# Patient Record
Sex: FEMALE | Race: WHITE | NOT HISPANIC OR LATINO | Employment: FULL TIME | URBAN - METROPOLITAN AREA
[De-identification: names, ages, dates, MRNs, and addresses within clinical notes are randomized per-mention and may not be internally consistent; named-entity substitution may affect disease eponyms.]

---

## 2023-02-22 PROBLEM — M19.90 ARTHRITIS, DEGENERATIVE: Status: ACTIVE | Noted: 2023-02-22

## 2023-02-22 PROBLEM — R22.9 SUBCUTANEOUS MASS: Status: ACTIVE | Noted: 2023-02-22

## 2023-02-22 PROBLEM — K62.5 RECTAL BLEED: Status: ACTIVE | Noted: 2023-02-22

## 2023-02-22 PROBLEM — I25.10 CAD S/P PERCUTANEOUS CORONARY ANGIOPLASTY: Status: ACTIVE | Noted: 2023-02-22

## 2023-02-22 PROBLEM — J06.9 ACUTE URI: Status: ACTIVE | Noted: 2023-02-22

## 2023-02-22 PROBLEM — M25.552 HIP PAIN, LEFT: Status: ACTIVE | Noted: 2023-02-22

## 2023-02-22 PROBLEM — H01.009 BLEPHARITIS: Status: ACTIVE | Noted: 2023-02-22

## 2023-02-22 PROBLEM — M19.171 POST-TRAUMATIC OSTEOARTHRITIS OF BOTH ANKLES: Status: ACTIVE | Noted: 2023-02-22

## 2023-02-22 PROBLEM — R55 NEAR SYNCOPE: Status: ACTIVE | Noted: 2023-02-22

## 2023-02-22 PROBLEM — Z98.61 CAD S/P PERCUTANEOUS CORONARY ANGIOPLASTY: Status: ACTIVE | Noted: 2023-02-22

## 2023-02-22 PROBLEM — J18.9 PNEUMONIA: Status: ACTIVE | Noted: 2023-02-22

## 2023-02-22 PROBLEM — N39.41 URGE INCONTINENCE OF URINE: Status: ACTIVE | Noted: 2023-02-22

## 2023-02-22 PROBLEM — M79.609 PAIN DUE TO ONYCHOMYCOSIS OF NAIL: Status: ACTIVE | Noted: 2023-02-22

## 2023-02-22 PROBLEM — R07.89 ATYPICAL CHEST PAIN: Status: ACTIVE | Noted: 2023-02-22

## 2023-02-22 PROBLEM — H66.90 ACUTE OTITIS MEDIA: Status: ACTIVE | Noted: 2023-02-22

## 2023-02-22 PROBLEM — R09.89 BRUIT OF LEFT CAROTID ARTERY: Status: ACTIVE | Noted: 2023-02-22

## 2023-02-22 PROBLEM — K62.5 BRIGHT RED RECTAL BLEEDING: Status: ACTIVE | Noted: 2023-02-22

## 2023-02-22 PROBLEM — I21.3 STEMI (ST ELEVATION MYOCARDIAL INFARCTION) (MULTI): Status: ACTIVE | Noted: 2023-02-22

## 2023-02-22 PROBLEM — R35.1 NOCTURIA: Status: ACTIVE | Noted: 2023-02-22

## 2023-02-22 PROBLEM — I65.21 STENOSIS OF RIGHT CAROTID ARTERY: Status: ACTIVE | Noted: 2023-02-22

## 2023-02-22 PROBLEM — H60.509 ACUTE OTITIS EXTERNA: Status: ACTIVE | Noted: 2023-02-22

## 2023-02-22 PROBLEM — D07.1 VULVAR INTRAEPITHELIAL NEOPLASIA (VIN) GRADE 3: Status: ACTIVE | Noted: 2023-02-22

## 2023-02-22 PROBLEM — R55 SYNCOPE: Status: ACTIVE | Noted: 2023-02-22

## 2023-02-22 PROBLEM — K13.0 CELLULITIS, LIP: Status: ACTIVE | Noted: 2023-02-22

## 2023-02-22 PROBLEM — N39.44 PRIMARY NOCTURNAL ENURESIS: Status: ACTIVE | Noted: 2023-02-22

## 2023-02-22 PROBLEM — R94.31 ABNORMAL EKG: Status: ACTIVE | Noted: 2023-02-22

## 2023-02-22 PROBLEM — R09.89 BILATERAL CAROTID BRUITS: Status: ACTIVE | Noted: 2023-02-22

## 2023-02-22 PROBLEM — C34.90 ADENOCARCINOMA OF LUNG (MULTI): Status: ACTIVE | Noted: 2023-02-22

## 2023-02-22 PROBLEM — R39.15 URINARY URGENCY: Status: ACTIVE | Noted: 2023-02-22

## 2023-02-22 PROBLEM — R51.9 HEADACHE: Status: ACTIVE | Noted: 2023-02-22

## 2023-02-22 PROBLEM — S69.91XA HAND INJURY, RIGHT, INITIAL ENCOUNTER: Status: ACTIVE | Noted: 2023-02-22

## 2023-02-22 PROBLEM — H00.019 STYE: Status: ACTIVE | Noted: 2023-02-22

## 2023-02-22 PROBLEM — N39.0 UTI (URINARY TRACT INFECTION): Status: ACTIVE | Noted: 2023-02-22

## 2023-02-22 PROBLEM — R42 DIZZINESS: Status: ACTIVE | Noted: 2023-02-22

## 2023-02-22 PROBLEM — L50.9 HIVES: Status: ACTIVE | Noted: 2023-02-22

## 2023-02-22 PROBLEM — M19.172 POST-TRAUMATIC OSTEOARTHRITIS OF BOTH ANKLES: Status: ACTIVE | Noted: 2023-02-22

## 2023-02-22 PROBLEM — J20.9 ACUTE BRONCHITIS: Status: ACTIVE | Noted: 2023-02-22

## 2023-02-22 PROBLEM — H60.11 CELLULITIS OF RIGHT EAR: Status: ACTIVE | Noted: 2023-02-22

## 2023-02-22 PROBLEM — D23.9 MULTIPLE DYSPLASTIC NEVI: Status: ACTIVE | Noted: 2023-02-22

## 2023-02-22 PROBLEM — G56.30 RADIAL NERVE COMPRESSION: Status: ACTIVE | Noted: 2023-02-22

## 2023-02-22 PROBLEM — L98.9 SKIN LESION OF FACE: Status: ACTIVE | Noted: 2023-02-22

## 2023-02-22 PROBLEM — K64.4 EXTERNAL HEMORRHOIDS: Status: ACTIVE | Noted: 2023-02-22

## 2023-02-22 PROBLEM — R00.2 PALPITATIONS: Status: ACTIVE | Noted: 2023-02-22

## 2023-02-22 PROBLEM — K52.9 COLITIS: Status: ACTIVE | Noted: 2023-02-22

## 2023-02-22 PROBLEM — M25.551 HIP PAIN, RIGHT: Status: ACTIVE | Noted: 2023-02-22

## 2023-02-22 PROBLEM — R32 URINARY INCONTINENCE: Status: ACTIVE | Noted: 2023-02-22

## 2023-02-22 PROBLEM — K52.1 ANTIBIOTIC-ASSOCIATED DIARRHEA: Status: ACTIVE | Noted: 2023-02-22

## 2023-02-22 PROBLEM — N32.81 OVERACTIVE BLADDER: Status: ACTIVE | Noted: 2023-02-22

## 2023-02-22 PROBLEM — N95.1 MENOPAUSAL STATE: Status: ACTIVE | Noted: 2023-02-22

## 2023-02-22 PROBLEM — I10 ESSENTIAL HYPERTENSION: Status: ACTIVE | Noted: 2023-02-22

## 2023-02-22 PROBLEM — R07.9 CHEST PAIN: Status: ACTIVE | Noted: 2023-02-22

## 2023-02-22 PROBLEM — G89.29 CHRONIC HIP PAIN: Status: ACTIVE | Noted: 2023-02-22

## 2023-02-22 PROBLEM — L03.112 CELLULITIS OF LEFT AXILLA: Status: ACTIVE | Noted: 2023-02-22

## 2023-02-22 PROBLEM — M54.16 LEFT LUMBAR RADICULITIS: Status: ACTIVE | Noted: 2023-02-22

## 2023-02-22 PROBLEM — N20.0 NEPHROLITHIASIS: Status: ACTIVE | Noted: 2023-02-22

## 2023-02-22 PROBLEM — C44.92 SQUAMOUS CELL SKIN CANCER: Status: ACTIVE | Noted: 2023-02-22

## 2023-02-22 PROBLEM — T36.95XA ANTIBIOTIC-ASSOCIATED DIARRHEA: Status: ACTIVE | Noted: 2023-02-22

## 2023-02-22 PROBLEM — F17.200 NICOTINE USE DISORDER: Status: ACTIVE | Noted: 2023-02-22

## 2023-02-22 PROBLEM — K21.9 GERD (GASTROESOPHAGEAL REFLUX DISEASE): Status: ACTIVE | Noted: 2023-02-22

## 2023-02-22 PROBLEM — L03.211 CELLULITIS OF FACE: Status: ACTIVE | Noted: 2023-02-22

## 2023-02-22 PROBLEM — L98.9 SKIN LESION OF LOWER EXTREMITY: Status: ACTIVE | Noted: 2023-02-22

## 2023-02-22 PROBLEM — R51.9 CEPHALGIA: Status: ACTIVE | Noted: 2023-02-22

## 2023-02-22 PROBLEM — B35.1 PAIN DUE TO ONYCHOMYCOSIS OF NAIL: Status: ACTIVE | Noted: 2023-02-22

## 2023-02-22 PROBLEM — F32.0 DEPRESSION, MAJOR, SINGLE EPISODE, MILD (CMS-HCC): Status: ACTIVE | Noted: 2023-02-22

## 2023-02-22 PROBLEM — J01.90 ACUTE SINUSITIS: Status: ACTIVE | Noted: 2023-02-22

## 2023-02-22 PROBLEM — S63.91XA SPRAIN OF RIGHT HAND: Status: ACTIVE | Noted: 2023-02-22

## 2023-02-22 PROBLEM — J02.9 PHARYNGITIS, ACUTE: Status: ACTIVE | Noted: 2023-02-22

## 2023-02-22 PROBLEM — I10 HYPERTENSION: Status: ACTIVE | Noted: 2023-02-22

## 2023-02-22 PROBLEM — M25.559 CHRONIC HIP PAIN: Status: ACTIVE | Noted: 2023-02-22

## 2023-02-22 PROBLEM — F41.9 ANXIETY: Status: ACTIVE | Noted: 2023-02-22

## 2023-02-22 PROBLEM — H60.91 RIGHT OTITIS EXTERNA: Status: ACTIVE | Noted: 2023-02-22

## 2023-02-22 PROBLEM — L98.9 BACK SKIN LESION: Status: ACTIVE | Noted: 2023-02-22

## 2023-02-22 PROBLEM — L72.9 BENIGN SKIN CYST: Status: ACTIVE | Noted: 2023-02-22

## 2023-02-22 PROBLEM — L03.111 CELLULITIS OF RIGHT AXILLA: Status: ACTIVE | Noted: 2023-02-22

## 2023-02-22 PROBLEM — I65.29 CAROTID ARTERY STENOSIS: Status: ACTIVE | Noted: 2023-02-22

## 2023-02-22 PROBLEM — M19.90 OSTEOARTHRITIS: Status: ACTIVE | Noted: 2023-02-22

## 2023-02-22 PROBLEM — R10.9 ABDOMINAL PAIN: Status: ACTIVE | Noted: 2023-02-22

## 2023-02-22 PROBLEM — H61.23 EXCESSIVE EAR WAX, BILATERAL: Status: ACTIVE | Noted: 2023-02-22

## 2023-02-22 PROBLEM — E78.5 HYPERLIPIDEMIA: Status: ACTIVE | Noted: 2023-02-22

## 2023-02-22 RX ORDER — CITALOPRAM 40 MG/1
1 TABLET, FILM COATED ORAL DAILY
COMMUNITY
Start: 2020-08-25 | End: 2023-03-21 | Stop reason: SDUPTHER

## 2023-02-22 RX ORDER — ATORVASTATIN CALCIUM 80 MG/1
1 TABLET, FILM COATED ORAL DAILY
COMMUNITY
Start: 2016-04-29 | End: 2023-12-18 | Stop reason: SDUPTHER

## 2023-02-22 RX ORDER — ALPRAZOLAM 0.5 MG/1
1 TABLET ORAL 3 TIMES DAILY PRN
COMMUNITY
End: 2023-03-21 | Stop reason: SDUPTHER

## 2023-02-22 RX ORDER — CALCIUM CARBONATE 300MG(750)
1 TABLET,CHEWABLE ORAL DAILY
COMMUNITY
End: 2023-03-21 | Stop reason: SDUPTHER

## 2023-02-22 RX ORDER — OMEPRAZOLE 40 MG/1
1 CAPSULE, DELAYED RELEASE ORAL DAILY
COMMUNITY
Start: 2022-04-04 | End: 2023-03-21 | Stop reason: SDUPTHER

## 2023-02-22 RX ORDER — IBUPROFEN 200 MG
1 TABLET ORAL DAILY
COMMUNITY
End: 2023-07-06 | Stop reason: SDUPTHER

## 2023-02-22 RX ORDER — VENLAFAXINE HYDROCHLORIDE 37.5 MG/1
1 CAPSULE, EXTENDED RELEASE ORAL
COMMUNITY
Start: 2022-06-30 | End: 2023-03-21 | Stop reason: SDUPTHER

## 2023-02-22 RX ORDER — CARVEDILOL 25 MG/1
1 TABLET ORAL
COMMUNITY
Start: 2021-12-05 | End: 2023-03-21 | Stop reason: SDUPTHER

## 2023-02-22 RX ORDER — SOLIFENACIN SUCCINATE 10 MG/1
1 TABLET, FILM COATED ORAL DAILY
COMMUNITY
Start: 2022-08-26 | End: 2023-03-21 | Stop reason: SDUPTHER

## 2023-02-22 RX ORDER — NITROGLYCERIN 0.4 MG/1
1 TABLET SUBLINGUAL EVERY 5 MIN PRN
COMMUNITY
End: 2023-07-06 | Stop reason: SDUPTHER

## 2023-02-22 RX ORDER — CELECOXIB 200 MG/1
1 CAPSULE ORAL DAILY PRN
COMMUNITY
Start: 2022-03-25 | End: 2023-03-07 | Stop reason: SDUPTHER

## 2023-02-22 RX ORDER — LISINOPRIL 20 MG/1
1 TABLET ORAL DAILY
COMMUNITY
End: 2023-03-21 | Stop reason: SDUPTHER

## 2023-02-22 RX ORDER — ASPIRIN 81 MG/1
1 TABLET ORAL DAILY
COMMUNITY

## 2023-03-06 ENCOUNTER — TELEPHONE (OUTPATIENT)
Dept: PRIMARY CARE | Facility: CLINIC | Age: 73
End: 2023-03-06
Payer: MEDICARE

## 2023-03-07 DIAGNOSIS — M19.91 PRIMARY OSTEOARTHRITIS, UNSPECIFIED SITE: Primary | ICD-10-CM

## 2023-03-07 RX ORDER — CELECOXIB 200 MG/1
200 CAPSULE ORAL DAILY PRN
Qty: 90 CAPSULE | Refills: 0 | Status: SHIPPED | OUTPATIENT
Start: 2023-03-07 | End: 2023-03-21 | Stop reason: SDUPTHER

## 2023-03-07 NOTE — TELEPHONE ENCOUNTER
Rx Refill Request Telephone Encounter    Celobrex 200mg take one tab prn  Pharm: [facility] # [phone]  LR:01/05/23  LV:02/21/23  NV:03/13/23

## 2023-03-09 ENCOUNTER — TELEPHONE (OUTPATIENT)
Dept: PRIMARY CARE | Facility: CLINIC | Age: 73
End: 2023-03-09
Payer: MEDICARE

## 2023-03-09 NOTE — TELEPHONE ENCOUNTER
----- Message from Abram Salmeron DO sent at 3/8/2023  8:45 PM EST -----  Please let her know her CT of the chest showed no changes compared to her previous study in October 2022.  There was a mildly enlarged lymph node in the center of the lungs measuring 1.4 cm.  There was some scarring in the upper lungs.  Nothing new since October and no concerning findings.

## 2023-03-21 ENCOUNTER — OFFICE VISIT (OUTPATIENT)
Dept: PRIMARY CARE | Facility: CLINIC | Age: 73
End: 2023-03-21
Payer: MEDICARE

## 2023-03-21 VITALS
SYSTOLIC BLOOD PRESSURE: 110 MMHG | DIASTOLIC BLOOD PRESSURE: 62 MMHG | WEIGHT: 139 LBS | TEMPERATURE: 95.6 F | BODY MASS INDEX: 23.73 KG/M2 | HEIGHT: 64 IN

## 2023-03-21 DIAGNOSIS — K21.9 GASTROESOPHAGEAL REFLUX DISEASE, UNSPECIFIED WHETHER ESOPHAGITIS PRESENT: ICD-10-CM

## 2023-03-21 DIAGNOSIS — M19.91 PRIMARY OSTEOARTHRITIS, UNSPECIFIED SITE: ICD-10-CM

## 2023-03-21 DIAGNOSIS — C34.90 ADENOCARCINOMA OF LUNG, UNSPECIFIED LATERALITY (MULTI): Primary | ICD-10-CM

## 2023-03-21 DIAGNOSIS — R32 URINARY INCONTINENCE, UNSPECIFIED TYPE: ICD-10-CM

## 2023-03-21 DIAGNOSIS — I21.3 ST ELEVATION MYOCARDIAL INFARCTION (STEMI), UNSPECIFIED ARTERY (MULTI): ICD-10-CM

## 2023-03-21 DIAGNOSIS — F17.200 NICOTINE USE DISORDER: ICD-10-CM

## 2023-03-21 DIAGNOSIS — M70.62 TROCHANTERIC BURSITIS OF LEFT HIP: ICD-10-CM

## 2023-03-21 DIAGNOSIS — D07.1 VULVAR INTRAEPITHELIAL NEOPLASIA (VIN) GRADE 3: ICD-10-CM

## 2023-03-21 DIAGNOSIS — Z98.61 CAD S/P PERCUTANEOUS CORONARY ANGIOPLASTY: ICD-10-CM

## 2023-03-21 DIAGNOSIS — I25.10 CAD S/P PERCUTANEOUS CORONARY ANGIOPLASTY: ICD-10-CM

## 2023-03-21 DIAGNOSIS — F32.0 DEPRESSION, MAJOR, SINGLE EPISODE, MILD (CMS-HCC): ICD-10-CM

## 2023-03-21 PROCEDURE — 1160F RVW MEDS BY RX/DR IN RCRD: CPT | Performed by: FAMILY MEDICINE

## 2023-03-21 PROCEDURE — 3078F DIAST BP <80 MM HG: CPT | Performed by: FAMILY MEDICINE

## 2023-03-21 PROCEDURE — 1159F MED LIST DOCD IN RCRD: CPT | Performed by: FAMILY MEDICINE

## 2023-03-21 PROCEDURE — 1036F TOBACCO NON-USER: CPT | Performed by: FAMILY MEDICINE

## 2023-03-21 PROCEDURE — 99214 OFFICE O/P EST MOD 30 MIN: CPT | Performed by: FAMILY MEDICINE

## 2023-03-21 PROCEDURE — 3074F SYST BP LT 130 MM HG: CPT | Performed by: FAMILY MEDICINE

## 2023-03-21 RX ORDER — OMEPRAZOLE 40 MG/1
40 CAPSULE, DELAYED RELEASE ORAL
Qty: 90 CAPSULE | Refills: 1 | Status: SHIPPED | OUTPATIENT
Start: 2023-03-21 | End: 2024-02-01 | Stop reason: WASHOUT

## 2023-03-21 RX ORDER — ALBUTEROL SULFATE 90 UG/1
AEROSOL, METERED RESPIRATORY (INHALATION)
COMMUNITY
Start: 2023-03-15 | End: 2024-02-01 | Stop reason: SDUPTHER

## 2023-03-21 RX ORDER — CITALOPRAM 40 MG/1
40 TABLET, FILM COATED ORAL DAILY
Qty: 90 TABLET | Refills: 1 | Status: SHIPPED | OUTPATIENT
Start: 2023-03-21 | End: 2023-07-06 | Stop reason: SDUPTHER

## 2023-03-21 RX ORDER — PREDNISONE 20 MG/1
TABLET ORAL
Qty: 21 TABLET | Refills: 0 | Status: SHIPPED | OUTPATIENT
Start: 2023-03-21 | End: 2023-05-22 | Stop reason: ALTCHOICE

## 2023-03-21 RX ORDER — LISINOPRIL 20 MG/1
20 TABLET ORAL DAILY
Qty: 30 TABLET | Refills: 1 | Status: SHIPPED | OUTPATIENT
Start: 2023-03-21

## 2023-03-21 RX ORDER — SOLIFENACIN SUCCINATE 10 MG/1
10 TABLET, FILM COATED ORAL DAILY
Qty: 90 TABLET | Refills: 1 | Status: SHIPPED | OUTPATIENT
Start: 2023-03-21 | End: 2024-02-01 | Stop reason: WASHOUT

## 2023-03-21 RX ORDER — CALCIUM CARBONATE 300MG(750)
1 TABLET,CHEWABLE ORAL DAILY
Qty: 90 TABLET | Refills: 1 | Status: SHIPPED | OUTPATIENT
Start: 2023-03-21 | End: 2024-01-04 | Stop reason: SDUPTHER

## 2023-03-21 RX ORDER — CARVEDILOL 25 MG/1
25 TABLET ORAL
Qty: 180 TABLET | Refills: 1 | Status: SHIPPED | OUTPATIENT
Start: 2023-03-21 | End: 2023-07-06 | Stop reason: SDUPTHER

## 2023-03-21 RX ORDER — VENLAFAXINE HYDROCHLORIDE 37.5 MG/1
37.5 CAPSULE, EXTENDED RELEASE ORAL
Qty: 90 CAPSULE | Refills: 1 | Status: SHIPPED | OUTPATIENT
Start: 2023-03-21 | End: 2023-07-06 | Stop reason: SDUPTHER

## 2023-03-21 RX ORDER — CHOLECALCIFEROL (VITAMIN D3) 50 MCG
TABLET ORAL
COMMUNITY

## 2023-03-21 RX ORDER — CELECOXIB 200 MG/1
200 CAPSULE ORAL DAILY PRN
Qty: 90 CAPSULE | Refills: 1 | Status: SHIPPED | OUTPATIENT
Start: 2023-03-21 | End: 2023-07-06 | Stop reason: SDUPTHER

## 2023-03-21 RX ORDER — ESTRADIOL 0.1 MG/G
CREAM VAGINAL
COMMUNITY
Start: 2023-02-22 | End: 2024-01-25 | Stop reason: ALTCHOICE

## 2023-03-21 RX ORDER — ALPRAZOLAM 0.5 MG/1
0.5 TABLET ORAL 3 TIMES DAILY PRN
Qty: 90 TABLET | Refills: 2 | Status: SHIPPED | OUTPATIENT
Start: 2023-03-21 | End: 2023-07-06 | Stop reason: SDUPTHER

## 2023-03-21 RX ORDER — UMECLIDINIUM BROMIDE AND VILANTEROL TRIFENATATE 62.5; 25 UG/1; UG/1
POWDER RESPIRATORY (INHALATION)
COMMUNITY
Start: 2023-03-15 | End: 2024-02-01 | Stop reason: SDUPTHER

## 2023-03-21 ASSESSMENT — PATIENT HEALTH QUESTIONNAIRE - PHQ9
10. IF YOU CHECKED OFF ANY PROBLEMS, HOW DIFFICULT HAVE THESE PROBLEMS MADE IT FOR YOU TO DO YOUR WORK, TAKE CARE OF THINGS AT HOME, OR GET ALONG WITH OTHER PEOPLE: SOMEWHAT DIFFICULT
SUM OF ALL RESPONSES TO PHQ9 QUESTIONS 1 AND 2: 2
2. FEELING DOWN, DEPRESSED OR HOPELESS: SEVERAL DAYS
1. LITTLE INTEREST OR PLEASURE IN DOING THINGS: SEVERAL DAYS

## 2023-03-21 ASSESSMENT — ENCOUNTER SYMPTOMS: DEPRESSION: 0

## 2023-03-21 NOTE — PROGRESS NOTES
"Subjective   Patient ID: [id number]     Edu Domingo is a 72 y.o. female who presents for Follow-up and Med Refill.  HPI  She is here for a recheck.      She saw gyn onc and the report from her 2/21/23 visit was reviewed today.  Hx DAMIEN.  Exam was normal that day following hematuria thought to be from a UTI that resolved with antibiotics.  No further blood in the urine.    She is following cardiology and EPS for v tach.      At my last visit, I ordered  a CT of the chest due to her chronic cough and hx of lung cancer.  It showed only a stable precarinal lymph node and no sign of recurrence of the lung cancer.      She saw Dr Barnes.  She was told she had 64% of her lung function.    She has recently been given nicotine replacement treatment for smoking cessation.  So far, she is still not smoking and still on the nicotine patch.  She has plenty for now.    She denies any chest pain or heart palpitations recently.  Objective     /62 (BP Location: Left arm, Patient Position: Sitting)   Temp 35.3 °C (95.6 °F) (Skin)   Ht 1.626 m (5' 4\")   Wt 63 kg (139 lb)   BMI 23.86 kg/m²      Physical Exam  Constitutional:       Appearance: Normal appearance.   Cardiovascular:      Rate and Rhythm: Normal rate and regular rhythm.      Pulses: Normal pulses.      Heart sounds: Normal heart sounds.   Pulmonary:      Effort: Pulmonary effort is normal.      Breath sounds: Normal breath sounds. No wheezing, rhonchi or rales.   Neurological:      General: No focal deficit present.      Mental Status: She is alert and oriented to person, place, and time.   Psychiatric:         Mood and Affect: Mood normal.      Comments: Anxiety and depression are well controlled on the medications         Assessment/Plan   Problem List Items Addressed This Visit          Respiratory    Adenocarcinoma of lung (CMS/HCC) - Primary       Circulatory    CAD S/P percutaneous coronary angioplasty    Relevant Medications    carvedilol (Coreg) 25 mg " tablet    lisinopril 20 mg tablet    magnesium oxide (Mag-Ox) 400 mg tablet    STEMI (ST elevation myocardial infarction) (CMS/HCC)    Relevant Medications    carvedilol (Coreg) 25 mg tablet       Digestive    GERD (gastroesophageal reflux disease)    Relevant Medications    omeprazole (PriLOSEC) 40 mg DR capsule       Genitourinary    Urinary incontinence    Relevant Medications    solifenacin (VESIcare) 10 mg tablet    Vulvar intraepithelial neoplasia (DAMIEN) grade 3       Musculoskeletal    Osteoarthritis    Relevant Medications    celecoxib (CeleBREX) 200 mg capsule       Other    Depression, major, single episode, mild (CMS/HCC)    Relevant Medications    ALPRAZolam (Xanax) 0.5 mg tablet    citalopram (CeleXA) 40 mg tablet    venlafaxine XR (Effexor-XR) 37.5 mg 24 hr capsule    Nicotine use disorder     Other Visit Diagnoses       Trochanteric bursitis of left hip        Relevant Medications    predniSONE (Deltasone) 20 mg tablet          I refilled your medication.  Follow up with cardiology and pulmonology.  Return in three months.  It is excellent that you are still off cigarettes.  Call if you need more of the nicotine patches.    Abram Salmeron, DO

## 2023-03-21 NOTE — PATIENT INSTRUCTIONS
I refilled your medication.  Follow up with cardiology and pulmonology.  Return in three months.  It is excellent that you are still off cigarettes.  Call if you need more of the nicotine patches.

## 2023-05-12 ENCOUNTER — TELEPHONE (OUTPATIENT)
Dept: PRIMARY CARE | Facility: CLINIC | Age: 73
End: 2023-05-12
Payer: MEDICARE

## 2023-05-12 DIAGNOSIS — N39.0 URINARY TRACT INFECTION WITHOUT HEMATURIA, SITE UNSPECIFIED: Primary | ICD-10-CM

## 2023-05-12 RX ORDER — NITROFURANTOIN 25; 75 MG/1; MG/1
100 CAPSULE ORAL 2 TIMES DAILY
Qty: 14 CAPSULE | Refills: 0 | Status: SHIPPED | OUTPATIENT
Start: 2023-05-12 | End: 2023-05-19

## 2023-05-12 NOTE — TELEPHONE ENCOUNTER
Pt LM on VM was treated in March for a UTI.  It went away and now its back and asking if she can have a antibiotic.  She has Burning and abd pain

## 2023-05-22 ENCOUNTER — OFFICE VISIT (OUTPATIENT)
Dept: PRIMARY CARE | Facility: CLINIC | Age: 73
End: 2023-05-22
Payer: MEDICARE

## 2023-05-22 VITALS
DIASTOLIC BLOOD PRESSURE: 70 MMHG | WEIGHT: 138 LBS | SYSTOLIC BLOOD PRESSURE: 162 MMHG | TEMPERATURE: 97.9 F | BODY MASS INDEX: 22.96 KG/M2

## 2023-05-22 DIAGNOSIS — M79.10 MYALGIA: ICD-10-CM

## 2023-05-22 DIAGNOSIS — I47.29 NON-SUSTAINED VENTRICULAR TACHYCARDIA (MULTI): Primary | ICD-10-CM

## 2023-05-22 DIAGNOSIS — M25.50 ARTHRALGIA, UNSPECIFIED JOINT: ICD-10-CM

## 2023-05-22 LAB
ALANINE AMINOTRANSFERASE (SGPT) (U/L) IN SER/PLAS: 137 U/L (ref 7–45)
ALBUMIN (G/DL) IN SER/PLAS: 3.4 G/DL (ref 3.4–5)
ALKALINE PHOSPHATASE (U/L) IN SER/PLAS: 488 U/L (ref 33–136)
ANION GAP IN SER/PLAS: 15 MMOL/L (ref 10–20)
ASPARTATE AMINOTRANSFERASE (SGOT) (U/L) IN SER/PLAS: 55 U/L (ref 9–39)
BASOPHILS (10*3/UL) IN BLOOD BY AUTOMATED COUNT: 0.09 X10E9/L (ref 0–0.1)
BASOPHILS/100 LEUKOCYTES IN BLOOD BY AUTOMATED COUNT: 0.2 % (ref 0–2)
BILIRUBIN TOTAL (MG/DL) IN SER/PLAS: 1.8 MG/DL (ref 0–1.2)
CALCIUM (MG/DL) IN SER/PLAS: 9.6 MG/DL (ref 8.6–10.3)
CARBON DIOXIDE, TOTAL (MMOL/L) IN SER/PLAS: 28 MMOL/L (ref 21–32)
CHLORIDE (MMOL/L) IN SER/PLAS: 94 MMOL/L (ref 98–107)
CREATINE KINASE (U/L) IN SER/PLAS: 16 U/L (ref 0–215)
CREATININE (MG/DL) IN SER/PLAS: 1.21 MG/DL (ref 0.5–1.05)
EOSINOPHILS (10*3/UL) IN BLOOD BY AUTOMATED COUNT: 0.01 X10E9/L (ref 0–0.7)
ERYTHROCYTE DISTRIBUTION WIDTH (RATIO) BY AUTOMATED COUNT: 15.6 % (ref 11.5–14.5)
ERYTHROCYTE MEAN CORPUSCULAR HEMOGLOBIN CONCENTRATION (G/DL) BY AUTOMATED: 32.9 G/DL (ref 32–36)
ERYTHROCYTE MEAN CORPUSCULAR VOLUME (FL) BY AUTOMATED COUNT: 82 FL (ref 80–100)
ERYTHROCYTES (10*6/UL) IN BLOOD BY AUTOMATED COUNT: 4.2 X10E12/L (ref 4–5.2)
GFR FEMALE: 48 ML/MIN/1.73M2
GLUCOSE (MG/DL) IN SER/PLAS: 100 MG/DL (ref 74–99)
HEMATOCRIT (%) IN BLOOD BY AUTOMATED COUNT: 34.6 % (ref 36–46)
HEMOGLOBIN (G/DL) IN BLOOD: 11.4 G/DL (ref 12–16)
IMMATURE GRANULOCYTES/100 LEUKOCYTES IN BLOOD BY AUTOMATED COUNT: 0.7 % (ref 0–0.9)
LEUKOCYTES (10*3/UL) IN BLOOD BY AUTOMATED COUNT: 36.4 X10E9/L (ref 4.4–11.3)
LYMPHOCYTES (10*3/UL) IN BLOOD BY AUTOMATED COUNT: 1.21 X10E9/L (ref 1.2–4.8)
LYMPHOCYTES/100 LEUKOCYTES IN BLOOD BY AUTOMATED COUNT: 3.3 % (ref 13–44)
MONOCYTES (10*3/UL) IN BLOOD BY AUTOMATED COUNT: 1.24 X10E9/L (ref 0.1–1)
MONOCYTES/100 LEUKOCYTES IN BLOOD BY AUTOMATED COUNT: 3.4 % (ref 2–10)
NEUTROPHILS (10*3/UL) IN BLOOD BY AUTOMATED COUNT: 33.6 X10E9/L (ref 1.2–7.7)
NEUTROPHILS/100 LEUKOCYTES IN BLOOD BY AUTOMATED COUNT: 92.4 % (ref 40–80)
PLATELETS (10*3/UL) IN BLOOD AUTOMATED COUNT: 198 X10E9/L (ref 150–450)
POTASSIUM (MMOL/L) IN SER/PLAS: 4.5 MMOL/L (ref 3.5–5.3)
PROTEIN TOTAL: 5.9 G/DL (ref 6.4–8.2)
SEDIMENTATION RATE, ERYTHROCYTE: 29 MM/H (ref 0–30)
SODIUM (MMOL/L) IN SER/PLAS: 132 MMOL/L (ref 136–145)
THYROTROPIN (MIU/L) IN SER/PLAS BY DETECTION LIMIT <= 0.05 MIU/L: 0.99 MIU/L (ref 0.44–3.98)
UREA NITROGEN (MG/DL) IN SER/PLAS: 30 MG/DL (ref 6–23)

## 2023-05-22 PROCEDURE — 3078F DIAST BP <80 MM HG: CPT | Performed by: FAMILY MEDICINE

## 2023-05-22 PROCEDURE — 3077F SYST BP >= 140 MM HG: CPT | Performed by: FAMILY MEDICINE

## 2023-05-22 PROCEDURE — 86431 RHEUMATOID FACTOR QUANT: CPT

## 2023-05-22 PROCEDURE — 82550 ASSAY OF CK (CPK): CPT

## 2023-05-22 PROCEDURE — 1159F MED LIST DOCD IN RCRD: CPT | Performed by: FAMILY MEDICINE

## 2023-05-22 PROCEDURE — 1036F TOBACCO NON-USER: CPT | Performed by: FAMILY MEDICINE

## 2023-05-22 PROCEDURE — 86038 ANTINUCLEAR ANTIBODIES: CPT

## 2023-05-22 PROCEDURE — 99214 OFFICE O/P EST MOD 30 MIN: CPT | Performed by: FAMILY MEDICINE

## 2023-05-22 PROCEDURE — 85652 RBC SED RATE AUTOMATED: CPT

## 2023-05-22 PROCEDURE — 1160F RVW MEDS BY RX/DR IN RCRD: CPT | Performed by: FAMILY MEDICINE

## 2023-05-22 PROCEDURE — 85025 COMPLETE CBC W/AUTO DIFF WBC: CPT

## 2023-05-22 PROCEDURE — 80053 COMPREHEN METABOLIC PANEL: CPT

## 2023-05-22 PROCEDURE — 84443 ASSAY THYROID STIM HORMONE: CPT

## 2023-05-22 RX ORDER — PREDNISONE 20 MG/1
TABLET ORAL
Qty: 21 TABLET | Refills: 0 | Status: SHIPPED | OUTPATIENT
Start: 2023-05-22 | End: 2023-06-06 | Stop reason: ALTCHOICE

## 2023-05-22 NOTE — PROGRESS NOTES
"Subjective   Patient ID: [id number]     Edu Domingo is a 72 y.o. female who presents for every part of body aches .  HPI  She complains of generalized body aches.  This started Tuesday or Wednesday last week.  She has called off work.  It is in the muscles and the joint.  Widespread pain all over.      Denies chest pain or SOB.  Has \"a little bit of pain in the lower abdomen\".  She states she often gets this way when she gets an infection.  She was having dysuria.  I called in an antibiotic Friday and that is going away, she states.  Denies fever.      She has a history of CAD and STEMI.  Has had nonsustained V tach.  Follows a cardiologist, Dr Randall, for this.      She has not eaten much since Wednesday. She has nausea but has not vomited. She has been drinking fluids.  Objective     /70 (BP Location: Left arm, Patient Position: Sitting)   Temp 36.6 °C (97.9 °F)   Wt 62.6 kg (138 lb)   BMI 22.96 kg/m²      Physical Exam  Constitutional:       Appearance: Normal appearance.   Cardiovascular:      Rate and Rhythm: Normal rate and regular rhythm.      Heart sounds: Normal heart sounds.   Pulmonary:      Effort: Pulmonary effort is normal.      Breath sounds: Normal breath sounds.   Neurological:      General: No focal deficit present.      Mental Status: She is alert and oriented to person, place, and time.      Motor: No weakness.         Assessment/Plan   Problem List Items Addressed This Visit          Circulatory    Non-sustained ventricular tachycardia (CMS/HCC) - Primary     Other Visit Diagnoses       Arthralgia, unspecified joint        Relevant Medications    predniSONE (Deltasone) 20 mg tablet    Other Relevant Orders    Rheumatoid Factor    REBECCA with Reflex to JON    Sedimentation Rate    CBC and Auto Differential    Comprehensive Metabolic Panel    Thyroid Stimulating Hormone    CK    Myalgia        Relevant Medications    predniSONE (Deltasone) 20 mg tablet    Other Relevant Orders    Rheumatoid " Factor    REBECCA with Reflex to JON    Sedimentation Rate    CBC and Auto Differential    Comprehensive Metabolic Panel    Thyroid Stimulating Hormone    CK        Continue with your antibiotics.  I will order labs.  I will order steroids to try to help with your muscle and joint aches.  Return in one week if this is still going on.  Go to the ER if this gets worse.    Abram Salmeron, DO

## 2023-05-22 NOTE — PATIENT INSTRUCTIONS
Continue with your antibiotics.  I will order labs.  I will order steroids to try to help with your muscle and joint aches.  Return in one week if this is still going on.  Go to the ER if this gets worse.

## 2023-05-23 ENCOUNTER — TELEPHONE (OUTPATIENT)
Dept: PRIMARY CARE | Facility: CLINIC | Age: 73
End: 2023-05-23
Payer: MEDICARE

## 2023-05-23 LAB
ANTI-NUCLEAR ANTIBODY (ANA): NEGATIVE
RHEUMATOID FACTOR (IU/ML) IN SERUM OR PLASMA: 11 IU/ML (ref 0–15)

## 2023-05-23 NOTE — TELEPHONE ENCOUNTER
Called and spoke with pt.  Labs discussed. Advised to go to the ER.  Called report to attending at [facility].  Pt agrees to go to [facility] ER.

## 2023-05-30 ENCOUNTER — PATIENT OUTREACH (OUTPATIENT)
Dept: CARE COORDINATION | Facility: CLINIC | Age: 73
End: 2023-05-30

## 2023-05-30 ENCOUNTER — OFFICE VISIT (OUTPATIENT)
Dept: PRIMARY CARE | Facility: CLINIC | Age: 73
End: 2023-05-30
Payer: MEDICARE

## 2023-05-30 ENCOUNTER — DOCUMENTATION (OUTPATIENT)
Dept: CARE COORDINATION | Facility: CLINIC | Age: 73
End: 2023-05-30

## 2023-05-30 VITALS
TEMPERATURE: 97.7 F | OXYGEN SATURATION: 100 % | BODY MASS INDEX: 23.99 KG/M2 | RESPIRATION RATE: 16 BRPM | WEIGHT: 144 LBS | HEART RATE: 65 BPM | SYSTOLIC BLOOD PRESSURE: 130 MMHG | DIASTOLIC BLOOD PRESSURE: 60 MMHG | HEIGHT: 65 IN

## 2023-05-30 DIAGNOSIS — A41.9 SEPSIS WITHOUT ACUTE ORGAN DYSFUNCTION, DUE TO UNSPECIFIED ORGANISM (MULTI): ICD-10-CM

## 2023-05-30 DIAGNOSIS — N12 PYELONEPHRITIS: Primary | ICD-10-CM

## 2023-05-30 DIAGNOSIS — N12 PYELONEPHRITIS: ICD-10-CM

## 2023-05-30 PROCEDURE — 99214 OFFICE O/P EST MOD 30 MIN: CPT | Performed by: FAMILY MEDICINE

## 2023-05-30 PROCEDURE — 1036F TOBACCO NON-USER: CPT | Performed by: FAMILY MEDICINE

## 2023-05-30 PROCEDURE — 3075F SYST BP GE 130 - 139MM HG: CPT | Performed by: FAMILY MEDICINE

## 2023-05-30 PROCEDURE — 1159F MED LIST DOCD IN RCRD: CPT | Performed by: FAMILY MEDICINE

## 2023-05-30 PROCEDURE — 3078F DIAST BP <80 MM HG: CPT | Performed by: FAMILY MEDICINE

## 2023-05-30 PROCEDURE — 1160F RVW MEDS BY RX/DR IN RCRD: CPT | Performed by: FAMILY MEDICINE

## 2023-05-30 ASSESSMENT — PATIENT HEALTH QUESTIONNAIRE - PHQ9
1. LITTLE INTEREST OR PLEASURE IN DOING THINGS: NOT AT ALL
SUM OF ALL RESPONSES TO PHQ9 QUESTIONS 1 AND 2: 0
2. FEELING DOWN, DEPRESSED OR HOPELESS: NOT AT ALL

## 2023-05-30 ASSESSMENT — PAIN SCALES - GENERAL: PAINLEVEL: 0-NO PAIN

## 2023-05-30 NOTE — PROGRESS NOTES
"Subjective   Patient ID: [id number]     Edu Domingo is a 72 y.o. female who presents for Hospital Follow-up.  HPI  She is here for a recheck after being in the hospital.  She was seen here for widespread body aches.  Labs were done and her white count was greater than 30 with a left shift.  She was called and instructed to go to the ER.  She was hospitalized at [facility].  DC summary reviewed.  She had Proteus cultured from urine and one blood culture.  Discharged on one week of Augmentin.    She states the pain is better.  She still has weakness in the legs.      She states she never had fever, dysuria, frequency of urination.      She states that the pain started to go away prior to going to the hospital.  She states the prednisone helps with the pain.      Objective     /60 (BP Location: Right arm, Patient Position: Sitting, BP Cuff Size: Adult)   Pulse 65   Temp 36.5 °C (97.7 °F) (Temporal)   Resp 16   Ht 1.651 m (5' 5\")   Wt 65.3 kg (144 lb)   SpO2 100%   BMI 23.96 kg/m²      Physical Exam  Constitutional:       General: She is not in acute distress.     Appearance: Normal appearance. She is not toxic-appearing.      Comments: Patient appears much more comfortable today.     Cardiovascular:      Rate and Rhythm: Normal rate and regular rhythm.      Pulses: Normal pulses.      Heart sounds: Normal heart sounds.   Pulmonary:      Effort: Pulmonary effort is normal. No respiratory distress.      Breath sounds: Normal breath sounds.   Abdominal:      General: Abdomen is flat.      Palpations: Abdomen is soft.      Tenderness: There is no abdominal tenderness.   Neurological:      General: No focal deficit present.      Mental Status: She is alert and oriented to person, place, and time.         Assessment/Plan   Problem List Items Addressed This Visit    None  Visit Diagnoses       Pyelonephritis    -  Primary    Relevant Orders    Urine Culture    Sepsis without acute organ dysfunction, due to " unspecified organism (CMS/HCC)            You seem clinically improved from your UTI with spread of the bacteria to the blood stream.  Continue the augmentin until it is done.  I will recheck a urine culture today.  You have an order for a recheck CBC.      You will follow up with your urologist, Dr Linton.      Return in one month.  Return right away if symptoms of last week return or go to the ER.      Abram Salmeron, DO

## 2023-05-30 NOTE — PATIENT INSTRUCTIONS
You seem clinically improved from your UTI with spread of the bacteria to the blood stream.  Continue the augmentin until it is done.  I will recheck a urine culture today.  You have an order for a recheck CBC.      You will follow up with your urologist, Dr Linton.      Return in one month.  Return right away if symptoms of last week return or go to the ER.

## 2023-05-30 NOTE — PROGRESS NOTES
Discharge Facility:[facility]  Discharge Diagnosis:N12 Pyelonephritis  Admission Date:5/23/23  Discharge Date: 5/26/23    PCP Appointment Date:5/30/23  Specialist Appointment Date:   Hospital Encounter and Summary: Linked   See discharge assessment below for further details

## 2023-06-05 ENCOUNTER — TELEPHONE (OUTPATIENT)
Dept: PRIMARY CARE | Facility: CLINIC | Age: 73
End: 2023-06-05
Payer: MEDICARE

## 2023-06-05 LAB
ERYTHROCYTE DISTRIBUTION WIDTH (RATIO) BY AUTOMATED COUNT: 18.2 % (ref 11.5–14.5)
ERYTHROCYTE MEAN CORPUSCULAR HEMOGLOBIN CONCENTRATION (G/DL) BY AUTOMATED: 30.5 G/DL (ref 32–36)
ERYTHROCYTE MEAN CORPUSCULAR VOLUME (FL) BY AUTOMATED COUNT: 88 FL (ref 80–100)
ERYTHROCYTES (10*6/UL) IN BLOOD BY AUTOMATED COUNT: 4.13 X10E12/L (ref 4–5.2)
HEMATOCRIT (%) IN BLOOD BY AUTOMATED COUNT: 36.4 % (ref 36–46)
HEMOGLOBIN (G/DL) IN BLOOD: 11.1 G/DL (ref 12–16)
LEUKOCYTES (10*3/UL) IN BLOOD BY AUTOMATED COUNT: 7.7 X10E9/L (ref 4.4–11.3)
NRBC (PER 100 WBCS) BY AUTOMATED COUNT: 0 /100 WBC (ref 0–0)
PLATELETS (10*3/UL) IN BLOOD AUTOMATED COUNT: 477 X10E9/L (ref 150–450)

## 2023-06-05 NOTE — TELEPHONE ENCOUNTER
Patient complains of severe bilateral leg and feet swelling since being discharged from the hospital.  She states that she has been elevated feet/legs and this is not helping.  She is asking if you have any recommendations?  Please call [phone]

## 2023-06-05 NOTE — TELEPHONE ENCOUNTER
----- Message from Abram Salmeron DO sent at 6/5/2023  4:09 PM EDT -----  Please let her know her white blood cell count has improved down to normal.  This indicates that her infection has significantly improved.

## 2023-06-06 ENCOUNTER — OFFICE VISIT (OUTPATIENT)
Dept: PRIMARY CARE | Facility: CLINIC | Age: 73
End: 2023-06-06
Payer: MEDICARE

## 2023-06-06 VITALS
BODY MASS INDEX: 22.63 KG/M2 | DIASTOLIC BLOOD PRESSURE: 80 MMHG | TEMPERATURE: 97.7 F | SYSTOLIC BLOOD PRESSURE: 122 MMHG | WEIGHT: 136 LBS

## 2023-06-06 DIAGNOSIS — Z98.61 CAD S/P PERCUTANEOUS CORONARY ANGIOPLASTY: ICD-10-CM

## 2023-06-06 DIAGNOSIS — C34.90 ADENOCARCINOMA OF LUNG, UNSPECIFIED LATERALITY (MULTI): ICD-10-CM

## 2023-06-06 DIAGNOSIS — I25.10 CAD S/P PERCUTANEOUS CORONARY ANGIOPLASTY: ICD-10-CM

## 2023-06-06 DIAGNOSIS — R60.0 BILATERAL LEG EDEMA: Primary | ICD-10-CM

## 2023-06-06 PROCEDURE — 3079F DIAST BP 80-89 MM HG: CPT | Performed by: FAMILY MEDICINE

## 2023-06-06 PROCEDURE — 1160F RVW MEDS BY RX/DR IN RCRD: CPT | Performed by: FAMILY MEDICINE

## 2023-06-06 PROCEDURE — 1036F TOBACCO NON-USER: CPT | Performed by: FAMILY MEDICINE

## 2023-06-06 PROCEDURE — 99214 OFFICE O/P EST MOD 30 MIN: CPT | Performed by: FAMILY MEDICINE

## 2023-06-06 PROCEDURE — 3074F SYST BP LT 130 MM HG: CPT | Performed by: FAMILY MEDICINE

## 2023-06-06 PROCEDURE — 1159F MED LIST DOCD IN RCRD: CPT | Performed by: FAMILY MEDICINE

## 2023-06-06 ASSESSMENT — ENCOUNTER SYMPTOMS: DEPRESSION: 0

## 2023-06-06 NOTE — PATIENT INSTRUCTIONS
I will order an ultrasound to rule out a DVT, stat.  If this shows a blood clot, then anticoagulants will be ordered.  If it does not show a DVT, then a diuretic will be tried.  Return in one week for a recheck.

## 2023-06-06 NOTE — PROGRESS NOTES
Subjective   Patient ID: [id number]     Edu Domingo is a 72 y.o. female who presents for Leg Swelling and Foot Swelling.  HPI  She complains of leg and foot swelling.      She was recently hospitalized for UTI with leukocytosis.  WBC count was >30.  She improved from that but has had swelling in the feet.  Right greater than left.    This started about five days ago.  She has swelling but no pain.  The leg feels a little weaker like it gives out.      No chest pain.  No more SOB than normal.  No fever. No myalgia.  She feels tired. She states she was immobilized while in the hospital.    Objective     /80 (BP Location: Right arm, Patient Position: Sitting)   Temp 36.5 °C (97.7 °F)   Wt 61.7 kg (136 lb)   BMI 22.63 kg/m²      Physical Exam  Constitutional:       Appearance: Normal appearance.   Cardiovascular:      Rate and Rhythm: Normal rate and regular rhythm.      Heart sounds: Normal heart sounds.   Pulmonary:      Effort: Pulmonary effort is normal.      Breath sounds: Normal breath sounds.   Abdominal:      General: Abdomen is flat.      Palpations: Abdomen is soft.      Tenderness: There is no abdominal tenderness.   Musculoskeletal:      Right lower leg: Edema present.      Left lower leg: Edema present.      Comments: Edema in both legs.  Worse on the right (2+) than the left (1+)   Neurological:      Mental Status: She is alert.         Assessment/Plan   Problem List Items Addressed This Visit    None  Visit Diagnoses       Bilateral leg edema    -  Primary    Relevant Orders    Vascular US lower extremity venous duplex bilateral        I will order an ultrasound to rule out a DVT, stat.  If this shows a blood clot, then anticoagulants will be ordered.  If it does not show a DVT, then a diuretic will be tried.  Return in one week for a recheck.      Abram Salmeron DO

## 2023-06-07 DIAGNOSIS — R60.9 PERIPHERAL EDEMA: Primary | ICD-10-CM

## 2023-06-07 RX ORDER — FUROSEMIDE 20 MG/1
20 TABLET ORAL DAILY
Qty: 30 TABLET | Refills: 0 | Status: SHIPPED | OUTPATIENT
Start: 2023-06-07 | End: 2023-07-06 | Stop reason: SDUPTHER

## 2023-06-07 NOTE — TELEPHONE ENCOUNTER
Result Communication  Spoke with pt.  No DVT.  I will prescribe diuretics.  She has a  sulfa allergy but has tolerated furosemide in the past.  Return in one week.      5:02 PM      Results were successfully communicated with the patient and they acknowledged their understanding.  Result Communication        5:02 PM      Results were successfully communicated with the patient and they acknowledged their understanding.  Result Communication        5:02 PM      Results were successfully communicated with the patient and they acknowledged their understanding.

## 2023-06-08 ENCOUNTER — APPOINTMENT (OUTPATIENT)
Dept: PRIMARY CARE | Facility: CLINIC | Age: 73
End: 2023-06-08
Payer: MEDICARE

## 2023-06-15 ENCOUNTER — TELEPHONE (OUTPATIENT)
Dept: PRIMARY CARE | Facility: CLINIC | Age: 73
End: 2023-06-15
Payer: MEDICARE

## 2023-06-15 DIAGNOSIS — N39.0 URINARY TRACT INFECTION WITHOUT HEMATURIA, SITE UNSPECIFIED: Primary | ICD-10-CM

## 2023-06-15 RX ORDER — AMPICILLIN 500 MG/1
500 CAPSULE ORAL EVERY 6 HOURS SCHEDULED
Qty: 40 CAPSULE | Refills: 0 | Status: SHIPPED | OUTPATIENT
Start: 2023-06-15 | End: 2023-06-25

## 2023-06-15 NOTE — TELEPHONE ENCOUNTER
Patient LM on  yesterday stating that UTI symptoms have returned with additional symptoms to previous UTI.  She is asking if she should do another course of antibiotics or a urine culture?  Patient may be reached at [phone]

## 2023-06-20 ENCOUNTER — OFFICE VISIT (OUTPATIENT)
Dept: PRIMARY CARE | Facility: CLINIC | Age: 73
End: 2023-06-20
Payer: MEDICARE

## 2023-06-20 VITALS
TEMPERATURE: 98.5 F | WEIGHT: 133 LBS | SYSTOLIC BLOOD PRESSURE: 130 MMHG | BODY MASS INDEX: 22.13 KG/M2 | DIASTOLIC BLOOD PRESSURE: 70 MMHG

## 2023-06-20 DIAGNOSIS — N10 ACUTE PYELONEPHRITIS: ICD-10-CM

## 2023-06-20 DIAGNOSIS — I25.10 CAD S/P PERCUTANEOUS CORONARY ANGIOPLASTY: ICD-10-CM

## 2023-06-20 DIAGNOSIS — F32.0 DEPRESSION, MAJOR, SINGLE EPISODE, MILD (CMS-HCC): ICD-10-CM

## 2023-06-20 DIAGNOSIS — Z98.61 CAD S/P PERCUTANEOUS CORONARY ANGIOPLASTY: ICD-10-CM

## 2023-06-20 DIAGNOSIS — R10.84 GENERALIZED ABDOMINAL PAIN: Primary | ICD-10-CM

## 2023-06-20 LAB
ALANINE AMINOTRANSFERASE (SGPT) (U/L) IN SER/PLAS: 16 U/L (ref 7–45)
ALBUMIN (G/DL) IN SER/PLAS: 3.9 G/DL (ref 3.4–5)
ALKALINE PHOSPHATASE (U/L) IN SER/PLAS: 117 U/L (ref 33–136)
ANION GAP IN SER/PLAS: 12 MMOL/L (ref 10–20)
APPEARANCE, URINE: CLEAR
ASPARTATE AMINOTRANSFERASE (SGOT) (U/L) IN SER/PLAS: 14 U/L (ref 9–39)
BASOPHILS (10*3/UL) IN BLOOD BY AUTOMATED COUNT: 0.05 X10E9/L (ref 0–0.1)
BASOPHILS/100 LEUKOCYTES IN BLOOD BY AUTOMATED COUNT: 0.6 % (ref 0–2)
BILIRUBIN TOTAL (MG/DL) IN SER/PLAS: 0.6 MG/DL (ref 0–1.2)
BILIRUBIN, URINE: NEGATIVE
BLOOD, URINE: NEGATIVE
CALCIUM (MG/DL) IN SER/PLAS: 9.3 MG/DL (ref 8.6–10.3)
CARBON DIOXIDE, TOTAL (MMOL/L) IN SER/PLAS: 31 MMOL/L (ref 21–32)
CHLORIDE (MMOL/L) IN SER/PLAS: 99 MMOL/L (ref 98–107)
COLOR, URINE: YELLOW
CREATININE (MG/DL) IN SER/PLAS: 1.35 MG/DL (ref 0.5–1.05)
EOSINOPHILS (10*3/UL) IN BLOOD BY AUTOMATED COUNT: 0.09 X10E9/L (ref 0–0.7)
EOSINOPHILS/100 LEUKOCYTES IN BLOOD BY AUTOMATED COUNT: 1 % (ref 0–6)
ERYTHROCYTE DISTRIBUTION WIDTH (RATIO) BY AUTOMATED COUNT: 16.8 % (ref 11.5–14.5)
ERYTHROCYTE MEAN CORPUSCULAR HEMOGLOBIN CONCENTRATION (G/DL) BY AUTOMATED: 31.5 G/DL (ref 32–36)
ERYTHROCYTE MEAN CORPUSCULAR VOLUME (FL) BY AUTOMATED COUNT: 86 FL (ref 80–100)
ERYTHROCYTES (10*6/UL) IN BLOOD BY AUTOMATED COUNT: 4.01 X10E12/L (ref 4–5.2)
GFR FEMALE: 42 ML/MIN/1.73M2
GLUCOSE (MG/DL) IN SER/PLAS: 83 MG/DL (ref 74–99)
GLUCOSE, URINE: NEGATIVE MG/DL
HEMATOCRIT (%) IN BLOOD BY AUTOMATED COUNT: 34.6 % (ref 36–46)
HEMOGLOBIN (G/DL) IN BLOOD: 10.9 G/DL (ref 12–16)
IMMATURE GRANULOCYTES/100 LEUKOCYTES IN BLOOD BY AUTOMATED COUNT: 0.2 % (ref 0–0.9)
KETONES, URINE: NEGATIVE MG/DL
LEUKOCYTE ESTERASE, URINE: NEGATIVE
LEUKOCYTES (10*3/UL) IN BLOOD BY AUTOMATED COUNT: 8.8 X10E9/L (ref 4.4–11.3)
LYMPHOCYTES (10*3/UL) IN BLOOD BY AUTOMATED COUNT: 3.19 X10E9/L (ref 1.2–4.8)
LYMPHOCYTES/100 LEUKOCYTES IN BLOOD BY AUTOMATED COUNT: 36.4 % (ref 13–44)
MONOCYTES (10*3/UL) IN BLOOD BY AUTOMATED COUNT: 0.97 X10E9/L (ref 0.1–1)
MONOCYTES/100 LEUKOCYTES IN BLOOD BY AUTOMATED COUNT: 11.1 % (ref 2–10)
NEUTROPHILS (10*3/UL) IN BLOOD BY AUTOMATED COUNT: 4.44 X10E9/L (ref 1.2–7.7)
NEUTROPHILS/100 LEUKOCYTES IN BLOOD BY AUTOMATED COUNT: 50.7 % (ref 40–80)
NITRITE, URINE: NEGATIVE
PH, URINE: 6 (ref 5–8)
PLATELETS (10*3/UL) IN BLOOD AUTOMATED COUNT: 183 X10E9/L (ref 150–450)
POTASSIUM (MMOL/L) IN SER/PLAS: 4 MMOL/L (ref 3.5–5.3)
PROTEIN TOTAL: 6.4 G/DL (ref 6.4–8.2)
PROTEIN, URINE: NEGATIVE MG/DL
SODIUM (MMOL/L) IN SER/PLAS: 138 MMOL/L (ref 136–145)
SPECIFIC GRAVITY, URINE: 1.01 (ref 1–1.03)
UREA NITROGEN (MG/DL) IN SER/PLAS: 18 MG/DL (ref 6–23)
UROBILINOGEN, URINE: <2 MG/DL (ref 0–1.9)

## 2023-06-20 PROCEDURE — 1160F RVW MEDS BY RX/DR IN RCRD: CPT | Performed by: FAMILY MEDICINE

## 2023-06-20 PROCEDURE — 3075F SYST BP GE 130 - 139MM HG: CPT | Performed by: FAMILY MEDICINE

## 2023-06-20 PROCEDURE — 3078F DIAST BP <80 MM HG: CPT | Performed by: FAMILY MEDICINE

## 2023-06-20 PROCEDURE — 99214 OFFICE O/P EST MOD 30 MIN: CPT | Performed by: FAMILY MEDICINE

## 2023-06-20 PROCEDURE — 81003 URINALYSIS AUTO W/O SCOPE: CPT

## 2023-06-20 PROCEDURE — 87086 URINE CULTURE/COLONY COUNT: CPT

## 2023-06-20 PROCEDURE — 1159F MED LIST DOCD IN RCRD: CPT | Performed by: FAMILY MEDICINE

## 2023-06-20 PROCEDURE — 80053 COMPREHEN METABOLIC PANEL: CPT

## 2023-06-20 PROCEDURE — 85025 COMPLETE CBC W/AUTO DIFF WBC: CPT

## 2023-06-20 PROCEDURE — 1036F TOBACCO NON-USER: CPT | Performed by: FAMILY MEDICINE

## 2023-06-20 ASSESSMENT — ENCOUNTER SYMPTOMS: DEPRESSION: 0

## 2023-06-20 ASSESSMENT — PATIENT HEALTH QUESTIONNAIRE - PHQ9
SUM OF ALL RESPONSES TO PHQ9 QUESTIONS 1 AND 2: 0
1. LITTLE INTEREST OR PLEASURE IN DOING THINGS: NOT AT ALL
2. FEELING DOWN, DEPRESSED OR HOPELESS: NOT AT ALL

## 2023-06-20 NOTE — PROGRESS NOTES
"Subjective   Patient ID: [id number]     Edu Domingo is a 72 y.o. female who presents for Follow-up.  HPI  She is here for a recheck.  She was seen 6/6/23 for leg swelling.  US was done to rule out a DVT--negative.  She was given furosemide.  She is down three pounds this visit.  She has recently been hospitalized for pyelonephritis.  WBC count was greater than 30.     She feels \"exhausted\" with achiness in the legs.      She called on Thursday with complaints of dysuria and lower abdominal pain.  She called in and I called in antibiotics for her.  She is seeing urology on 6/30/23.      She states the antibiotic has helped with the burning with urination. She still has the pain in the lower abdomen that is not all the time.  She gets it two or three times per day.  It lasts maybe a minute or two.   She denies diarrhea.  No constipation.  She does get nausea and had it really bad yesterday.      She had a CT abd/pelvis when in the hospital.  It showed changes in the kidneys c/w pyelo.  Otherwise abdominal organs were unremarkable.  The pain is new since Thursday.  It was happening prior to her hospital stay. Then it went away and now it is coming back.      She was having muscle aching on Friday of last week.  That is better since starting the antibiotic.      Objective     /70 (BP Location: Right arm, Patient Position: Sitting)   Temp 36.9 °C (98.5 °F)   Wt 60.3 kg (133 lb)   BMI 22.13 kg/m²      Physical Exam  Constitutional:       Appearance: Normal appearance.   Cardiovascular:      Rate and Rhythm: Normal rate and regular rhythm.      Heart sounds: Normal heart sounds.   Pulmonary:      Effort: Pulmonary effort is normal.      Breath sounds: Normal breath sounds.   Abdominal:      General: Abdomen is flat.      Palpations: Abdomen is soft. There is no mass.      Tenderness: There is no abdominal tenderness. There is no right CVA tenderness, left CVA tenderness, guarding or rebound.   Musculoskeletal:     "  Right lower leg: No edema.      Left lower leg: No edema.      Comments: Re's neg.   Neurological:      Mental Status: She is alert.         Assessment/Plan   Problem List Items Addressed This Visit          Nervous    Abdominal pain - Primary    Relevant Orders    CBC and Auto Differential    Comprehensive metabolic panel    Urine Culture    Urinalysis with Reflex Microscopic       Genitourinary    UTI (urinary tract infection)    Relevant Orders    CBC and Auto Differential    Comprehensive metabolic panel    Urine Culture    Urinalysis with Reflex Microscopic     I will order labs to evaluate your recurrent symptoms of urine infection that put you in the hospital a few weeks ago.  Continue the ampicillin that seems to be helping you so far.  Return if your symptoms worsen.  Return in two weeks otherwise.  Abram Salmeron, DO

## 2023-06-20 NOTE — PATIENT INSTRUCTIONS
I will order labs to evaluate your recurrent symptoms of urine infection that put you in the hospital a few weeks ago.  Continue the ampicillin that seems to be helping you so far.  Return if your symptoms worsen.  Return in two weeks otherwise.

## 2023-06-21 LAB — URINE CULTURE: NORMAL

## 2023-06-22 ENCOUNTER — TELEPHONE (OUTPATIENT)
Dept: PRIMARY CARE | Facility: CLINIC | Age: 73
End: 2023-06-22
Payer: MEDICARE

## 2023-06-22 NOTE — TELEPHONE ENCOUNTER
----- Message from Abram Salmeron DO sent at 6/21/2023  9:36 AM EDT -----  Please let her know her labs showed a normal white blood cell count.  It did show mildly diminished kidney function compared to before but overall her labs look better compared to when she had to go to the hospital.  She should continue the antibiotic.  We are still waiting on the results of the urine culture.

## 2023-06-23 ENCOUNTER — TELEPHONE (OUTPATIENT)
Dept: PRIMARY CARE | Facility: CLINIC | Age: 73
End: 2023-06-23
Payer: MEDICARE

## 2023-06-23 NOTE — TELEPHONE ENCOUNTER
----- Message from Abram Salmeron DO sent at 6/22/2023  5:24 PM EDT -----  Please let her know her urine culture showed no infection.

## 2023-06-28 ENCOUNTER — PATIENT OUTREACH (OUTPATIENT)
Dept: CARE COORDINATION | Facility: CLINIC | Age: 73
End: 2023-06-28
Payer: MEDICARE

## 2023-06-28 NOTE — PROGRESS NOTES
Call regarding appt. with PCP on 6/6/23 after hospitalization.  At time of outreach call the patient feels as if their condition has improved since last visit.  Reviewed the PCP appointment with the pt and addressed any questions or concerns.

## 2023-07-06 ENCOUNTER — OFFICE VISIT (OUTPATIENT)
Dept: PRIMARY CARE | Facility: CLINIC | Age: 73
End: 2023-07-06
Payer: MEDICARE

## 2023-07-06 VITALS
BODY MASS INDEX: 20.63 KG/M2 | DIASTOLIC BLOOD PRESSURE: 80 MMHG | SYSTOLIC BLOOD PRESSURE: 132 MMHG | WEIGHT: 124 LBS | TEMPERATURE: 98.3 F

## 2023-07-06 DIAGNOSIS — R60.9 PERIPHERAL EDEMA: ICD-10-CM

## 2023-07-06 DIAGNOSIS — I25.10 CAD S/P PERCUTANEOUS CORONARY ANGIOPLASTY: ICD-10-CM

## 2023-07-06 DIAGNOSIS — Z00.00 ROUTINE GENERAL MEDICAL EXAMINATION AT HEALTH CARE FACILITY: Primary | ICD-10-CM

## 2023-07-06 DIAGNOSIS — F32.0 DEPRESSION, MAJOR, SINGLE EPISODE, MILD (CMS-HCC): ICD-10-CM

## 2023-07-06 DIAGNOSIS — F17.211 CIGARETTE NICOTINE DEPENDENCE IN REMISSION: ICD-10-CM

## 2023-07-06 DIAGNOSIS — Z23 NEED FOR VACCINATION: ICD-10-CM

## 2023-07-06 DIAGNOSIS — M19.91 PRIMARY OSTEOARTHRITIS, UNSPECIFIED SITE: ICD-10-CM

## 2023-07-06 DIAGNOSIS — Z98.61 CAD S/P PERCUTANEOUS CORONARY ANGIOPLASTY: ICD-10-CM

## 2023-07-06 PROCEDURE — 3079F DIAST BP 80-89 MM HG: CPT | Performed by: FAMILY MEDICINE

## 2023-07-06 PROCEDURE — 3075F SYST BP GE 130 - 139MM HG: CPT | Performed by: FAMILY MEDICINE

## 2023-07-06 PROCEDURE — 1036F TOBACCO NON-USER: CPT | Performed by: FAMILY MEDICINE

## 2023-07-06 PROCEDURE — 1126F AMNT PAIN NOTED NONE PRSNT: CPT | Performed by: FAMILY MEDICINE

## 2023-07-06 PROCEDURE — 90677 PCV20 VACCINE IM: CPT | Performed by: FAMILY MEDICINE

## 2023-07-06 PROCEDURE — 1160F RVW MEDS BY RX/DR IN RCRD: CPT | Performed by: FAMILY MEDICINE

## 2023-07-06 PROCEDURE — G0439 PPPS, SUBSEQ VISIT: HCPCS | Performed by: FAMILY MEDICINE

## 2023-07-06 PROCEDURE — 1170F FXNL STATUS ASSESSED: CPT | Performed by: FAMILY MEDICINE

## 2023-07-06 PROCEDURE — G0009 ADMIN PNEUMOCOCCAL VACCINE: HCPCS | Performed by: FAMILY MEDICINE

## 2023-07-06 PROCEDURE — 1159F MED LIST DOCD IN RCRD: CPT | Performed by: FAMILY MEDICINE

## 2023-07-06 RX ORDER — IBUPROFEN 200 MG
1 TABLET ORAL EVERY 24 HOURS
Qty: 30 PATCH | Refills: 2 | Status: SHIPPED | OUTPATIENT
Start: 2023-07-06 | End: 2024-02-01 | Stop reason: WASHOUT

## 2023-07-06 RX ORDER — NITROGLYCERIN 0.4 MG/1
0.4 TABLET SUBLINGUAL EVERY 5 MIN PRN
Qty: 90 TABLET | Refills: 0 | Status: SHIPPED | OUTPATIENT
Start: 2023-07-06

## 2023-07-06 RX ORDER — CELECOXIB 200 MG/1
200 CAPSULE ORAL DAILY PRN
Qty: 90 CAPSULE | Refills: 1 | Status: SHIPPED | OUTPATIENT
Start: 2023-07-06 | End: 2024-04-09 | Stop reason: SDUPTHER

## 2023-07-06 RX ORDER — FUROSEMIDE 20 MG/1
20 TABLET ORAL DAILY
Qty: 30 TABLET | Refills: 0 | Status: SHIPPED | OUTPATIENT
Start: 2023-07-06 | End: 2023-08-10 | Stop reason: SDUPTHER

## 2023-07-06 RX ORDER — CARVEDILOL 25 MG/1
25 TABLET ORAL
Qty: 180 TABLET | Refills: 1 | Status: SHIPPED | OUTPATIENT
Start: 2023-07-06 | End: 2023-10-09 | Stop reason: SDUPTHER

## 2023-07-06 RX ORDER — ALPRAZOLAM 0.5 MG/1
0.5 TABLET ORAL 3 TIMES DAILY PRN
Qty: 90 TABLET | Refills: 2 | Status: SHIPPED | OUTPATIENT
Start: 2023-07-06 | End: 2023-12-18 | Stop reason: SDUPTHER

## 2023-07-06 RX ORDER — VENLAFAXINE HYDROCHLORIDE 37.5 MG/1
37.5 CAPSULE, EXTENDED RELEASE ORAL
Qty: 90 CAPSULE | Refills: 1 | Status: SHIPPED | OUTPATIENT
Start: 2023-07-06 | End: 2023-10-09 | Stop reason: SDUPTHER

## 2023-07-06 RX ORDER — CITALOPRAM 40 MG/1
40 TABLET, FILM COATED ORAL DAILY
Qty: 90 TABLET | Refills: 1 | Status: SHIPPED | OUTPATIENT
Start: 2023-07-06 | End: 2024-01-25 | Stop reason: SDUPTHER

## 2023-07-06 ASSESSMENT — ENCOUNTER SYMPTOMS
DEPRESSION: 0
SLEEP DISTURBANCE: 0
HEMATURIA: 0
MYALGIAS: 0
DIARRHEA: 0
NAUSEA: 0
NERVOUS/ANXIOUS: 1
COUGH: 0
HEADACHES: 0
RHINORRHEA: 0
ROS SKIN COMMENTS: NO MOLES GROWING OR CHANGING.
DYSPHORIC MOOD: 0
PALPITATIONS: 0
WHEEZING: 0
WOUND: 0
ARTHRALGIAS: 1
OCCASIONAL FEELINGS OF UNSTEADINESS: 0
SHORTNESS OF BREATH: 0
NUMBNESS: 0
VOICE CHANGE: 0
BACK PAIN: 1
DYSURIA: 1
LOSS OF SENSATION IN FEET: 0
CONSTIPATION: 0
FATIGUE: 0
ABDOMINAL PAIN: 0
SORE THROAT: 0
SINUS PRESSURE: 0
WEAKNESS: 0
FREQUENCY: 1
ADENOPATHY: 0
BLOOD IN STOOL: 0
FEVER: 0
VOMITING: 0

## 2023-07-06 ASSESSMENT — ACTIVITIES OF DAILY LIVING (ADL)
MANAGING_FINANCES: INDEPENDENT
TAKING_MEDICATION: INDEPENDENT
DOING_HOUSEWORK: INDEPENDENT
BATHING: INDEPENDENT
DRESSING: INDEPENDENT
GROCERY_SHOPPING: INDEPENDENT

## 2023-07-06 ASSESSMENT — PATIENT HEALTH QUESTIONNAIRE - PHQ9
SUM OF ALL RESPONSES TO PHQ9 QUESTIONS 1 AND 2: 0
2. FEELING DOWN, DEPRESSED OR HOPELESS: NOT AT ALL
1. LITTLE INTEREST OR PLEASURE IN DOING THINGS: NOT AT ALL

## 2023-07-06 NOTE — PATIENT INSTRUCTIONS
A Prevnar 20 will be given today.  Schedule your mammogram.  Follow up with urology.  Refills were given on many of your medication.  It is very important that you completely stop smoking.  Nicotine patches were refilled.    Return in three months for a recheck and sooner if there are any problems.

## 2023-07-06 NOTE — PROGRESS NOTES
Subjective   Reason for Visit: Edu Domingo is an 72 y.o. female here for a Medicare Wellness visit.          Reviewed all medications by prescribing practitioner or clinical pharmacist (such as prescriptions, OTCs, herbal therapies and supplements) and documented in the medical record.    HPI  Advanced directives are done.  Recommend bring in a copy.    Stopped smoking one and a half months ago.      Using nicotine patches.      She had smoked since college 1974.  1/2 PPD on average.  Patient Care Team:  Abram Salmeron DO as PCP - General  Abram Salmeron DO as PCP - [employer] PCP  Ashley Zapata LPN as Care Manager (Case Management)     Review of Systems   Constitutional:  Negative for fatigue and fever.   HENT:  Negative for rhinorrhea, sinus pressure, sore throat and voice change.    Respiratory:  Negative for cough, shortness of breath and wheezing.    Cardiovascular:  Negative for chest pain, palpitations and leg swelling.   Gastrointestinal:  Negative for abdominal pain, blood in stool, constipation, diarrhea, nausea and vomiting.        Last had abdominal pain last night.  Sharp quickly resolving pain.  Has had UTIs.   Genitourinary:  Positive for dysuria and frequency (no change lately.  just started a medication for incontinence but not available to her yet.). Negative for hematuria and vaginal bleeding.   Musculoskeletal:  Positive for arthralgias and back pain. Negative for myalgias.   Skin:  Negative for rash and wound.        No moles growing or changing.   Neurological:  Negative for syncope, weakness, numbness and headaches.   Hematological:  Negative for adenopathy.   Psychiatric/Behavioral:  Negative for dysphoric mood (depression well controlled with medication), self-injury, sleep disturbance and suicidal ideas. The patient is nervous/anxious.        Objective   Vitals:  /80 (BP Location: Right arm, Patient Position: Sitting)   Temp 36.8 °C (98.3 °F)   Wt 56.2 kg  (124 lb)   BMI 20.63 kg/m²       Physical Exam  Vitals reviewed.   Constitutional:       General: She is not in acute distress.     Appearance: Normal appearance. She is not ill-appearing or toxic-appearing.   HENT:      Head: Normocephalic and atraumatic.      Right Ear: Tympanic membrane, ear canal and external ear normal.      Left Ear: Tympanic membrane, ear canal and external ear normal.      Nose: Nose normal.      Mouth/Throat:      Mouth: Mucous membranes are moist.   Eyes:      Extraocular Movements: Extraocular movements intact.      Conjunctiva/sclera: Conjunctivae normal.      Pupils: Pupils are equal, round, and reactive to light.   Cardiovascular:      Rate and Rhythm: Normal rate and regular rhythm.      Heart sounds: No murmur heard.  Pulmonary:      Effort: Pulmonary effort is normal.      Breath sounds: Normal breath sounds.   Abdominal:      General: Bowel sounds are normal. There is no distension.      Palpations: Abdomen is soft. There is no mass.      Tenderness: There is no abdominal tenderness. There is no guarding or rebound.   Musculoskeletal:         General: No tenderness.      Cervical back: Neck supple.      Right lower leg: Edema present.      Left lower leg: Edema present.      Comments: Minimal edema bilaterally. Pt states worse at the end of the day.   Skin:     Coloration: Skin is not jaundiced or pale.      Findings: No rash.   Neurological:      General: No focal deficit present.      Mental Status: She is alert and oriented to person, place, and time. Mental status is at baseline.   Psychiatric:         Mood and Affect: Mood normal.         Behavior: Behavior normal.         Thought Content: Thought content normal.         Judgment: Judgment normal.         Assessment/Plan   Problem List Items Addressed This Visit          Cardiac and Vasculature    CAD S/P percutaneous coronary angioplasty    Relevant Medications    carvedilol (Coreg) 25 mg tablet    nitroglycerin (Nitrostat)  0.4 mg SL tablet       Mental Health    Depression, major, single episode, mild (CMS/HCC)    Relevant Medications    ALPRAZolam (Xanax) 0.5 mg tablet    citalopram (CeleXA) 40 mg tablet    nitroglycerin (Nitrostat) 0.4 mg SL tablet    venlafaxine XR (Effexor-XR) 37.5 mg 24 hr capsule       Musculoskeletal and Injuries    Osteoarthritis    Relevant Medications    celecoxib (CeleBREX) 200 mg capsule     Other Visit Diagnoses       Routine general medical examination at health care facility    -  Primary    Need for vaccination        Cigarette nicotine dependence in remission        Relevant Medications    nicotine (Nicoderm CQ) 21 mg/24 hr patch          Annual Wellness exam completed   Preventive Health history reviewed:  Labs ordered    Mammogram ordered previously.  Has not yet been set up yet.  Pt agrees to call.  BMD ordered  Cscope done in 2017.  Told to repeat in seven years (next year)  Low dose CT chest for lung cancer screening not indicated.  Had a CT chest in May.  Has had lung cancer.  Depression Screening done  Advanced Directives Discussion Completed  Cardiovascular risk discussed and if needed, lifestyle modifications recommended, including nutritional choices, exercise, and elimination of habits contributing to risk.  We agreed on a plan to reduce the current cardiovascular risk.  See ecalc ASCVD Risk  Plus for data discussed regarding risk and risk reduction opportunities.  Aspirin use was discussed and recommended after reviewing the updated guidelines. Continue the same medications.  Refills were given.    Vaccines:  Influenza given 10/2022  Prevnar 20  Prevnar 13  Pneumovax 23 given 2020.  Shingrix completed 2021.  A Prevnar 20 will be given today.  Schedule your mammogram.  Follow up with urology.  Refills were given on many of your medication.  It is very important that you completely stop smoking.  Nicotine patches were refilled.    Return in three months for a recheck and sooner if  there are any problems.

## 2023-07-28 ENCOUNTER — PATIENT OUTREACH (OUTPATIENT)
Dept: CARE COORDINATION | Facility: CLINIC | Age: 73
End: 2023-07-28
Payer: MEDICARE

## 2023-07-28 NOTE — PROGRESS NOTES
Call placed regarding one month post discharge follow up call. At time of outreach call the patient feels as if their condition has improved since initial visit with PCP or specialist. Questions or concerns regarding recovery period addressed at this time. Reviewed any PCP or specialists progress notes/labs/radiology reports if applicable and addressed any questions or concerns.

## 2023-08-09 DIAGNOSIS — R60.9 PERIPHERAL EDEMA: ICD-10-CM

## 2023-08-09 NOTE — TELEPHONE ENCOUNTER
Refill Lasix 20mg 1qd    Pharm[facility]: [facility] [phone]    LR: 07/06/23  LV: 07/06/23  NV: 10/09/23

## 2023-08-10 RX ORDER — FUROSEMIDE 20 MG/1
20 TABLET ORAL DAILY
Qty: 30 TABLET | Refills: 0 | Status: SHIPPED | OUTPATIENT
Start: 2023-08-10 | End: 2023-10-02 | Stop reason: SDUPTHER

## 2023-08-25 ENCOUNTER — PATIENT OUTREACH (OUTPATIENT)
Dept: CARE COORDINATION | Facility: CLINIC | Age: 73
End: 2023-08-25
Payer: MEDICARE

## 2023-08-25 NOTE — PROGRESS NOTES
Unable to reach patient for 90 day post discharge follow up call.   M with call back number for patient to call if needed   If no voicemail available call attempts x 2 were made to contact the patient to assist with any questions or concerns patient may have.

## 2023-08-30 PROBLEM — L82.1 OTHER SEBORRHEIC KERATOSIS: Status: ACTIVE | Noted: 2022-11-08

## 2023-08-30 PROBLEM — R60.0 EDEMA OF BOTH LOWER EXTREMITIES: Status: ACTIVE | Noted: 2023-08-30

## 2023-08-30 PROBLEM — I25.110 CORONARY ARTERY DISEASE WITH UNSTABLE ANGINA PECTORIS (MULTI): Status: ACTIVE | Noted: 2023-08-30

## 2023-08-30 PROBLEM — D18.01 HEMANGIOMA OF SKIN AND SUBCUTANEOUS TISSUE: Status: ACTIVE | Noted: 2022-11-08

## 2023-08-30 PROBLEM — M18.9 OSTEOARTHRITIS OF CARPOMETACARPAL (CMC) JOINT OF THUMB: Status: ACTIVE | Noted: 2018-06-18

## 2023-08-30 PROBLEM — G47.419 SLEEP ATTACK (HHS-HCC): Status: ACTIVE | Noted: 2023-08-30

## 2023-08-30 PROBLEM — D21.10 BENIGN NEOPLASM OF SOFT TISSUES OF UPPER LIMB: Status: ACTIVE | Noted: 2018-03-26

## 2023-08-30 PROBLEM — G45.8 SUBCLAVIAN STEAL SYNDROME: Status: ACTIVE | Noted: 2023-08-30

## 2023-08-30 PROBLEM — R53.1 GENERAL WEAKNESS: Status: ACTIVE | Noted: 2023-08-30

## 2023-08-30 PROBLEM — N12 PYELONEPHRITIS: Status: ACTIVE | Noted: 2023-08-30

## 2023-08-30 PROBLEM — R31.9 HEMATURIA: Status: ACTIVE | Noted: 2023-08-30

## 2023-08-30 PROBLEM — L57.0 ACTINIC KERATOSIS: Status: ACTIVE | Noted: 2022-11-08

## 2023-08-30 PROBLEM — L81.4 OTHER MELANIN HYPERPIGMENTATION: Status: ACTIVE | Noted: 2022-11-08

## 2023-08-30 PROBLEM — D48.5 NEOPLASM OF UNCERTAIN BEHAVIOR OF SKIN: Status: ACTIVE | Noted: 2022-11-08

## 2023-08-30 PROBLEM — M25.559 GREATER TROCHANTERIC PAIN SYNDROME: Status: ACTIVE | Noted: 2019-10-23

## 2023-08-30 PROBLEM — L02.01 ABSCESS OF FACE: Status: ACTIVE | Noted: 2023-08-30

## 2023-08-30 PROBLEM — R05.3 CHRONIC COUGH: Status: ACTIVE | Noted: 2023-08-30

## 2023-08-30 PROBLEM — I15.0 HYPERTENSIVE RENOVASCULAR DISEASE: Status: ACTIVE | Noted: 2023-08-30

## 2023-08-30 PROBLEM — C44.721 SQUAMOUS CELL CARCINOMA OF SKIN OF LOWER LIMB, INCLUDING HIP: Status: ACTIVE | Noted: 2022-11-08

## 2023-08-30 PROBLEM — Z85.828 PERSONAL HISTORY OF OTHER MALIGNANT NEOPLASM OF SKIN: Status: ACTIVE | Noted: 2022-11-08

## 2023-08-30 PROBLEM — S54.20XA: Status: ACTIVE | Noted: 2018-04-25

## 2023-08-30 RX ORDER — HYDROCORTISONE 25 MG/G
CREAM TOPICAL
COMMUNITY
Start: 2021-08-17 | End: 2024-01-25 | Stop reason: ALTCHOICE

## 2023-08-30 RX ORDER — VIBEGRON 75 MG/1
1 TABLET, FILM COATED ORAL DAILY
COMMUNITY
Start: 2023-08-08

## 2023-08-30 RX ORDER — FLUOROURACIL 50 MG/G
CREAM TOPICAL
COMMUNITY
Start: 2021-08-17 | End: 2024-02-01 | Stop reason: ALTCHOICE

## 2023-08-30 RX ORDER — DOXYCYCLINE 100 MG/1
TABLET ORAL
COMMUNITY
Start: 2022-09-02 | End: 2024-01-25 | Stop reason: ALTCHOICE

## 2023-10-02 DIAGNOSIS — R60.9 PERIPHERAL EDEMA: ICD-10-CM

## 2023-10-02 RX ORDER — FUROSEMIDE 20 MG/1
20 TABLET ORAL DAILY
Qty: 30 TABLET | Refills: 0 | Status: SHIPPED | OUTPATIENT
Start: 2023-10-02 | End: 2023-11-14 | Stop reason: SDUPTHER

## 2023-10-02 NOTE — TELEPHONE ENCOUNTER
Pt is requesting a refill on     Furosemide 20 mg 1 po every day  [facility] 8/10/23  # 30       [phone]  10/09/2023

## 2023-10-09 ENCOUNTER — OFFICE VISIT (OUTPATIENT)
Dept: PRIMARY CARE | Facility: CLINIC | Age: 73
End: 2023-10-09
Payer: MEDICARE

## 2023-10-09 VITALS
SYSTOLIC BLOOD PRESSURE: 130 MMHG | TEMPERATURE: 93.3 F | BODY MASS INDEX: 22.63 KG/M2 | WEIGHT: 136 LBS | DIASTOLIC BLOOD PRESSURE: 80 MMHG

## 2023-10-09 DIAGNOSIS — F32.0 DEPRESSION, MAJOR, SINGLE EPISODE, MILD (CMS-HCC): ICD-10-CM

## 2023-10-09 DIAGNOSIS — M25.50 ARTHRALGIA, UNSPECIFIED JOINT: ICD-10-CM

## 2023-10-09 DIAGNOSIS — Z98.61 CAD S/P PERCUTANEOUS CORONARY ANGIOPLASTY: ICD-10-CM

## 2023-10-09 DIAGNOSIS — M79.10 MYALGIA: ICD-10-CM

## 2023-10-09 DIAGNOSIS — I25.10 CORONARY ARTERY DISEASE INVOLVING NATIVE CORONARY ARTERY OF NATIVE HEART WITHOUT ANGINA PECTORIS: Primary | ICD-10-CM

## 2023-10-09 DIAGNOSIS — I10 PRIMARY HYPERTENSION: ICD-10-CM

## 2023-10-09 DIAGNOSIS — Z23 NEED FOR VACCINATION: ICD-10-CM

## 2023-10-09 DIAGNOSIS — H25.9 SENILE CATARACT OF LEFT EYE, UNSPECIFIED AGE-RELATED CATARACT TYPE: ICD-10-CM

## 2023-10-09 DIAGNOSIS — I25.10 CAD S/P PERCUTANEOUS CORONARY ANGIOPLASTY: ICD-10-CM

## 2023-10-09 PROCEDURE — 1125F AMNT PAIN NOTED PAIN PRSNT: CPT | Performed by: FAMILY MEDICINE

## 2023-10-09 PROCEDURE — G0008 ADMIN INFLUENZA VIRUS VAC: HCPCS | Performed by: FAMILY MEDICINE

## 2023-10-09 PROCEDURE — 1159F MED LIST DOCD IN RCRD: CPT | Performed by: FAMILY MEDICINE

## 2023-10-09 PROCEDURE — 1160F RVW MEDS BY RX/DR IN RCRD: CPT | Performed by: FAMILY MEDICINE

## 2023-10-09 PROCEDURE — 1036F TOBACCO NON-USER: CPT | Performed by: FAMILY MEDICINE

## 2023-10-09 PROCEDURE — 3075F SYST BP GE 130 - 139MM HG: CPT | Performed by: FAMILY MEDICINE

## 2023-10-09 PROCEDURE — 3079F DIAST BP 80-89 MM HG: CPT | Performed by: FAMILY MEDICINE

## 2023-10-09 PROCEDURE — 90662 IIV NO PRSV INCREASED AG IM: CPT | Performed by: FAMILY MEDICINE

## 2023-10-09 PROCEDURE — 99214 OFFICE O/P EST MOD 30 MIN: CPT | Performed by: FAMILY MEDICINE

## 2023-10-09 RX ORDER — CARVEDILOL 25 MG/1
25 TABLET ORAL
Qty: 180 TABLET | Refills: 1 | Status: SHIPPED | OUTPATIENT
Start: 2023-10-09 | End: 2024-04-11 | Stop reason: SDUPTHER

## 2023-10-09 RX ORDER — VENLAFAXINE HYDROCHLORIDE 37.5 MG/1
37.5 CAPSULE, EXTENDED RELEASE ORAL
Qty: 90 CAPSULE | Refills: 1 | Status: SHIPPED | OUTPATIENT
Start: 2023-10-09 | End: 2024-04-09 | Stop reason: SDUPTHER

## 2023-10-09 ASSESSMENT — ENCOUNTER SYMPTOMS: DEPRESSION: 0

## 2023-10-09 NOTE — PATIENT INSTRUCTIONS
I will refill your medication.  I will order labs to evaluate your muscle and joint soreness.  A flu shot will be given today.    Smoking is a common cause of heart attacks, stroke, many cancers, peripheral vascular disease and respiratory infections such as pneumonia.  It is very important to your good health that you stop smoking.  It is good that you have cut down but it is extremely important that you completely stop.  Continue the nicotine patch to help you quit. Smoking cessation can also save you a lot of money in the cost of buying cigarettes.      Return in three months for a recheck.

## 2023-10-09 NOTE — PROGRESS NOTES
"Subjective   Patient ID: [id number]     Edu Domingo is a 72 y.o. female who presents for Follow-up, Med Refill (Carvedilol , Venlafaxine refills needed.), and Flu Vaccine.  HPI  She is here for a recheck and refills on carvedilol and venlafaxine.      BP is 130/80.  Denies chest pain, SOB or dizziness.    She complains that her \"whole entire body hurts\".  Muscle and joint pain throughout her body.  Shoulders, neck pain, knees, hips feet.      This has been worse over the last couple of weeks.  Denies any rash.  She has trouble climbing stairs.      She is requesting a flu shot.    Mood is good on effexor.  Denies depression or anxiety symptoms lately.  No side effects to the effexor.    She is \"kind of\" smoking but \"not really\".  She is smoking less.  She does go a long time without a cigarette but breaks \"when I get hyper\".    Objective     /80 (BP Location: Right arm, Patient Position: Sitting)   Temp 34.1 °C (93.3 °F) (Skin)   Wt 61.7 kg (136 lb)   BMI 22.63 kg/m²      Physical Exam  Constitutional:       Appearance: Normal appearance.   Cardiovascular:      Rate and Rhythm: Normal rate and regular rhythm.      Heart sounds: Normal heart sounds.   Pulmonary:      Effort: Pulmonary effort is normal. No respiratory distress.      Breath sounds: Normal breath sounds.   Musculoskeletal:      Comments: Generalized muscle and joint tenderness throughout.  Tone normal.  No guarding with palpation.     Neurological:      Mental Status: She is alert.   Psychiatric:         Mood and Affect: Mood normal.         Behavior: Behavior normal.         Thought Content: Thought content normal.         Assessment/Plan   Problem List Items Addressed This Visit       CAD S/P percutaneous coronary angioplasty    Relevant Medications    carvedilol (Coreg) 25 mg tablet    Depression, major, single episode, mild (CMS/HCC)    Relevant Medications    venlafaxine XR (Effexor-XR) 37.5 mg 24 hr capsule    Hypertension    Relevant " Orders    Comprehensive metabolic panel    CBC and Auto Differential     Other Visit Diagnoses       Coronary artery disease involving native coronary artery of native heart without angina pectoris    -  Primary    Relevant Medications    carvedilol (Coreg) 25 mg tablet    Need for vaccination        Myalgia        Relevant Orders    CK    Rheumatoid factor    REBECCA with Reflex to JON    Comprehensive metabolic panel    CBC and Auto Differential    Arthralgia, unspecified joint        Relevant Orders    CK    Rheumatoid factor    REBECCA with Reflex to JON    Comprehensive metabolic panel    CBC and Auto Differential    Senile cataract of left eye, unspecified age-related cataract type        Relevant Orders    Referral to Ophthalmology          I will refill your medication.  I will order labs to evaluate your muscle and joint soreness.  A flu shot will be given today.    Smoking is a common cause of heart attacks, stroke, many cancers, peripheral vascular disease and respiratory infections such as pneumonia.  It is very important to your good health that you stop smoking.  It is good that you have cut down but it is extremely important that you completely stop.  Continue the nicotine patch to help you quit. Smoking cessation can also save you a lot of money in the cost of buying cigarettes.      Return in three months for a recheck.    Abram Salmeron, DO

## 2023-10-10 ENCOUNTER — LAB (OUTPATIENT)
Dept: LAB | Facility: LAB | Age: 73
End: 2023-10-10
Payer: MEDICARE

## 2023-10-10 DIAGNOSIS — M79.10 MYALGIA: ICD-10-CM

## 2023-10-10 DIAGNOSIS — I10 PRIMARY HYPERTENSION: ICD-10-CM

## 2023-10-10 DIAGNOSIS — M25.50 ARTHRALGIA, UNSPECIFIED JOINT: ICD-10-CM

## 2023-10-10 LAB
ALBUMIN SERPL BCP-MCNC: 4.7 G/DL (ref 3.4–5)
ALP SERPL-CCNC: 98 U/L (ref 33–136)
ALT SERPL W P-5'-P-CCNC: 21 U/L (ref 7–45)
ANION GAP SERPL CALC-SCNC: 14 MMOL/L (ref 10–20)
AST SERPL W P-5'-P-CCNC: 19 U/L (ref 9–39)
BASOPHILS # BLD AUTO: 0.1 X10*3/UL (ref 0–0.1)
BASOPHILS NFR BLD AUTO: 1.4 %
BILIRUB SERPL-MCNC: 0.5 MG/DL (ref 0–1.2)
BUN SERPL-MCNC: 16 MG/DL (ref 6–23)
CALCIUM SERPL-MCNC: 9.9 MG/DL (ref 8.6–10.3)
CHLORIDE SERPL-SCNC: 100 MMOL/L (ref 98–107)
CK SERPL-CCNC: 59 U/L (ref 0–215)
CO2 SERPL-SCNC: 29 MMOL/L (ref 21–32)
CREAT SERPL-MCNC: 1.29 MG/DL (ref 0.5–1.05)
EOSINOPHIL # BLD AUTO: 0.16 X10*3/UL (ref 0–0.7)
EOSINOPHIL NFR BLD AUTO: 2.2 %
ERYTHROCYTE [DISTWIDTH] IN BLOOD BY AUTOMATED COUNT: 16.5 % (ref 11.5–14.5)
GFR SERPL CREATININE-BSD FRML MDRD: 45 ML/MIN/1.73M*2
GLUCOSE SERPL-MCNC: 75 MG/DL (ref 74–99)
HCT VFR BLD AUTO: 40 % (ref 36–46)
HGB BLD-MCNC: 12.4 G/DL (ref 12–16)
IMM GRANULOCYTES # BLD AUTO: 0.01 X10*3/UL (ref 0–0.7)
IMM GRANULOCYTES NFR BLD AUTO: 0.1 % (ref 0–0.9)
LYMPHOCYTES # BLD AUTO: 2.58 X10*3/UL (ref 1.2–4.8)
LYMPHOCYTES NFR BLD AUTO: 35.6 %
MCH RBC QN AUTO: 26.5 PG (ref 26–34)
MCHC RBC AUTO-ENTMCNC: 31 G/DL (ref 32–36)
MCV RBC AUTO: 86 FL (ref 80–100)
MONOCYTES # BLD AUTO: 0.9 X10*3/UL (ref 0.1–1)
MONOCYTES NFR BLD AUTO: 12.4 %
NEUTROPHILS # BLD AUTO: 3.49 X10*3/UL (ref 1.2–7.7)
NEUTROPHILS NFR BLD AUTO: 48.3 %
NRBC BLD-RTO: 0 /100 WBCS (ref 0–0)
PLATELET # BLD AUTO: 249 X10*3/UL (ref 150–450)
PMV BLD AUTO: 12.6 FL (ref 7.5–11.5)
POTASSIUM SERPL-SCNC: 4.1 MMOL/L (ref 3.5–5.3)
PROT SERPL-MCNC: 7.3 G/DL (ref 6.4–8.2)
RBC # BLD AUTO: 4.68 X10*6/UL (ref 4–5.2)
SODIUM SERPL-SCNC: 139 MMOL/L (ref 136–145)
WBC # BLD AUTO: 7.2 X10*3/UL (ref 4.4–11.3)

## 2023-10-10 PROCEDURE — 80053 COMPREHEN METABOLIC PANEL: CPT

## 2023-10-10 PROCEDURE — 85025 COMPLETE CBC W/AUTO DIFF WBC: CPT

## 2023-10-10 PROCEDURE — 82550 ASSAY OF CK (CPK): CPT

## 2023-10-10 PROCEDURE — 86038 ANTINUCLEAR ANTIBODIES: CPT

## 2023-10-10 PROCEDURE — 36415 COLL VENOUS BLD VENIPUNCTURE: CPT

## 2023-10-10 PROCEDURE — 86431 RHEUMATOID FACTOR QUANT: CPT

## 2023-10-11 LAB
ANA SER QL HEP2 SUBST: NEGATIVE
RHEUMATOID FACT SER NEPH-ACNC: <10 IU/ML (ref 0–15)

## 2023-10-12 ENCOUNTER — TELEPHONE (OUTPATIENT)
Dept: PRIMARY CARE | Facility: CLINIC | Age: 73
End: 2023-10-12
Payer: MEDICARE

## 2023-10-12 DIAGNOSIS — N17.9 AKI (ACUTE KIDNEY INJURY) (CMS-HCC): Primary | ICD-10-CM

## 2023-10-12 NOTE — TELEPHONE ENCOUNTER
Abram Salmeron, DO to [facility] [place] [id number] Clinical Support Staff    Please let her know her labs showed no significant abnormalities.

## 2023-10-12 NOTE — TELEPHONE ENCOUNTER
"Patient states that she looked on-line at her lab results and \"kidney finctions seem to be real out of whack\".  She is requesting a call to discuss these findings.  She may be reached at [phone]  "

## 2023-10-13 NOTE — TELEPHONE ENCOUNTER
Abram Salmeron, DO to You    It is mildly abnormal.  I recommend that she drink a lot of water and recheck a BMP in one week.  I ordered  a BMP

## 2023-10-17 ENCOUNTER — ANCILLARY PROCEDURE (OUTPATIENT)
Dept: RADIOLOGY | Facility: CLINIC | Age: 73
End: 2023-10-17
Payer: MEDICARE

## 2023-10-17 ENCOUNTER — LAB (OUTPATIENT)
Dept: LAB | Facility: LAB | Age: 73
End: 2023-10-17
Payer: MEDICARE

## 2023-10-17 ENCOUNTER — OFFICE VISIT (OUTPATIENT)
Dept: PRIMARY CARE | Facility: CLINIC | Age: 73
End: 2023-10-17
Payer: MEDICARE

## 2023-10-17 VITALS
BODY MASS INDEX: 22.47 KG/M2 | WEIGHT: 135 LBS | SYSTOLIC BLOOD PRESSURE: 144 MMHG | DIASTOLIC BLOOD PRESSURE: 78 MMHG | TEMPERATURE: 97.9 F

## 2023-10-17 DIAGNOSIS — J20.9 ACUTE BRONCHITIS, UNSPECIFIED ORGANISM: ICD-10-CM

## 2023-10-17 DIAGNOSIS — N17.9 AKI (ACUTE KIDNEY INJURY) (CMS-HCC): ICD-10-CM

## 2023-10-17 DIAGNOSIS — J20.9 ACUTE BRONCHITIS, UNSPECIFIED ORGANISM: Primary | ICD-10-CM

## 2023-10-17 LAB
ANION GAP SERPL CALC-SCNC: 11 MMOL/L (ref 10–20)
BUN SERPL-MCNC: 18 MG/DL (ref 6–23)
CALCIUM SERPL-MCNC: 9.6 MG/DL (ref 8.6–10.3)
CHLORIDE SERPL-SCNC: 101 MMOL/L (ref 98–107)
CO2 SERPL-SCNC: 32 MMOL/L (ref 21–32)
CREAT SERPL-MCNC: 1.27 MG/DL (ref 0.5–1.05)
GFR SERPL CREATININE-BSD FRML MDRD: 46 ML/MIN/1.73M*2
GLUCOSE SERPL-MCNC: 88 MG/DL (ref 74–99)
POTASSIUM SERPL-SCNC: 4.2 MMOL/L (ref 3.5–5.3)
SODIUM SERPL-SCNC: 140 MMOL/L (ref 136–145)

## 2023-10-17 PROCEDURE — 1036F TOBACCO NON-USER: CPT | Performed by: FAMILY MEDICINE

## 2023-10-17 PROCEDURE — 1160F RVW MEDS BY RX/DR IN RCRD: CPT | Performed by: FAMILY MEDICINE

## 2023-10-17 PROCEDURE — 71046 X-RAY EXAM CHEST 2 VIEWS: CPT

## 2023-10-17 PROCEDURE — 3077F SYST BP >= 140 MM HG: CPT | Performed by: FAMILY MEDICINE

## 2023-10-17 PROCEDURE — 36415 COLL VENOUS BLD VENIPUNCTURE: CPT

## 2023-10-17 PROCEDURE — 1159F MED LIST DOCD IN RCRD: CPT | Performed by: FAMILY MEDICINE

## 2023-10-17 PROCEDURE — 1125F AMNT PAIN NOTED PAIN PRSNT: CPT | Performed by: FAMILY MEDICINE

## 2023-10-17 PROCEDURE — 3078F DIAST BP <80 MM HG: CPT | Performed by: FAMILY MEDICINE

## 2023-10-17 PROCEDURE — 80048 BASIC METABOLIC PNL TOTAL CA: CPT

## 2023-10-17 PROCEDURE — 71046 X-RAY EXAM CHEST 2 VIEWS: CPT | Performed by: RADIOLOGY

## 2023-10-17 PROCEDURE — 99214 OFFICE O/P EST MOD 30 MIN: CPT | Performed by: FAMILY MEDICINE

## 2023-10-17 RX ORDER — AMOXICILLIN 500 MG/1
500 CAPSULE ORAL EVERY 8 HOURS SCHEDULED
Qty: 30 CAPSULE | Refills: 0 | Status: SHIPPED | OUTPATIENT
Start: 2023-10-17 | End: 2023-10-27

## 2023-10-17 NOTE — PATIENT INSTRUCTIONS
I will order an antibiotics and a chest xray.  Return in one week if this persists.  Return sooner if it worsens.

## 2023-10-17 NOTE — PROGRESS NOTES
"Subjective   Patient ID: [id number]     Edu Domingo is a 72 y.o. female who presents for Cough (congestion).  HPI  She complains of coughing and chest congestion.  She had a fever on Wednesday and Thursday of last week.  Then she started coughing Saturday.   She had nasal congestion. Denies sinus pain.  No sore throat except some mild discomfort \"because of the drainage\".      Chest congestion.  Has some SOB.  Albuterol inhaler temporarily helps.    Objective     /78 (BP Location: Right arm)   Temp 36.6 °C (97.9 °F)   Wt 61.2 kg (135 lb)   BMI 22.47 kg/m²      Physical Exam  Constitutional:       Appearance: Normal appearance.   Cardiovascular:      Rate and Rhythm: Normal rate and regular rhythm.      Heart sounds: Normal heart sounds.   Pulmonary:      Effort: Pulmonary effort is normal. No respiratory distress.      Breath sounds: Rhonchi present.      Comments: LLL has been removed.  LLL rhonchi noted.  Neurological:      Mental Status: She is alert.         Assessment/Plan   Problem List Items Addressed This Visit       Acute bronchitis - Primary    Relevant Medications    amoxicillin (Amoxil) 500 mg capsule    Other Relevant Orders    XR chest 2 views     I will order an antibiotics and a chest xray.  Return in one week if this persists.  Return sooner if it worsens.  Continue the albuterol as needed.  Abarm Salmeron, DO   "

## 2023-10-19 ENCOUNTER — TELEPHONE (OUTPATIENT)
Dept: PRIMARY CARE | Facility: CLINIC | Age: 73
End: 2023-10-19
Payer: MEDICARE

## 2023-10-19 NOTE — TELEPHONE ENCOUNTER
----- Message from Abram Salmeron DO sent at 10/18/2023 12:36 PM EDT -----  Please let her know her labs showed some improvement in her kidney function.  It is still very mildly diminished and not that concerning at this point.  Stay well hydrated and this can be rechecked at her next regular appointment.

## 2023-10-19 NOTE — TELEPHONE ENCOUNTER
----- Message from Abram Salmeron DO sent at 10/19/2023  1:42 PM EDT -----  Please let her know her chest xray showed no new problems.

## 2023-10-30 ENCOUNTER — OFFICE VISIT (OUTPATIENT)
Dept: CARDIOLOGY | Facility: CLINIC | Age: 73
End: 2023-10-30
Payer: MEDICARE

## 2023-10-30 VITALS
WEIGHT: 134 LBS | SYSTOLIC BLOOD PRESSURE: 130 MMHG | HEART RATE: 119 BPM | DIASTOLIC BLOOD PRESSURE: 72 MMHG | HEIGHT: 65 IN | BODY MASS INDEX: 22.33 KG/M2

## 2023-10-30 DIAGNOSIS — I25.10 CAD S/P PERCUTANEOUS CORONARY ANGIOPLASTY: ICD-10-CM

## 2023-10-30 DIAGNOSIS — R00.2 PALPITATIONS: ICD-10-CM

## 2023-10-30 DIAGNOSIS — R55 NEAR SYNCOPE: ICD-10-CM

## 2023-10-30 DIAGNOSIS — Z98.61 CAD S/P PERCUTANEOUS CORONARY ANGIOPLASTY: ICD-10-CM

## 2023-10-30 DIAGNOSIS — I47.19 ATRIAL TACHYCARDIA (CMS-HCC): Primary | ICD-10-CM

## 2023-10-30 DIAGNOSIS — E78.2 MIXED HYPERLIPIDEMIA: ICD-10-CM

## 2023-10-30 DIAGNOSIS — R94.31 ABNORMAL EKG: ICD-10-CM

## 2023-10-30 PROCEDURE — 99213 OFFICE O/P EST LOW 20 MIN: CPT | Performed by: INTERNAL MEDICINE

## 2023-10-30 PROCEDURE — 1159F MED LIST DOCD IN RCRD: CPT | Performed by: INTERNAL MEDICINE

## 2023-10-30 PROCEDURE — 1036F TOBACCO NON-USER: CPT | Performed by: INTERNAL MEDICINE

## 2023-10-30 PROCEDURE — 1160F RVW MEDS BY RX/DR IN RCRD: CPT | Performed by: INTERNAL MEDICINE

## 2023-10-30 PROCEDURE — 93000 ELECTROCARDIOGRAM COMPLETE: CPT | Performed by: INTERNAL MEDICINE

## 2023-10-30 PROCEDURE — 3075F SYST BP GE 130 - 139MM HG: CPT | Performed by: INTERNAL MEDICINE

## 2023-10-30 PROCEDURE — 1125F AMNT PAIN NOTED PAIN PRSNT: CPT | Performed by: INTERNAL MEDICINE

## 2023-10-30 PROCEDURE — 3008F BODY MASS INDEX DOCD: CPT | Performed by: INTERNAL MEDICINE

## 2023-10-30 PROCEDURE — 3078F DIAST BP <80 MM HG: CPT | Performed by: INTERNAL MEDICINE

## 2023-11-14 DIAGNOSIS — R60.9 PERIPHERAL EDEMA: ICD-10-CM

## 2023-11-14 RX ORDER — FUROSEMIDE 20 MG/1
20 TABLET ORAL DAILY
Qty: 30 TABLET | Refills: 0 | Status: SHIPPED | OUTPATIENT
Start: 2023-11-14 | End: 2024-01-25 | Stop reason: SDUPTHER

## 2023-11-14 NOTE — TELEPHONE ENCOUNTER
REFILL REQUEST    Med: Furosemide   Med Dose: 20 mg  Med Frequency: one tab daily    Pharmacy: [facility]  Pharmacy City: [place]    LR: 10/02/2023  LV: 10/09/2023  NV: 01/11/2024

## 2023-12-06 ENCOUNTER — OFFICE VISIT (OUTPATIENT)
Dept: NEUROLOGY | Facility: CLINIC | Age: 73
End: 2023-12-06
Payer: MEDICARE

## 2023-12-06 VITALS
HEIGHT: 65 IN | DIASTOLIC BLOOD PRESSURE: 80 MMHG | HEART RATE: 72 BPM | TEMPERATURE: 97.6 F | BODY MASS INDEX: 21.66 KG/M2 | WEIGHT: 130 LBS | SYSTOLIC BLOOD PRESSURE: 134 MMHG

## 2023-12-06 DIAGNOSIS — R42 POSTURAL DIZZINESS WITH PRESYNCOPE: Primary | ICD-10-CM

## 2023-12-06 DIAGNOSIS — R55 POSTURAL DIZZINESS WITH PRESYNCOPE: Primary | ICD-10-CM

## 2023-12-06 PROBLEM — H25.13 NUCLEAR SCLEROTIC CATARACT OF BOTH EYES: Status: ACTIVE | Noted: 2023-12-06

## 2023-12-06 PROCEDURE — 1125F AMNT PAIN NOTED PAIN PRSNT: CPT | Performed by: STUDENT IN AN ORGANIZED HEALTH CARE EDUCATION/TRAINING PROGRAM

## 2023-12-06 PROCEDURE — 99214 OFFICE O/P EST MOD 30 MIN: CPT | Performed by: STUDENT IN AN ORGANIZED HEALTH CARE EDUCATION/TRAINING PROGRAM

## 2023-12-06 PROCEDURE — 3075F SYST BP GE 130 - 139MM HG: CPT | Performed by: STUDENT IN AN ORGANIZED HEALTH CARE EDUCATION/TRAINING PROGRAM

## 2023-12-06 PROCEDURE — 3008F BODY MASS INDEX DOCD: CPT | Performed by: STUDENT IN AN ORGANIZED HEALTH CARE EDUCATION/TRAINING PROGRAM

## 2023-12-06 PROCEDURE — 1160F RVW MEDS BY RX/DR IN RCRD: CPT | Performed by: STUDENT IN AN ORGANIZED HEALTH CARE EDUCATION/TRAINING PROGRAM

## 2023-12-06 PROCEDURE — 1159F MED LIST DOCD IN RCRD: CPT | Performed by: STUDENT IN AN ORGANIZED HEALTH CARE EDUCATION/TRAINING PROGRAM

## 2023-12-06 PROCEDURE — 3079F DIAST BP 80-89 MM HG: CPT | Performed by: STUDENT IN AN ORGANIZED HEALTH CARE EDUCATION/TRAINING PROGRAM

## 2023-12-06 ASSESSMENT — COLUMBIA-SUICIDE SEVERITY RATING SCALE - C-SSRS
1. IN THE PAST MONTH, HAVE YOU WISHED YOU WERE DEAD OR WISHED YOU COULD GO TO SLEEP AND NOT WAKE UP?: NO
2. HAVE YOU ACTUALLY HAD ANY THOUGHTS OF KILLING YOURSELF?: NO

## 2023-12-06 ASSESSMENT — ENCOUNTER SYMPTOMS
LOSS OF SENSATION IN FEET: 1
DEPRESSION: 0
OCCASIONAL FEELINGS OF UNSTEADINESS: 1

## 2023-12-06 NOTE — PROGRESS NOTES
Subjective   Edu Domingo is a 73 y.o.   female who presets for follow up.   HPI    Last seen 5/15/2023 for episodes of LOC. At that time noted to have positional dizziness and planned for tilt table testing.     Patient seen today. She states she has been episode-free for more than a year up until this am when she had another episode of dizziness. She was in the toilet, prior to getting up she felt ' sick to the stomach', hot flashing, and dizziness. She had to hold onto her counter for a minute or so until the dizziness went away. States she still feels foggy from the event.     As for her left hand tremor, patient states she notices it when she is holding something like a mug. Gets her nails done regularly without issues. No tremor noticed at rest. Stable.       Objective   Neurological Exam  Mental Status  Awake, alert and oriented to person, place and time.    Cranial Nerves  CN II: Visual fields full to confrontation.  CN III, IV, VI: Extraocular movements intact bilaterally.  CN V: Facial sensation is normal.  CN VII: Full and symmetric facial movement.    Motor  Normal muscle bulk throughout. No fasciculations present. Increased muscle tone. Mildly; only noted on distraction. Strength is 5/5 throughout all four extremities.    Coordination  Right: Finger-to-nose normal. Rapid alternating movement normal. Heel-to-shin normal.Left: Finger-to-nose normal. Rapid alternating movement normal. Heel-to-shin normal.    Gait  Normal casual, toe, heel and tandem gait.    Physical Exam  Eyes:      Extraocular Movements: Extraocular movements intact.   Neurological:      Motor: Motor strength is normal.      I personally reviewed laboratory, radiographic, and medical studies which were pertinent for negative REBECCA, rheumatoid factor, cbc. Mild elevation in creatinine noted.    Assessment/Plan   Diagnoses and all orders for this visit:  Postural dizziness with presyncope  -     Autonomic Testing; Future    1. Syncope: had  couple episodes that was initially concerning for position change-related presyncope; however today patient states the events happened while sitting. This am's event is more indicative of a vasovagal episode. She does have hx of atrial tachycardia and she is on a beta blocking agent (coreg) but it is unclear how much of these episodes are related to the atrial tachycardia.   Will refer for tilt table testing and discussed with patient the test can be done if there are further episodes or more suspicion for orthostatic syncope.     2. L hand tremor: by hx, more action related than resting. Mildly increased tone on distraction but no significant bradykinesia noted. Continue to monitor.        Follow-up in 6 months     Patient seen, evaluated and discussed with attending physician, Dr. Harman.    Monie Escoto PGY5  Vascular Neurology Fellow     Attending addendum:   I saw and evaluated the patient and agree with the above documentation  Possible orthostatic hypotension with presyncope but also with vasovagal syncope as well  Will refer for tilt table testing  Follow-up in 6 months

## 2023-12-08 ENCOUNTER — TELEPHONE (OUTPATIENT)
Dept: PRIMARY CARE | Facility: CLINIC | Age: 73
End: 2023-12-08
Payer: MEDICARE

## 2023-12-08 NOTE — PROGRESS NOTES
Patient ID: Edu Domingo is a 73 y.o. female.  Primary Care Provider: DO Oneil Aviles    Referred by Dr. 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    21 R vulvar biopsy:  DAMIEN-3 with HPV cytopathic effect.    1/2 PPD smoker x 30 years       PMH:   Hypertension, overactive bladder, hypercholesterolemia, anxiety. History of ?lung cancer in  (underwent LL lobectomy followed by chemo)- per patient they did not think lung was the primary. History of skin cancer on legs.    PSH: Left lower lobectomy , right arm ganglion cyst removal, lesions excised on legs for skin cancer     Family history: Mother with rectal cancer, father with ?pancreatic cancer, maternal aunt breast, maternal aunt cervix cancer    ,  x2. No abnormal paps last in . Menopause in .       Objective    Visit Vitals  /68 (BP Location: Right arm, Patient Position: Sitting, BP Cuff Size: Adult)   Pulse 70   Temp 36.3 °C (97.3 °F) (Temporal)   Resp 16        Interval history: Patient is a 73 year old female s/p R wide excision of anterior vulvar/thigh lesion, R wide excision of perianal lesion, and R thigh punch biopsy on 2021 for VIN3.  Been using Estrace for vaginal atrophy.  Was having chronic UTIs.  Patient states she is on new medication for bladder which has been helping.  Does not have to wear a pad as often.  She states she has been having some LLQ pain, sharp shooting, will last for a few days.   Denies any new vulvar pruritus.  Patient is not currently sexually active. Mammogram is up to date.  Needs to see dermatology for some new suspicious skin cancer areas.      Physical Exam:    Constitutional: Doing well. ROSANA  Eyes: PERRL  ENMT: Moist mucus membranes  Head/Neck: Supple. Symmetrical  Cardiovascular: Regular, rate and rhythm. 2+ equal pulses of the extremities  Respiratory/Thorax: CTA. RRR. Chest rise symmetrical.  Gastrointestinal:  Non-distended, soft, non-tender  Genitourinary:   Cervical os visualized with no lesions, no CMT, small mobile uterus, no adnexal masses noted. Smooth vaginal walls.  Vulva with no lesions noted, well healed incision.   Musculoskeletal: ROM intact, no joint swelling, normal strength  Extremities: No edema  Neurological: Alert and oriented x 3. Pleasant and cooperative.  Lymphatic: No lymphadenopathy. No lymphedema  Psychological: Appropriate mood and behavior  Skin: Warm and dry, no lesions, no rashes    A complete review of systems was performed and all systems were normal except what is noted in the interval history.          Assessment/Plan  Patient is a 73 year old female s/p R wide excision of anterior vulvar/thigh lesion, R wide excision of perianal lesion, and R thigh punch biopsy on 6/2/2021 for VIN3. LLQ pain.       PLAN:  Pelvic ultrasound, F/U results  Telehealth visit to discuss results  F/U in 6 months or as needed

## 2023-12-08 NOTE — TELEPHONE ENCOUNTER
We need to get her O2 recertified.  We received chart notes from July and nothing was mentioned about the need for oxygen, so insurance will not cover.  It can be mentioned in med list.  Please advise.      Christy may  be reached at [phone] ext [phone]

## 2023-12-12 ENCOUNTER — OFFICE VISIT (OUTPATIENT)
Dept: GYNECOLOGIC ONCOLOGY | Facility: CLINIC | Age: 73
End: 2023-12-12
Payer: MEDICARE

## 2023-12-12 VITALS
HEART RATE: 70 BPM | WEIGHT: 132.5 LBS | SYSTOLIC BLOOD PRESSURE: 135 MMHG | BODY MASS INDEX: 22.05 KG/M2 | TEMPERATURE: 97.3 F | RESPIRATION RATE: 16 BRPM | OXYGEN SATURATION: 96 % | DIASTOLIC BLOOD PRESSURE: 68 MMHG

## 2023-12-12 DIAGNOSIS — R10.32 LEFT LOWER QUADRANT PAIN: Primary | ICD-10-CM

## 2023-12-12 PROCEDURE — 99214 OFFICE O/P EST MOD 30 MIN: CPT | Performed by: NURSE PRACTITIONER

## 2023-12-12 PROCEDURE — 1159F MED LIST DOCD IN RCRD: CPT | Performed by: NURSE PRACTITIONER

## 2023-12-12 PROCEDURE — 1160F RVW MEDS BY RX/DR IN RCRD: CPT | Performed by: NURSE PRACTITIONER

## 2023-12-12 PROCEDURE — 3075F SYST BP GE 130 - 139MM HG: CPT | Performed by: NURSE PRACTITIONER

## 2023-12-12 PROCEDURE — 3078F DIAST BP <80 MM HG: CPT | Performed by: NURSE PRACTITIONER

## 2023-12-12 PROCEDURE — 1126F AMNT PAIN NOTED NONE PRSNT: CPT | Performed by: NURSE PRACTITIONER

## 2023-12-12 PROCEDURE — 3008F BODY MASS INDEX DOCD: CPT | Performed by: NURSE PRACTITIONER

## 2023-12-12 ASSESSMENT — PAIN SCALES - GENERAL: PAINLEVEL: 0-NO PAIN

## 2023-12-15 ENCOUNTER — HOSPITAL ENCOUNTER (OUTPATIENT)
Dept: RADIOLOGY | Facility: HOSPITAL | Age: 73
Discharge: HOME | End: 2023-12-15
Payer: MEDICARE

## 2023-12-15 DIAGNOSIS — R10.32 LEFT LOWER QUADRANT PAIN: ICD-10-CM

## 2023-12-15 PROCEDURE — 76830 TRANSVAGINAL US NON-OB: CPT

## 2023-12-15 PROCEDURE — 76830 TRANSVAGINAL US NON-OB: CPT | Performed by: RADIOLOGY

## 2023-12-15 PROCEDURE — 76856 US EXAM PELVIC COMPLETE: CPT | Performed by: RADIOLOGY

## 2023-12-18 ENCOUNTER — OFFICE VISIT (OUTPATIENT)
Dept: PRIMARY CARE | Facility: CLINIC | Age: 73
End: 2023-12-18
Payer: MEDICARE

## 2023-12-18 VITALS
OXYGEN SATURATION: 98 % | TEMPERATURE: 96.6 F | BODY MASS INDEX: 22.8 KG/M2 | DIASTOLIC BLOOD PRESSURE: 85 MMHG | HEART RATE: 76 BPM | WEIGHT: 137 LBS | SYSTOLIC BLOOD PRESSURE: 195 MMHG

## 2023-12-18 DIAGNOSIS — C34.90 ADENOCARCINOMA OF LUNG, UNSPECIFIED LATERALITY (MULTI): ICD-10-CM

## 2023-12-18 DIAGNOSIS — G47.36 NOCTURNAL HYPOXEMIA DUE TO EMPHYSEMA (MULTI): ICD-10-CM

## 2023-12-18 DIAGNOSIS — I25.10 CAD S/P PERCUTANEOUS CORONARY ANGIOPLASTY: ICD-10-CM

## 2023-12-18 DIAGNOSIS — F32.0 DEPRESSION, MAJOR, SINGLE EPISODE, MILD (CMS-HCC): ICD-10-CM

## 2023-12-18 DIAGNOSIS — I10 PRIMARY HYPERTENSION: Primary | ICD-10-CM

## 2023-12-18 DIAGNOSIS — J43.9 NOCTURNAL HYPOXEMIA DUE TO EMPHYSEMA (MULTI): ICD-10-CM

## 2023-12-18 DIAGNOSIS — Z98.61 CAD S/P PERCUTANEOUS CORONARY ANGIOPLASTY: ICD-10-CM

## 2023-12-18 PROCEDURE — 1160F RVW MEDS BY RX/DR IN RCRD: CPT | Performed by: FAMILY MEDICINE

## 2023-12-18 PROCEDURE — 3008F BODY MASS INDEX DOCD: CPT | Performed by: FAMILY MEDICINE

## 2023-12-18 PROCEDURE — 1126F AMNT PAIN NOTED NONE PRSNT: CPT | Performed by: FAMILY MEDICINE

## 2023-12-18 PROCEDURE — 3079F DIAST BP 80-89 MM HG: CPT | Performed by: FAMILY MEDICINE

## 2023-12-18 PROCEDURE — 99214 OFFICE O/P EST MOD 30 MIN: CPT | Performed by: FAMILY MEDICINE

## 2023-12-18 PROCEDURE — 3077F SYST BP >= 140 MM HG: CPT | Performed by: FAMILY MEDICINE

## 2023-12-18 PROCEDURE — 1159F MED LIST DOCD IN RCRD: CPT | Performed by: FAMILY MEDICINE

## 2023-12-18 RX ORDER — AMLODIPINE BESYLATE 2.5 MG/1
2.5 TABLET ORAL DAILY
Qty: 30 TABLET | Refills: 1 | Status: SHIPPED | OUTPATIENT
Start: 2023-12-18 | End: 2024-02-01 | Stop reason: SDUPTHER

## 2023-12-18 RX ORDER — ALPRAZOLAM 0.5 MG/1
0.5 TABLET ORAL 3 TIMES DAILY PRN
Qty: 90 TABLET | Refills: 0 | Status: SHIPPED | OUTPATIENT
Start: 2023-12-18 | End: 2024-02-01 | Stop reason: SDUPTHER

## 2023-12-18 RX ORDER — ATORVASTATIN CALCIUM 80 MG/1
80 TABLET, FILM COATED ORAL DAILY
Qty: 90 TABLET | Refills: 1 | Status: SHIPPED | OUTPATIENT
Start: 2023-12-18

## 2023-12-18 NOTE — PROGRESS NOTES
Subjective   Patient ID: [id number]     Edu Domingo is a 73 y.o. female who presents for O2 certification.  HPI  She is here today to get certification for use of home oxygen.      Her blood pressure is very high, 242/96.      She is on carvedilol 25 mg bid, lisinopril 20 mg daily, furosemide 20 mg daily,     She feels her normal self.  No chest pain, headache, dizziness, sob or heart palpitations.    Uses the oxygen just at night.  She was sent home from the hospital with it.  She thinks it helps her breath well at night.     She missed her lisinopril today.        Objective     BP (!) 195/85 (BP Location: Right arm, Patient Position: Sitting)   Pulse 76   Temp 35.9 °C (96.6 °F) (Skin)   Wt 62.1 kg (137 lb)   LMP  (LMP Unknown)   SpO2 98% Comment: room air.  BMI 22.80 kg/m²      Physical Exam  Constitutional:       General: She is not in acute distress.     Appearance: Normal appearance. She is not ill-appearing or toxic-appearing.   Cardiovascular:      Rate and Rhythm: Normal rate and regular rhythm.      Heart sounds: Normal heart sounds.   Pulmonary:      Effort: Pulmonary effort is normal. No respiratory distress.      Breath sounds: Normal breath sounds.   Abdominal:      General: Abdomen is flat.      Palpations: Abdomen is soft.   Neurological:      General: No focal deficit present.      Mental Status: She is alert.      Coordination: Coordination normal.      Gait: Gait normal.         Assessment/Plan   Problem List Items Addressed This Visit       Adenocarcinoma of lung (CMS/HCC)    CAD S/P percutaneous coronary angioplasty    Relevant Medications    amLODIPine (Norvasc) 2.5 mg tablet    atorvastatin (Lipitor) 80 mg tablet    Other Relevant Orders    Disability Placard    Depression, major, single episode, mild (CMS/HCC)    Relevant Medications    ALPRAZolam (Xanax) 0.5 mg tablet    Hypertension - Primary    Relevant Medications    amLODIPine (Norvasc) 2.5 mg tablet     Other Visit Diagnoses        Nocturnal hypoxemia due to emphysema (CMS/HCC)            I will add on amlodipine 2.5 mg daily.  You should take the lisinopril daily (you missed it today).  Continue the carvedilol 25 mg bid. Return in two weeks for a recheck.  I will renew your disability placard.  Return sooner if you worsen.  I will send a form to renew your nocturnal oxygen therapy at home.    Abram Salmeron, DO

## 2023-12-18 NOTE — PATIENT INSTRUCTIONS
I will add on amlodipine 2.5 mg daily.  You should take the lisinopril daily (you missed it today).  Continue the carvedilol 25 mg bid. Return in two weeks for a recheck.  I will renew your disability placard.  Return sooner if you worsen.  I will send a form to renew your nocturnal oxygen therapy at home.

## 2023-12-21 ENCOUNTER — TELEMEDICINE (OUTPATIENT)
Dept: GYNECOLOGIC ONCOLOGY | Facility: CLINIC | Age: 73
End: 2023-12-21
Payer: MEDICARE

## 2023-12-21 DIAGNOSIS — R10.32 LEFT LOWER QUADRANT ABDOMINAL PAIN: Primary | ICD-10-CM

## 2023-12-21 DIAGNOSIS — N90.3 VULVAR DYSPLASIA: ICD-10-CM

## 2023-12-21 PROCEDURE — 99212 OFFICE O/P EST SF 10 MIN: CPT | Performed by: NURSE PRACTITIONER

## 2023-12-21 NOTE — PROGRESS NOTES
Patient ID: Edu Domingo is a 73 y.o. female.  Primary Care Provider: DO Oneil Aviles    Referred by Dr. 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    21 R vulvar biopsy:  DAMIEN-3 with HPV cytopathic effect.    1/2 PPD smoker x 30 years       PMH:   Hypertension, overactive bladder, hypercholesterolemia, anxiety. History of ?lung cancer in  (underwent LL lobectomy followed by chemo)- per patient they did not think lung was the primary. History of skin cancer on legs.    PSH: Left lower lobectomy , right arm ganglion cyst removal, lesions excised on legs for skin cancer     Family history: Mother with rectal cancer, father with ?pancreatic cancer, maternal aunt breast, maternal aunt cervix cancer    ,  x2. No abnormal paps last in 2018. Menopause in .     This is virtual visit to discuss results.      Interval history: Patient is a 73 year old female s/p R wide excision of anterior vulvar/thigh lesion, R wide excision of perianal lesion, and R thigh punch biopsy on 2021 for VIN3.  Seen two weeks ago with LLQ pain, had pelvic ultrasound.  This is telehealth visit to discuss results.      Pelvic ultrasound 12/15/23:    IMPRESSION:  1. Suboptimally visualized uterus. Nonvisualization of the  endometrial stripe.  2. Nonvisualization of the ovaries. No adnexal masses. No free fluid.       Patient states the pain is not as consistent, will have days without pain.          Assessment/Plan  Patient is a 73 year old female s/p R wide excision of anterior vulvar/thigh lesion, R wide excision of perianal lesion, and R thigh punch biopsy on 2021 for VIN3. Normal pelvic ultrasound.        PLAN: F/U in 6 months as scheduled or as needed

## 2024-01-04 ENCOUNTER — OFFICE VISIT (OUTPATIENT)
Dept: PRIMARY CARE | Facility: CLINIC | Age: 74
End: 2024-01-04
Payer: MEDICARE

## 2024-01-04 VITALS
DIASTOLIC BLOOD PRESSURE: 80 MMHG | TEMPERATURE: 97.4 F | BODY MASS INDEX: 22.47 KG/M2 | SYSTOLIC BLOOD PRESSURE: 130 MMHG | WEIGHT: 135 LBS

## 2024-01-04 DIAGNOSIS — Z98.61 CAD S/P PERCUTANEOUS CORONARY ANGIOPLASTY: ICD-10-CM

## 2024-01-04 DIAGNOSIS — J43.9 NOCTURNAL HYPOXEMIA DUE TO EMPHYSEMA (MULTI): ICD-10-CM

## 2024-01-04 DIAGNOSIS — G47.36 NOCTURNAL HYPOXEMIA DUE TO EMPHYSEMA (MULTI): ICD-10-CM

## 2024-01-04 DIAGNOSIS — N18.31 STAGE 3A CHRONIC KIDNEY DISEASE (MULTI): ICD-10-CM

## 2024-01-04 DIAGNOSIS — H60.503 ACUTE OTITIS EXTERNA OF BOTH EARS, UNSPECIFIED TYPE: ICD-10-CM

## 2024-01-04 DIAGNOSIS — I47.29 NON-SUSTAINED VENTRICULAR TACHYCARDIA (MULTI): ICD-10-CM

## 2024-01-04 DIAGNOSIS — F32.0 DEPRESSION, MAJOR, SINGLE EPISODE, MILD (CMS-HCC): ICD-10-CM

## 2024-01-04 DIAGNOSIS — C34.90 ADENOCARCINOMA OF LUNG, UNSPECIFIED LATERALITY (MULTI): ICD-10-CM

## 2024-01-04 DIAGNOSIS — I25.10 CAD S/P PERCUTANEOUS CORONARY ANGIOPLASTY: ICD-10-CM

## 2024-01-04 DIAGNOSIS — I10 PRIMARY HYPERTENSION: Primary | ICD-10-CM

## 2024-01-04 PROCEDURE — 1160F RVW MEDS BY RX/DR IN RCRD: CPT | Performed by: FAMILY MEDICINE

## 2024-01-04 PROCEDURE — 3075F SYST BP GE 130 - 139MM HG: CPT | Performed by: FAMILY MEDICINE

## 2024-01-04 PROCEDURE — 1159F MED LIST DOCD IN RCRD: CPT | Performed by: FAMILY MEDICINE

## 2024-01-04 PROCEDURE — 99214 OFFICE O/P EST MOD 30 MIN: CPT | Performed by: FAMILY MEDICINE

## 2024-01-04 PROCEDURE — 3008F BODY MASS INDEX DOCD: CPT | Performed by: FAMILY MEDICINE

## 2024-01-04 PROCEDURE — 1126F AMNT PAIN NOTED NONE PRSNT: CPT | Performed by: FAMILY MEDICINE

## 2024-01-04 PROCEDURE — 3079F DIAST BP 80-89 MM HG: CPT | Performed by: FAMILY MEDICINE

## 2024-01-04 RX ORDER — NEOMYCIN SULFATE, POLYMYXIN B SULFATE AND HYDROCORTISONE 10; 3.5; 1 MG/ML; MG/ML; [USP'U]/ML
3 SUSPENSION/ DROPS AURICULAR (OTIC) 4 TIMES DAILY
Qty: 10 ML | Refills: 0 | Status: SHIPPED | OUTPATIENT
Start: 2024-01-04 | End: 2024-01-04 | Stop reason: SDUPTHER

## 2024-01-04 RX ORDER — NEOMYCIN SULFATE, POLYMYXIN B SULFATE AND HYDROCORTISONE 10; 3.5; 1 MG/ML; MG/ML; [USP'U]/ML
3 SUSPENSION/ DROPS AURICULAR (OTIC) 4 TIMES DAILY
Qty: 10 ML | Refills: 0 | Status: SHIPPED | OUTPATIENT
Start: 2024-01-04 | End: 2024-01-14

## 2024-01-04 RX ORDER — AMOXICILLIN 500 MG/1
500 CAPSULE ORAL EVERY 8 HOURS SCHEDULED
Qty: 30 CAPSULE | Refills: 0 | Status: SHIPPED | OUTPATIENT
Start: 2024-01-04 | End: 2024-01-14

## 2024-01-04 RX ORDER — CALCIUM CARBONATE 300MG(750)
1 TABLET,CHEWABLE ORAL DAILY
Qty: 90 TABLET | Refills: 1 | Status: SHIPPED | OUTPATIENT
Start: 2024-01-04 | End: 2024-05-16 | Stop reason: SDUPTHER

## 2024-01-04 ASSESSMENT — PATIENT HEALTH QUESTIONNAIRE - PHQ9
2. FEELING DOWN, DEPRESSED OR HOPELESS: NOT AT ALL
1. LITTLE INTEREST OR PLEASURE IN DOING THINGS: NOT AT ALL
SUM OF ALL RESPONSES TO PHQ9 QUESTIONS 1 AND 2: 0

## 2024-01-04 ASSESSMENT — ENCOUNTER SYMPTOMS: DEPRESSION: 0

## 2024-01-04 NOTE — PATIENT INSTRUCTIONS
I will have you continue the same medication for your blood pressure.  I will order antibiotic pills and ear drops for your ear pain.  Return in one month for a recheck. I recommend that you see your dermatologist again since it has been over a year and you have numerous growths on your left foot.  You state you will call and make an appointment.

## 2024-01-04 NOTE — PROGRESS NOTES
Subjective   Patient ID: [id number]     Edu Domingo is a 73 y.o. female who presents for Follow-up (2 week.) and Earache (Bilateral).  HPI  She is here for a recheck on hypertension.  Her blood pressure was severely elevated last vist (240s/90s).  It was rechecked at 195/85.  She was instructed to continue her carvedilol, take her lisinopril (which was missed that day) and started on amlodipine.  Her blood pressure is improved today at 130/80.  She is taking all three medications without problems.  She had one episode of dizziness last night that lasted two seconds.  Otherwise no dizziness.  No falls.      She has a history of lung cancer, adnocarcinoma, that was treated surgically years ago.  She has COPD and is on oxygen at night, which was renewed at her last visit.  She follows cardiology for NSVT.  She has CKD 3. Her creatinine was 1.27 in October 2023.   She remains on venlafaxine for depression.      She denies any chest pain.     She has ear pain, nasal congestion and tearing from her eyes.    She has a bad runny nose.  She has been taking allergy pills.  This was going on when she was here last time but not as bad.   Objective     /80 (BP Location: Left arm, Patient Position: Sitting)   Temp 36.3 °C (97.4 °F) (Skin)   Wt 61.2 kg (135 lb)   LMP  (LMP Unknown)   BMI 22.47 kg/m²      Physical Exam  Constitutional:       Appearance: Normal appearance.   HENT:      Ears:      Comments: Ears tender EACs.       Nose: Rhinorrhea present.      Mouth/Throat:      Pharynx: No oropharyngeal exudate or posterior oropharyngeal erythema.   Cardiovascular:      Rate and Rhythm: Normal rate and regular rhythm.      Heart sounds: Normal heart sounds.   Pulmonary:      Effort: Pulmonary effort is normal.      Breath sounds: Normal breath sounds.   Neurological:      Mental Status: She is alert.         Assessment/Plan   Problem List Items Addressed This Visit       Adenocarcinoma of lung (CMS/HCC)    CAD S/P  percutaneous coronary angioplasty    Relevant Medications    magnesium oxide (Mag-Ox) 400 mg tablet    Depression, major, single episode, mild (CMS/HCC)    Acute otitis externa    Relevant Medications    amoxicillin (Amoxil) 500 mg capsule    neomycin-polymyxin-HC (Cortisporin) 3.5-10,000-1 mg/mL-unit/mL-% otic suspension    Non-sustained ventricular tachycardia (CMS/HCC)    Nocturnal hypoxemia due to emphysema (CMS/HCC) - Primary    Stage 3a chronic kidney disease (CMS/HCC)   I will have you continue the same medication for your blood pressure.  I will order antibiotic pills and ear drops for your ear pain.  Return in one month for a recheck. I recommend that you see your dermatologist again since it has been over a year and you have numerous growths on your left foot.  You state you will call and make an appointment.    Abram Salmeron, DO

## 2024-01-11 ENCOUNTER — APPOINTMENT (OUTPATIENT)
Dept: PRIMARY CARE | Facility: CLINIC | Age: 74
End: 2024-01-11
Payer: MEDICARE

## 2024-01-25 ENCOUNTER — OFFICE VISIT (OUTPATIENT)
Dept: CARDIOLOGY | Facility: CLINIC | Age: 74
End: 2024-01-25
Payer: MEDICARE

## 2024-01-25 ENCOUNTER — APPOINTMENT (OUTPATIENT)
Dept: CARDIOLOGY | Facility: CLINIC | Age: 74
End: 2024-01-25
Payer: MEDICARE

## 2024-01-25 VITALS
DIASTOLIC BLOOD PRESSURE: 80 MMHG | HEART RATE: 66 BPM | SYSTOLIC BLOOD PRESSURE: 132 MMHG | BODY MASS INDEX: 22.47 KG/M2 | HEIGHT: 65 IN

## 2024-01-25 DIAGNOSIS — R42 DIZZINESS: ICD-10-CM

## 2024-01-25 DIAGNOSIS — E78.2 MIXED HYPERLIPIDEMIA: ICD-10-CM

## 2024-01-25 DIAGNOSIS — R60.9 PERIPHERAL EDEMA: ICD-10-CM

## 2024-01-25 DIAGNOSIS — I10 ESSENTIAL HYPERTENSION: ICD-10-CM

## 2024-01-25 DIAGNOSIS — I47.29 NON-SUSTAINED VENTRICULAR TACHYCARDIA (MULTI): ICD-10-CM

## 2024-01-25 DIAGNOSIS — F17.200 NICOTINE USE DISORDER: ICD-10-CM

## 2024-01-25 DIAGNOSIS — I25.10 CAD S/P PERCUTANEOUS CORONARY ANGIOPLASTY: ICD-10-CM

## 2024-01-25 DIAGNOSIS — F41.9 ANXIETY: ICD-10-CM

## 2024-01-25 DIAGNOSIS — R00.2 PALPITATIONS: ICD-10-CM

## 2024-01-25 DIAGNOSIS — R09.89 BILATERAL CAROTID BRUITS: ICD-10-CM

## 2024-01-25 DIAGNOSIS — I21.3 ST ELEVATION MYOCARDIAL INFARCTION (STEMI), UNSPECIFIED ARTERY (MULTI): ICD-10-CM

## 2024-01-25 DIAGNOSIS — Z98.61 CAD S/P PERCUTANEOUS CORONARY ANGIOPLASTY: ICD-10-CM

## 2024-01-25 DIAGNOSIS — F32.0 DEPRESSION, MAJOR, SINGLE EPISODE, MILD (CMS-HCC): ICD-10-CM

## 2024-01-25 DIAGNOSIS — F32.A DEPRESSION, UNSPECIFIED DEPRESSION TYPE: ICD-10-CM

## 2024-01-25 DIAGNOSIS — R94.31 ABNORMAL EKG: Primary | ICD-10-CM

## 2024-01-25 DIAGNOSIS — I65.21 STENOSIS OF RIGHT CAROTID ARTERY: ICD-10-CM

## 2024-01-25 DIAGNOSIS — G45.8 SUBCLAVIAN STEAL SYNDROME: ICD-10-CM

## 2024-01-25 DIAGNOSIS — R09.89 BRUIT OF LEFT CAROTID ARTERY: ICD-10-CM

## 2024-01-25 DIAGNOSIS — I25.5 CARDIOMYOPATHY, ISCHEMIC: ICD-10-CM

## 2024-01-25 DIAGNOSIS — R07.9 CHEST PAIN, UNSPECIFIED TYPE: ICD-10-CM

## 2024-01-25 DIAGNOSIS — R55 SYNCOPE, UNSPECIFIED SYNCOPE TYPE: ICD-10-CM

## 2024-01-25 PROCEDURE — 1159F MED LIST DOCD IN RCRD: CPT | Performed by: INTERNAL MEDICINE

## 2024-01-25 PROCEDURE — 3075F SYST BP GE 130 - 139MM HG: CPT | Performed by: INTERNAL MEDICINE

## 2024-01-25 PROCEDURE — 3079F DIAST BP 80-89 MM HG: CPT | Performed by: INTERNAL MEDICINE

## 2024-01-25 PROCEDURE — 3008F BODY MASS INDEX DOCD: CPT | Performed by: INTERNAL MEDICINE

## 2024-01-25 PROCEDURE — 93000 ELECTROCARDIOGRAM COMPLETE: CPT | Performed by: INTERNAL MEDICINE

## 2024-01-25 PROCEDURE — 1126F AMNT PAIN NOTED NONE PRSNT: CPT | Performed by: INTERNAL MEDICINE

## 2024-01-25 PROCEDURE — 99214 OFFICE O/P EST MOD 30 MIN: CPT | Performed by: INTERNAL MEDICINE

## 2024-01-25 PROCEDURE — 1160F RVW MEDS BY RX/DR IN RCRD: CPT | Performed by: INTERNAL MEDICINE

## 2024-01-25 RX ORDER — CITALOPRAM 40 MG/1
40 TABLET, FILM COATED ORAL DAILY
Qty: 90 TABLET | Refills: 1 | Status: SHIPPED | OUTPATIENT
Start: 2024-01-25

## 2024-01-25 RX ORDER — FUROSEMIDE 20 MG/1
20 TABLET ORAL DAILY
Qty: 30 TABLET | Refills: 0 | Status: SHIPPED | OUTPATIENT
Start: 2024-01-25 | End: 2024-03-08 | Stop reason: SDUPTHER

## 2024-01-25 ASSESSMENT — PAIN SCALES - GENERAL: PAINLEVEL: 0-NO PAIN

## 2024-01-25 NOTE — PATIENT INSTRUCTIONS
Exercise diet weight loss program.    Hydrate    Discontinue smoking.    Use My Chart portal for reviewing records, testing and contacting office.

## 2024-01-25 NOTE — TELEPHONE ENCOUNTER
Refill Lasix 20mg 1qd  LR: 11/14/23    Citalopram 40mg 1qd  LR: 07/06/23    [facility]     LV: 01/04/24  NV: 02/01/24

## 2024-01-25 NOTE — PROGRESS NOTES
CARDIOLOGY OFFICE VISIT NOTE       Patient:    Edu Domingo    YOB: 1950  MRN:    [id number]    Date:   1/25/2024    Primary Physician: Abram Salmeron DO      Reason for Visit: To establish ongoing cardiovascular care, first visit     1 year post cardiology follow-up.     IMPRESSION:      Dyspnea on exertion  Palpitations  Lower extremity edema  Lightheadedness  Coronary artery disease history of RCA PCI, moderate known left circumflex disease, medical treatment  Ischemic cardiomyopathy, resolved  LVEF 55% by echocardiogram 11/2022 with inferior lateral hypokinesia  Nonsustained ventricular tachycardia  COPD  History of prior TIA  Prior lobectomy 2014  Depression  Degenerative joint disease  Anxiety  Hypertension  Hyperlipidemia  Ongoing tobacco abuse  Family history of coronary artery disease  Otherwise as per assessment below.      RECOMMENDATIONS:      Patient has borderline finding.  Would suggest continuing current medications.  Patient [place] going south for [place] winter.  Will return in August.  Will plan to see back at that time for further evaluation.    Have suggested that she discontinue smoking.  Exercise dietary program was encouraged.  Hydration.    Follow-up with Dr.Quan virk as prior scheduled.    [employer] portal use was encouraged.    We will plan to see back in August 2024 with Laboratory Studies and ECG as noted below.     Patient will follow up with their primary physician for general care.    The patient knows to contact medical care earlier if need be.      HPI:     Edu Domingo was seen in cardiac evaluation at the  Cardiology office January 25, 2024.      The patients problems are listed as in the impression above.    Electronic medical records reviewed.    Patient is a pleasant 73-year-old hypertensive, hyperlipidemic woman with known coronary artery disease post previous angioplasty and microinfarction with resultant ischemic cardiomyopathy who MUGA returns  now for establishment of cardiovascular care and 1 month follow-up post previous cardiac evaluation with Dr. Nelson.  This is my first visit with the patient.    Patient also has a history of nonsustained ventricular tachycardia and follows with Dr. Randall electrophysiology.  She saw her last October 2023.  No changes were suggested.    Overall she feels well but does have some chronic dyspnea on exertion and some lower extremity edema.  She does have occasional palpitations and some lightheadedness.  These occur for seconds only.  She has no new complaints otherwise.    Patient denies Chest Pain, TIA or CVA symptoms.  No CHF.  No GI,  or Bleeding Issues. No Recent Fever or Chills.     Cardiovascular and general review of systems is otherwise negative.    A 14-system review is otherwise negative, other than noted.    ALLERGIES:     Allergies   Allergen Reactions    Morphine Unknown    Myrbetriq [Mirabegron] Other     Elevated blood pressure.    Oxycodone-Acetaminophen Unknown    Sulfa (Sulfonamide Antibiotics) Unknown      MEDICATIONS:     Current Outpatient Medications   Medication Instructions    acetaminophen (TYLENOL ORAL) 1 tablet, oral, Daily PRN    albuterol 90 mcg/actuation inhaler INHALE 2 PUFFS BY MOUTH AS INSTRUCTED EVERY 4 HOURS AS NEEDED FOR WHEEZING / SHORTNESS OF BREATH    ALPRAZolam (XANAX) 0.5 mg, oral, 3 times daily PRN    amLODIPine (NORVASC) 2.5 mg, oral, Daily    Anoro Ellipta 62.5-25 mcg/actuation blister with device INHALE 1 PUFF BY MOUTH AS INSTRUCTED ONCE DAILY.    aspirin 81 mg EC tablet 1 tablet, oral, Daily    atorvastatin (LIPITOR) 80 mg, oral, Daily    carvedilol (COREG) 25 mg, oral, 2 times daily with meals    celecoxib (CELEBREX) 200 mg, oral, Daily PRN    cetirizine HCl (ZYRTEC ORAL) 1 capsule, oral, Daily PRN    cholecalciferol (Vitamin D-3) 50 MCG (2000 UT) tablet oral    citalopram (CELEXA) 40 mg, oral, Daily    fluorouracil (Efudex) 5 % cream 1 Application    furosemide (LASIX)  20 mg, oral, Daily    Gemtesa 75 mg tablet 1 tablet, oral, Daily    lisinopril 20 mg, oral, Daily    magnesium oxide (MAG-OX) 400 mg, oral, Daily    nicotine (Nicoderm CQ) 21 mg/24 hr patch 1 patch, transdermal, Every 24 hours    nitroglycerin (NITROSTAT) 0.4 mg, sublingual, Every 5 min PRN, Times three PRN    omeprazole (PRILOSEC) 40 mg, oral, Daily before breakfast    oxygen (O2) gas therapy at bedtime    solifenacin (VESICARE) 10 mg, oral, Daily    venlafaxine XR (EFFEXOR-XR) 37.5 mg, oral, Daily RT, Take with food     PAST MEDICAL HISTORY:   As per impression above.  No other significant past medical or surgical history.    SOCIAL HISTORY:   .  .  Smokes 5 cigarettes/day.  No alcohol or illicit drug use.    FAMILY HISTORY:   Family history positive for CAD.    ELECTROCARDIOGRAM:    Normal sinus rhythm, nonspecific ST-T wave changes.  Rate 64.    CARDIAC TESTING:    None    LABORATORY DATA:        CBC:   Lab Results   Component Value Date    WBC 7.2 10/10/2023    RBC 4.68 10/10/2023    HGB 12.4 10/10/2023    HCT 40.0 10/10/2023     10/10/2023        CMP:    Lab Results   Component Value Date     10/17/2023    K 4.2 10/17/2023     10/17/2023    CO2 32 10/17/2023    BUN 18 10/17/2023    CREATININE 1.27 (H) 10/17/2023    GLUCOSE 88 10/17/2023    CALCIUM 9.6 10/17/2023       Magnesium:    Lab Results   Component Value Date    MG 2.00 05/26/2023       Lipid Profile:    Lab Results   Component Value Date    TRIG 166 (H) 09/29/2022    HDL 57.0 09/29/2022       Hepatic Function Panel:    Lab Results   Component Value Date    ALKPHOS 98 10/10/2023    ALT 21 10/10/2023    AST 19 10/10/2023    PROT 7.3 10/10/2023    BILITOT 0.5 10/10/2023       TSH:    Lab Results   Component Value Date    TSH 0.99 05/22/2023       HgBA1c:    Lab Results   Component Value Date    HGBA1C 5.7 12/22/2020       BNP:   Lab Results   Component Value Date    BNP 1,066 (H) 12/05/2021        PT/INR:    Lab  Results   Component Value Date    PROTIME 11.5 05/23/2023    INR 1.0 05/23/2023       Cardiac Enzymes:    Lab Results   Component Value Date    TROPHS 8 11/26/2022    TROPHS 6 11/26/2022    TROPHS 7 11/26/2022       VITALS:     There were no vitals filed for this visit.    Wt Readings from Last 4 Encounters:   01/04/24 61.2 kg (135 lb)   12/18/23 62.1 kg (137 lb)   12/12/23 60.1 kg (132 lb 7.9 oz)   12/06/23 59 kg (130 lb)       PHYSICAL EXAMINATION:      General: No acute distress. Vital signs as noted. Alert and oriented.  Head And Neck Examination: No jugular venous distention, no carotid bruits, no mass. Carotid upstrokes preserved. Oral mucosa moist.  No xanthelasma. Head and neck examination otherwise unremarkable.  Lungs: Clear to auscultation and percussion. No wheezes, no rales,  and no rhonchi.  Chest: Excursion appeared to be normal. No chest wall tenderness on palpation.  Heart: Normal S1 and S2. No S3. No S4. No rub. Grade 1/6 systolic murmur, best heard at the left sternal border. Point of maximal impulse was within normal limits.  Abdomen: Soft. Nontender. No organomegaly. No bruits. No masses.   Extremities: No bipedal edema. No clubbing. No cyanosis.  Pulses are strong throughout. No bruits.  Musculoskeletal Exam: No ulcers, otherwise unremarkable.  Neuro: Neurologically appeared grossly intact.          PROBLEM LIST:     Patient Active Problem List   Diagnosis    Abdominal pain    Abnormal EKG    Adenocarcinoma of lung (CMS/HCC)    Antibiotic-associated diarrhea    Anxiety    Atypical chest pain    Back skin lesion    Benign skin cyst    Bilateral carotid bruits    Blepharitis    Bright red rectal bleeding    Bruit of left carotid artery    CAD S/P percutaneous coronary angioplasty    Cellulitis of left axilla    Cellulitis of right axilla    Cellulitis of right ear    Cellulitis, lip    Chest pain    Colitis    Depression, major, single episode, mild (CMS/HCC)    Dizziness    Essential  hypertension    Excessive ear wax, bilateral    External hemorrhoids    GERD (gastroesophageal reflux disease)    Hand injury, right, initial encounter    Cephalgia    Headache    Chronic hip pain    Hip pain, left    Hip pain, right    Hives    Hyperlipidemia    Hypertension    Left lumbar radiculitis    Menopausal state    Multiple dysplastic nevi    Near syncope    Nocturia    Nicotine use disorder    Arthritis, degenerative    Osteoarthritis    Overactive bladder    Pain due to onychomycosis of nail    Palpitations    Pneumonia    Post-traumatic osteoarthritis of both ankles    Primary nocturnal enuresis    Radial nerve compression    Rectal bleed    Skin lesion of lower extremity    Squamous cell skin cancer    STEMI (ST elevation myocardial infarction) (CMS/HCC)    Carotid artery stenosis    Stenosis of right carotid artery    Stye    Subcutaneous mass    Syncope    Urge incontinence of urine    Urinary incontinence    Urinary urgency    UTI (urinary tract infection)    Vulvar intraepithelial neoplasia (DAMIEN) grade 3    Nephrolithiasis    Sprain of right hand    Skin lesion of face    Acute URI    Right otitis externa    Pharyngitis, acute    Acute sinusitis    Acute otitis media    Acute otitis externa    Acute bronchitis    Cellulitis of face    Non-sustained ventricular tachycardia (CMS/HCC)    Actinic keratosis    Other seborrheic keratosis    Chronic cough    Coronary artery disease with unstable angina pectoris (CMS/HCC)    Edema of both lower extremities    General weakness    Hematuria    Hypertensive renovascular disease    Injury of radial nerve    Neoplasm of uncertain behavior of skin    Osteoarthritis of carpometacarpal (CMC) joint of thumb    Other melanin hyperpigmentation    Personal history of other malignant neoplasm of skin    Pyelonephritis    Sleep attack    Subclavian steal syndrome    Greater trochanteric pain syndrome    Benign neoplasm of soft tissues of upper limb    Hemangioma of skin  and subcutaneous tissue    Abscess of face    Squamous cell carcinoma of skin of lower limb, including hip    BMI 22.0-22.9, adult    Nuclear sclerotic cataract of both eyes    Vulvar dysplasia    Nocturnal hypoxemia due to emphysema (CMS/HCC)    Stage 3a chronic kidney disease (CMS/HCC)             Christophe Wood MD, [facility] /  Cardiology      Of Note:  [employer] voice recognition dictation software was utilized partially in the preparation of this note, therefore, inaccuracies in spelling, word choice and punctuation may have occurred which were not recognized the time of signing.    Patient was seen and examined with total time of visit including chart preparation, rooming, and chart completion exceeding 40 minutes.    ----

## 2024-02-01 ENCOUNTER — OFFICE VISIT (OUTPATIENT)
Dept: PRIMARY CARE | Facility: CLINIC | Age: 74
End: 2024-02-01
Payer: MEDICARE

## 2024-02-01 VITALS
BODY MASS INDEX: 22.41 KG/M2 | WEIGHT: 134.48 LBS | SYSTOLIC BLOOD PRESSURE: 172 MMHG | DIASTOLIC BLOOD PRESSURE: 80 MMHG | TEMPERATURE: 97.2 F | HEIGHT: 65 IN

## 2024-02-01 DIAGNOSIS — Z78.0 ENCOUNTER FOR OSTEOPOROSIS SCREENING IN ASYMPTOMATIC POSTMENOPAUSAL PATIENT: ICD-10-CM

## 2024-02-01 DIAGNOSIS — F32.0 DEPRESSION, MAJOR, SINGLE EPISODE, MILD (CMS-HCC): ICD-10-CM

## 2024-02-01 DIAGNOSIS — I10 PRIMARY HYPERTENSION: ICD-10-CM

## 2024-02-01 DIAGNOSIS — J42 CHRONIC BRONCHITIS, UNSPECIFIED CHRONIC BRONCHITIS TYPE (MULTI): ICD-10-CM

## 2024-02-01 DIAGNOSIS — N95.1 MENOPAUSAL STATE: ICD-10-CM

## 2024-02-01 DIAGNOSIS — Z00.00 ROUTINE GENERAL MEDICAL EXAMINATION AT HEALTH CARE FACILITY: Primary | ICD-10-CM

## 2024-02-01 DIAGNOSIS — Z13.820 ENCOUNTER FOR OSTEOPOROSIS SCREENING IN ASYMPTOMATIC POSTMENOPAUSAL PATIENT: ICD-10-CM

## 2024-02-01 DIAGNOSIS — Z12.31 SCREENING MAMMOGRAM FOR BREAST CANCER: ICD-10-CM

## 2024-02-01 LAB
ALBUMIN SERPL BCP-MCNC: 4.1 G/DL (ref 3.4–5)
ALP SERPL-CCNC: 95 U/L (ref 33–136)
ALT SERPL W P-5'-P-CCNC: 23 U/L (ref 7–45)
ANION GAP SERPL CALC-SCNC: 11 MMOL/L (ref 10–20)
AST SERPL W P-5'-P-CCNC: 22 U/L (ref 9–39)
BASOPHILS # BLD AUTO: 0.1 X10*3/UL (ref 0–0.1)
BASOPHILS NFR BLD AUTO: 1.3 %
BILIRUB SERPL-MCNC: 0.6 MG/DL (ref 0–1.2)
BUN SERPL-MCNC: 17 MG/DL (ref 6–23)
CALCIUM SERPL-MCNC: 9.3 MG/DL (ref 8.6–10.3)
CHLORIDE SERPL-SCNC: 101 MMOL/L (ref 98–107)
CHOLEST SERPL-MCNC: 140 MG/DL (ref 0–199)
CHOLESTEROL/HDL RATIO: 2.1
CO2 SERPL-SCNC: 31 MMOL/L (ref 21–32)
CREAT SERPL-MCNC: 1.24 MG/DL (ref 0.5–1.05)
EGFRCR SERPLBLD CKD-EPI 2021: 47 ML/MIN/1.73M*2
EOSINOPHIL # BLD AUTO: 0.31 X10*3/UL (ref 0–0.4)
EOSINOPHIL NFR BLD AUTO: 4.2 %
ERYTHROCYTE [DISTWIDTH] IN BLOOD BY AUTOMATED COUNT: 15.2 % (ref 11.5–14.5)
GLUCOSE SERPL-MCNC: 84 MG/DL (ref 74–99)
HCT VFR BLD AUTO: 38.5 % (ref 36–46)
HDLC SERPL-MCNC: 67.8 MG/DL
HGB BLD-MCNC: 12.3 G/DL (ref 12–16)
IMM GRANULOCYTES # BLD AUTO: 0.01 X10*3/UL (ref 0–0.5)
IMM GRANULOCYTES NFR BLD AUTO: 0.1 % (ref 0–0.9)
LDLC SERPL CALC-MCNC: 60 MG/DL
LYMPHOCYTES # BLD AUTO: 2.82 X10*3/UL (ref 0.8–3)
LYMPHOCYTES NFR BLD AUTO: 38 %
MCH RBC QN AUTO: 27.5 PG (ref 26–34)
MCHC RBC AUTO-ENTMCNC: 31.9 G/DL (ref 32–36)
MCV RBC AUTO: 86 FL (ref 80–100)
MONOCYTES # BLD AUTO: 0.63 X10*3/UL (ref 0.05–0.8)
MONOCYTES NFR BLD AUTO: 8.5 %
NEUTROPHILS # BLD AUTO: 3.56 X10*3/UL (ref 1.6–5.5)
NEUTROPHILS NFR BLD AUTO: 47.9 %
NON HDL CHOLESTEROL: 72 MG/DL (ref 0–149)
NRBC BLD-RTO: 0 /100 WBCS (ref 0–0)
PLATELET # BLD AUTO: 219 X10*3/UL (ref 150–450)
POTASSIUM SERPL-SCNC: 4.5 MMOL/L (ref 3.5–5.3)
PROT SERPL-MCNC: 6.5 G/DL (ref 6.4–8.2)
RBC # BLD AUTO: 4.48 X10*6/UL (ref 4–5.2)
SODIUM SERPL-SCNC: 138 MMOL/L (ref 136–145)
TRIGL SERPL-MCNC: 62 MG/DL (ref 0–149)
TSH SERPL-ACNC: 2.07 MIU/L (ref 0.44–3.98)
VLDL: 12 MG/DL (ref 0–40)
WBC # BLD AUTO: 7.4 X10*3/UL (ref 4.4–11.3)

## 2024-02-01 PROCEDURE — 1170F FXNL STATUS ASSESSED: CPT | Performed by: FAMILY MEDICINE

## 2024-02-01 PROCEDURE — 85025 COMPLETE CBC W/AUTO DIFF WBC: CPT

## 2024-02-01 PROCEDURE — 1126F AMNT PAIN NOTED NONE PRSNT: CPT | Performed by: FAMILY MEDICINE

## 2024-02-01 PROCEDURE — 1159F MED LIST DOCD IN RCRD: CPT | Performed by: FAMILY MEDICINE

## 2024-02-01 PROCEDURE — 80053 COMPREHEN METABOLIC PANEL: CPT

## 2024-02-01 PROCEDURE — 36415 COLL VENOUS BLD VENIPUNCTURE: CPT

## 2024-02-01 PROCEDURE — 83036 HEMOGLOBIN GLYCOSYLATED A1C: CPT

## 2024-02-01 PROCEDURE — 84443 ASSAY THYROID STIM HORMONE: CPT

## 2024-02-01 PROCEDURE — 1160F RVW MEDS BY RX/DR IN RCRD: CPT | Performed by: FAMILY MEDICINE

## 2024-02-01 PROCEDURE — 3077F SYST BP >= 140 MM HG: CPT | Performed by: FAMILY MEDICINE

## 2024-02-01 PROCEDURE — G0439 PPPS, SUBSEQ VISIT: HCPCS | Performed by: FAMILY MEDICINE

## 2024-02-01 PROCEDURE — 80061 LIPID PANEL: CPT

## 2024-02-01 PROCEDURE — 3008F BODY MASS INDEX DOCD: CPT | Performed by: FAMILY MEDICINE

## 2024-02-01 PROCEDURE — 3079F DIAST BP 80-89 MM HG: CPT | Performed by: FAMILY MEDICINE

## 2024-02-01 RX ORDER — UMECLIDINIUM BROMIDE AND VILANTEROL TRIFENATATE 62.5; 25 UG/1; UG/1
POWDER RESPIRATORY (INHALATION)
Qty: 60 EACH | Refills: 3 | Status: SHIPPED | OUTPATIENT
Start: 2024-02-01 | End: 2024-06-04 | Stop reason: SDUPTHER

## 2024-02-01 RX ORDER — ALPRAZOLAM 0.5 MG/1
0.5 TABLET ORAL 3 TIMES DAILY PRN
Qty: 90 TABLET | Refills: 0 | Status: SHIPPED | OUTPATIENT
Start: 2024-02-01 | End: 2024-04-09 | Stop reason: SDUPTHER

## 2024-02-01 RX ORDER — AMLODIPINE BESYLATE 5 MG/1
5 TABLET ORAL DAILY
Qty: 30 TABLET | Refills: 2 | Status: SHIPPED | OUTPATIENT
Start: 2024-02-01 | End: 2024-05-06 | Stop reason: SDUPTHER

## 2024-02-01 RX ORDER — ALBUTEROL SULFATE 90 UG/1
AEROSOL, METERED RESPIRATORY (INHALATION)
Qty: 18 G | Refills: 0 | Status: SHIPPED | OUTPATIENT
Start: 2024-02-01

## 2024-02-01 ASSESSMENT — ACTIVITIES OF DAILY LIVING (ADL)
DRESSING: INDEPENDENT
TAKING_MEDICATION: INDEPENDENT
GROCERY_SHOPPING: INDEPENDENT
DOING_HOUSEWORK: INDEPENDENT
MANAGING_FINANCES: INDEPENDENT
BATHING: INDEPENDENT

## 2024-02-01 ASSESSMENT — ENCOUNTER SYMPTOMS
DIZZINESS: 0
HEADACHES: 0
DIARRHEA: 1
WEAKNESS: 0
MYALGIAS: 1
OCCASIONAL FEELINGS OF UNSTEADINESS: 0
FREQUENCY: 1
SORE THROAT: 0
RHINORRHEA: 0
NAUSEA: 0
HEMATURIA: 0
ABDOMINAL PAIN: 1
CONSTIPATION: 0
NUMBNESS: 0
PALPITATIONS: 0
VOICE CHANGE: 0
FEVER: 0
TROUBLE SWALLOWING: 0
BACK PAIN: 1
WOUND: 0
LOSS OF SENSATION IN FEET: 1
FATIGUE: 0
SHORTNESS OF BREATH: 0
COUGH: 0
WHEEZING: 1
SLEEP DISTURBANCE: 0
ADENOPATHY: 0
DEPRESSION: 1
BLOOD IN STOOL: 0
ARTHRALGIAS: 1
NERVOUS/ANXIOUS: 0
SINUS PRESSURE: 0
DYSPHORIC MOOD: 0
VOMITING: 0
DYSURIA: 0

## 2024-02-01 NOTE — PATIENT INSTRUCTIONS
Smoking is a common cause of heart attacks, stroke, many cancers, peripheral vascular disease and respiratory infections such as pneumonia.  It is very important to your good health that you stop smoking.  There are several medications such as Chantix, Nicoderm and Wellbutrin that can help you stop smoking.  Let me know if you would like to try one of these medications.  Smoking cessation can also save you a lot of money in the cost of buying cigarettes.  I will order labs.  I increased your amlodipine dose.  You have had left lower abdominal pain.  I will order labs to evaluate that.  Return in one month for a recheck if the pain persists.  I ordered a mammogram and a bone density scan.

## 2024-02-01 NOTE — PROGRESS NOTES
Subjective   Reason for Visit: Edu Domingo is an 73 y.o. female here for a Medicare Wellness visit.      Has advanced directives.  Full code.  POA would be  then kids    Reviewed all medications by prescribing practitioner or clinical pharmacist (such as prescriptions, OTCs, herbal therapies and supplements) and documented in the medical record.    HPI  She has been treated for high blood pressure and it has been poorly controlled lately.    Over the weekend, she had a bad headache.      Has had LLQ pain for a few months but not all the time.  Had a transvag US that was negative.  Patient Care Team:  Abram Salmeron DO as PCP - General  Abram Salmeron DO as PCP - [employer] PCP  Christophe Wood MD as Cardiologist (Cardiology)   Current Outpatient Medications on File Prior to Visit   Medication Sig Dispense Refill    acetaminophen (TYLENOL ORAL) Take 1 tablet by mouth once daily as needed.      amLODIPine (Norvasc) 2.5 mg tablet Take 1 tablet (2.5 mg) by mouth once daily. 30 tablet 1    aspirin 81 mg EC tablet Take 1 tablet (81 mg) by mouth once daily.      atorvastatin (Lipitor) 80 mg tablet Take 1 tablet (80 mg) by mouth once daily. 90 tablet 1    carvedilol (Coreg) 25 mg tablet Take 1 tablet (25 mg) by mouth 2 times a day with meals. 180 tablet 1    celecoxib (CeleBREX) 200 mg capsule Take 1 capsule (200 mg) by mouth once daily as needed for moderate pain (4 - 6). 90 capsule 1    cetirizine HCl (ZYRTEC ORAL) Take 1 capsule by mouth once daily as needed.      cholecalciferol (Vitamin D-3) 50 MCG (2000 UT) tablet Take by mouth.      citalopram (CeleXA) 40 mg tablet Take 1 tablet (40 mg) by mouth once daily. 90 tablet 1    furosemide (Lasix) 20 mg tablet Take 1 tablet (20 mg) by mouth once daily. 30 tablet 0    Gemtesa 75 mg tablet Take 1 tablet (75 mg) by mouth once daily.      lisinopril 20 mg tablet Take 1 tablet (20 mg) by mouth once daily. 30 tablet 1    magnesium oxide  (Mag-Ox) 400 mg tablet Take 1 tablet (400 mg) by mouth once daily. 90 tablet 1    nitroglycerin (Nitrostat) 0.4 mg SL tablet Place 1 tablet (0.4 mg) under the tongue every 5 minutes if needed (angina). Times three PRN 90 tablet 0    oxygen (O2) gas therapy at bedtime      venlafaxine XR (Effexor-XR) 37.5 mg 24 hr capsule Take 1 capsule (37.5 mg) by mouth once daily. Take with food 90 capsule 1    [DISCONTINUED] albuterol 90 mcg/actuation inhaler INHALE 2 PUFFS BY MOUTH AS INSTRUCTED EVERY 4 HOURS AS NEEDED FOR WHEEZING / SHORTNESS OF BREATH      [DISCONTINUED] ALPRAZolam (Xanax) 0.5 mg tablet Take 1 tablet (0.5 mg) by mouth 3 times a day as needed for anxiety. 90 tablet 0    [DISCONTINUED] Anoro Ellipta 62.5-25 mcg/actuation blister with device INHALE 1 PUFF BY MOUTH AS INSTRUCTED ONCE DAILY.      [DISCONTINUED] fluorouracil (Efudex) 5 % cream 1 Application      [DISCONTINUED] nicotine (Nicoderm CQ) 21 mg/24 hr patch Place 1 patch on the skin once every 24 hours. 30 patch 2    [DISCONTINUED] omeprazole (PriLOSEC) 40 mg DR capsule Take 1 capsule (40 mg) by mouth once daily in the morning. Take before meals. 90 capsule 1    [DISCONTINUED] solifenacin (VESIcare) 10 mg tablet Take 1 tablet (10 mg) by mouth once daily. 90 tablet 1     No current facility-administered medications on file prior to visit.     Tobacco Use: High Risk (2/1/2024)    Patient History     Smoking Tobacco Use: Every Day     Smokeless Tobacco Use: Never     Passive Exposure: Not on file     No alcohol.  No drug use.      Review of Systems   Constitutional:  Negative for fatigue and fever.   HENT:  Negative for rhinorrhea, sinus pressure, sore throat, trouble swallowing and voice change.    Respiratory:  Positive for wheezing. Negative for cough and shortness of breath.    Cardiovascular:  Negative for chest pain, palpitations and leg swelling.   Gastrointestinal:  Positive for abdominal pain and diarrhea. Negative for blood in stool, constipation,  "nausea and vomiting.   Genitourinary:  Positive for frequency. Negative for dysuria, hematuria and vaginal bleeding.        Saw gynecology for DAMIEN.  Ultrasound was recently normal.   Musculoskeletal:  Positive for arthralgias, back pain and myalgias.   Skin:  Negative for rash and wound.        Follows derm in March.  SCC.     Neurological:  Negative for dizziness, syncope, weakness, numbness and headaches.   Hematological:  Negative for adenopathy.   Psychiatric/Behavioral:  Negative for dysphoric mood (depression is well controlled.), self-injury, sleep disturbance and suicidal ideas. The patient is not nervous/anxious.        Objective   Vitals:  /80 (BP Location: Right arm, Patient Position: Sitting)   Temp 36.2 °C (97.2 °F) (Skin)   Ht 1.651 m (5' 5\")   Wt 61 kg (134 lb 7.7 oz)   LMP  (LMP Unknown)   BMI 22.38 kg/m²       Physical Exam  Vitals reviewed.   Constitutional:       General: She is not in acute distress.     Appearance: Normal appearance. She is not ill-appearing or toxic-appearing.   HENT:      Head: Normocephalic and atraumatic.      Right Ear: Tympanic membrane, ear canal and external ear normal.      Left Ear: Tympanic membrane, ear canal and external ear normal.      Nose: Nose normal.      Mouth/Throat:      Mouth: Mucous membranes are moist.   Eyes:      Extraocular Movements: Extraocular movements intact.      Conjunctiva/sclera: Conjunctivae normal.      Pupils: Pupils are equal, round, and reactive to light.   Cardiovascular:      Rate and Rhythm: Normal rate and regular rhythm.      Heart sounds: No murmur heard.  Pulmonary:      Effort: Pulmonary effort is normal.      Breath sounds: Normal breath sounds.   Abdominal:      General: Bowel sounds are normal. There is no distension.      Palpations: Abdomen is soft. There is no mass.      Tenderness: There is no abdominal tenderness. There is no guarding or rebound.   Musculoskeletal:         General: No tenderness.      Cervical " back: Neck supple.      Right lower leg: No edema.      Left lower leg: No edema.   Skin:     Coloration: Skin is not jaundiced or pale.      Findings: No rash.   Neurological:      General: No focal deficit present.      Mental Status: She is alert and oriented to person, place, and time. Mental status is at baseline.   Psychiatric:         Mood and Affect: Mood normal.         Behavior: Behavior normal.         Thought Content: Thought content normal.         Judgment: Judgment normal.         Assessment/Plan   Problem List Items Addressed This Visit       Depression, major, single episode, mild (CMS/HCC)    Relevant Medications    ALPRAZolam (Xanax) 0.5 mg tablet    Hypertension    Relevant Orders    CBC and Auto Differential    Menopausal state    Relevant Orders    XR DEXA bone density     Other Visit Diagnoses       Routine general medical examination at health care facility    -  Primary    Relevant Orders    Hemoglobin A1C    Comprehensive Metabolic Panel    Lipid Panel    Thyroid Stimulating Hormone    Chronic bronchitis, unspecified chronic bronchitis type (CMS/HCC)        Relevant Medications    albuterol 90 mcg/actuation inhaler    Anoro Ellipta 62.5-25 mcg/actuation blister with device    Screening mammogram for breast cancer        Relevant Orders    BI mammo bilateral screening tomosynthesis    Encounter for osteoporosis screening in asymptomatic postmenopausal patient        Relevant Orders    XR DEXA bone density          Annual Wellness exam completed   Preventive Health history reviewed:  Labs ordered    Mammogram ordered  BMD ordered  Cscope done in 2019.  Told to repeat in seven years.  Low dose CT chest for lung cancer screening not indicated.  Last been diagnosed and treated for lung cancer.  Depression Screening done  Advanced Directives Discussion Completed  Cardiovascular risk discussed and if needed, lifestyle modifications recommended, including nutritional choices, exercise, and  elimination of habits contributing to risk.  We agreed on a plan to reduce the current cardiovascular risk.  See ecalc ASCVD Risk  Plus for data discussed regarding risk and risk reduction opportunities.  Aspirin use is recommended after reviewing the updated guidelines. I increased the dose of your blood pressure medication, amlodipine to 5 mg daily.  Continue with your other medications.    Vaccines:  Influenza done 10/2023  Prevnar 20 done 7/2023  Pneumovax 23 done 9/2021  Shingrix completed 2021.  Recommend RSV shot at pharmacy.   Smoking is a common cause of heart attacks, stroke, many cancers, peripheral vascular disease and respiratory infections such as pneumonia.  It is very important to your good health that you stop smoking.  There are several medications such as Chantix, Nicoderm and Wellbutrin that can help you stop smoking.  Let me know if you would like to try one of these medications.  Smoking cessation can also save you a lot of money in the cost of buying cigarettes.  I will order labs.  I increased your amlodipine dose.  You have had left lower abdominal pain.  I will order labs to evaluate that.  Return in one month for a recheck if the pain persists.  I ordered a mammogram and a bone density scan.

## 2024-02-02 LAB
EST. AVERAGE GLUCOSE BLD GHB EST-MCNC: 126 MG/DL
HBA1C MFR BLD: 6 %

## 2024-03-01 ENCOUNTER — HOSPITAL ENCOUNTER (OUTPATIENT)
Dept: RADIOLOGY | Facility: HOSPITAL | Age: 74
Discharge: HOME | End: 2024-03-01
Payer: MEDICARE

## 2024-03-01 DIAGNOSIS — Z13.820 ENCOUNTER FOR OSTEOPOROSIS SCREENING IN ASYMPTOMATIC POSTMENOPAUSAL PATIENT: ICD-10-CM

## 2024-03-01 DIAGNOSIS — N95.1 MENOPAUSAL STATE: ICD-10-CM

## 2024-03-01 DIAGNOSIS — Z78.0 ENCOUNTER FOR OSTEOPOROSIS SCREENING IN ASYMPTOMATIC POSTMENOPAUSAL PATIENT: ICD-10-CM

## 2024-03-01 PROCEDURE — 77080 DXA BONE DENSITY AXIAL: CPT

## 2024-03-01 PROCEDURE — 77080 DXA BONE DENSITY AXIAL: CPT | Performed by: STUDENT IN AN ORGANIZED HEALTH CARE EDUCATION/TRAINING PROGRAM

## 2024-03-04 ENCOUNTER — OFFICE VISIT (OUTPATIENT)
Dept: PRIMARY CARE | Facility: CLINIC | Age: 74
End: 2024-03-04
Payer: MEDICARE

## 2024-03-04 VITALS
SYSTOLIC BLOOD PRESSURE: 130 MMHG | TEMPERATURE: 97.3 F | WEIGHT: 127.87 LBS | BODY MASS INDEX: 21.28 KG/M2 | DIASTOLIC BLOOD PRESSURE: 70 MMHG

## 2024-03-04 DIAGNOSIS — R39.9 UTI SYMPTOMS: Primary | ICD-10-CM

## 2024-03-04 DIAGNOSIS — I10 PRIMARY HYPERTENSION: ICD-10-CM

## 2024-03-04 LAB
POC BILIRUBIN, URINE: NEGATIVE
POC BLOOD, URINE: NEGATIVE
POC GLUCOSE, URINE: NEGATIVE MG/DL
POC KETONES, URINE: NEGATIVE MG/DL
POC LEUKOCYTES, URINE: ABNORMAL
POC NITRITE,URINE: NEGATIVE
POC PH, URINE: 7 PH
POC PROTEIN, URINE: NEGATIVE MG/DL
POC SPECIFIC GRAVITY, URINE: 1.01
POC UROBILINOGEN, URINE: 0.2 EU/DL

## 2024-03-04 PROCEDURE — 99214 OFFICE O/P EST MOD 30 MIN: CPT | Performed by: FAMILY MEDICINE

## 2024-03-04 PROCEDURE — 1160F RVW MEDS BY RX/DR IN RCRD: CPT | Performed by: FAMILY MEDICINE

## 2024-03-04 PROCEDURE — 1126F AMNT PAIN NOTED NONE PRSNT: CPT | Performed by: FAMILY MEDICINE

## 2024-03-04 PROCEDURE — 3078F DIAST BP <80 MM HG: CPT | Performed by: FAMILY MEDICINE

## 2024-03-04 PROCEDURE — 1159F MED LIST DOCD IN RCRD: CPT | Performed by: FAMILY MEDICINE

## 2024-03-04 PROCEDURE — 3008F BODY MASS INDEX DOCD: CPT | Performed by: FAMILY MEDICINE

## 2024-03-04 PROCEDURE — 3075F SYST BP GE 130 - 139MM HG: CPT | Performed by: FAMILY MEDICINE

## 2024-03-04 PROCEDURE — 81003 URINALYSIS AUTO W/O SCOPE: CPT | Performed by: FAMILY MEDICINE

## 2024-03-04 PROCEDURE — 87086 URINE CULTURE/COLONY COUNT: CPT

## 2024-03-04 RX ORDER — NITROFURANTOIN 25; 75 MG/1; MG/1
100 CAPSULE ORAL 2 TIMES DAILY
Qty: 14 CAPSULE | Refills: 0 | Status: SHIPPED | OUTPATIENT
Start: 2024-03-04 | End: 2024-03-11

## 2024-03-04 RX ORDER — METHYLPREDNISOLONE 4 MG/1
TABLET ORAL
Qty: 21 TABLET | Refills: 0 | Status: SHIPPED | OUTPATIENT
Start: 2024-03-04 | End: 2024-03-11

## 2024-03-04 ASSESSMENT — ENCOUNTER SYMPTOMS: DEPRESSION: 0

## 2024-03-04 ASSESSMENT — PATIENT HEALTH QUESTIONNAIRE - PHQ9
2. FEELING DOWN, DEPRESSED OR HOPELESS: NOT AT ALL
SUM OF ALL RESPONSES TO PHQ9 QUESTIONS 1 AND 2: 0
1. LITTLE INTEREST OR PLEASURE IN DOING THINGS: NOT AT ALL

## 2024-03-04 NOTE — PROGRESS NOTES
"Subjective   Patient ID: [id number]     Edu Domingo is a 73 y.o. female who presents for Follow-up (One month) and UTI Symptoms.  HPI    She is here for a one month recheck.  Her amlodipine was increased one month ago when her BP was 172/80.  Her BP is 130/70 here today. She is on the higher dose of amlodipine.  She denies any side effects to the higher dose of amlodipine.     She has UTI symptoms.  She woke up Saturday night with low back pain and pain shooting to the right lower abdomen.  She has suprapubic.  She has some burning with urination.  No blood in the urine. She states she has not injured her back. \"I didn't do anything\".     No increased frequency of urination.              Objective     /70 (BP Location: Left arm, Patient Position: Sitting)   Temp 36.3 °C (97.3 °F) (Skin)   Wt 58 kg (127 lb 13.9 oz)   LMP  (LMP Unknown)   BMI 21.28 kg/m²      Physical Exam  Constitutional:       Appearance: Normal appearance.   Cardiovascular:      Rate and Rhythm: Normal rate and regular rhythm.      Heart sounds: Normal heart sounds. No murmur heard.  Pulmonary:      Effort: Pulmonary effort is normal. No respiratory distress.      Breath sounds: Normal breath sounds.   Neurological:      General: No focal deficit present.      Mental Status: She is alert and oriented to person, place, and time.         Assessment/Plan   Problem List Items Addressed This Visit       Hypertension     Other Visit Diagnoses       UTI symptoms    -  Primary    Relevant Medications    nitrofurantoin, macrocrystal-monohydrate, (Macrobid) 100 mg capsule    methylPREDNISolone (Medrol Dospak) 4 mg tablets    Other Relevant Orders    POCT UA Automated manually resulted (Completed)    Urine Culture        Your blood pressure is much improved today. Stay on the same blood pressure medication.   You have back pain.  This sounds like it could be musculoskeletal but you are concerned of a  UTI.  I will order a urine culture and " antibiotics.  Drink plenty of water. Heat and Ice can be applied to the back to see if that helps.  I will prescribe a medrol dose pack to try to help your back pain.  Return in two weeks if the pain persists.  Otherwise return in three months.          Abram Salmeron, DO

## 2024-03-04 NOTE — PATIENT INSTRUCTIONS
Your blood pressure is much improved today. Stay on the same blood pressure medication.   You have back pain.  This sounds like it could be musculoskeletal but you are concerned of a  UTI.  I will order a urine culture and antibiotics.  Drink plenty of water. Heat and Ice can be applied to the back to see if that helps.  I will prescribe a medrol dose pack to try to help your back pain.

## 2024-03-05 LAB — BACTERIA UR CULT: NO GROWTH

## 2024-03-08 ENCOUNTER — TELEPHONE (OUTPATIENT)
Dept: PRIMARY CARE | Facility: CLINIC | Age: 74
End: 2024-03-08
Payer: MEDICARE

## 2024-03-08 DIAGNOSIS — R60.9 PERIPHERAL EDEMA: ICD-10-CM

## 2024-03-08 RX ORDER — FUROSEMIDE 20 MG/1
20 TABLET ORAL DAILY
Qty: 30 TABLET | Refills: 0 | Status: SHIPPED | OUTPATIENT
Start: 2024-03-08 | End: 2024-05-06 | Stop reason: SDUPTHER

## 2024-03-08 NOTE — TELEPHONE ENCOUNTER
Refill:    Furosemide 20mg daily    Pharm: [facility] # [phone]    LR: 01/25/24 qty 30 no refills  LV: 03/04/24  NV: 06/04/24

## 2024-03-11 ENCOUNTER — OFFICE VISIT (OUTPATIENT)
Dept: PRIMARY CARE | Facility: CLINIC | Age: 74
End: 2024-03-11
Payer: MEDICARE

## 2024-03-11 VITALS
BODY MASS INDEX: 21.13 KG/M2 | DIASTOLIC BLOOD PRESSURE: 68 MMHG | SYSTOLIC BLOOD PRESSURE: 120 MMHG | TEMPERATURE: 97.2 F | WEIGHT: 127 LBS

## 2024-03-11 DIAGNOSIS — R53.1 GENERALIZED WEAKNESS: ICD-10-CM

## 2024-03-11 DIAGNOSIS — R51.9 NONINTRACTABLE HEADACHE, UNSPECIFIED CHRONICITY PATTERN, UNSPECIFIED HEADACHE TYPE: ICD-10-CM

## 2024-03-11 DIAGNOSIS — R52 GENERALIZED BODY ACHES: ICD-10-CM

## 2024-03-11 DIAGNOSIS — J02.9 SORE THROAT: Primary | ICD-10-CM

## 2024-03-11 LAB
ALBUMIN SERPL BCP-MCNC: 3.5 G/DL (ref 3.4–5)
ALP SERPL-CCNC: 150 U/L (ref 33–136)
ALT SERPL W P-5'-P-CCNC: 176 U/L (ref 7–45)
ANION GAP SERPL CALC-SCNC: 14 MMOL/L (ref 10–20)
AST SERPL W P-5'-P-CCNC: 29 U/L (ref 9–39)
BASOPHILS # BLD AUTO: 0.04 X10*3/UL (ref 0–0.1)
BASOPHILS NFR BLD AUTO: 0.3 %
BILIRUB SERPL-MCNC: 0.5 MG/DL (ref 0–1.2)
BUN SERPL-MCNC: 23 MG/DL (ref 6–23)
CALCIUM SERPL-MCNC: 9.2 MG/DL (ref 8.6–10.3)
CHLORIDE SERPL-SCNC: 100 MMOL/L (ref 98–107)
CO2 SERPL-SCNC: 28 MMOL/L (ref 21–32)
CREAT SERPL-MCNC: 1.1 MG/DL (ref 0.5–1.05)
EGFRCR SERPLBLD CKD-EPI 2021: 54 ML/MIN/1.73M*2
EOSINOPHIL # BLD AUTO: 0.74 X10*3/UL (ref 0–0.4)
EOSINOPHIL NFR BLD AUTO: 5.4 %
ERYTHROCYTE [DISTWIDTH] IN BLOOD BY AUTOMATED COUNT: 17.2 % (ref 11.5–14.5)
GLUCOSE SERPL-MCNC: 109 MG/DL (ref 74–99)
HCT VFR BLD AUTO: 40.2 % (ref 36–46)
HGB BLD-MCNC: 12.7 G/DL (ref 12–16)
IMM GRANULOCYTES # BLD AUTO: 0.06 X10*3/UL (ref 0–0.5)
IMM GRANULOCYTES NFR BLD AUTO: 0.4 % (ref 0–0.9)
LYMPHOCYTES # BLD AUTO: 0.99 X10*3/UL (ref 0.8–3)
LYMPHOCYTES NFR BLD AUTO: 7.2 %
MCH RBC QN AUTO: 27.7 PG (ref 26–34)
MCHC RBC AUTO-ENTMCNC: 31.6 G/DL (ref 32–36)
MCV RBC AUTO: 88 FL (ref 80–100)
MONOCYTES # BLD AUTO: 0.21 X10*3/UL (ref 0.05–0.8)
MONOCYTES NFR BLD AUTO: 1.5 %
NEUTROPHILS # BLD AUTO: 11.69 X10*3/UL (ref 1.6–5.5)
NEUTROPHILS NFR BLD AUTO: 85.2 %
NRBC BLD-RTO: 0 /100 WBCS (ref 0–0)
PLATELET # BLD AUTO: 236 X10*3/UL (ref 150–450)
POC RAPID INFLUENZA A: NEGATIVE
POC RAPID INFLUENZA B: NEGATIVE
POC RAPID STREP: NEGATIVE
POC SARS-COV-2 AG BINAX: NORMAL
POTASSIUM SERPL-SCNC: 5.3 MMOL/L (ref 3.5–5.3)
PROT SERPL-MCNC: 5.9 G/DL (ref 6.4–8.2)
RBC # BLD AUTO: 4.59 X10*6/UL (ref 4–5.2)
SODIUM SERPL-SCNC: 137 MMOL/L (ref 136–145)
WBC # BLD AUTO: 13.7 X10*3/UL (ref 4.4–11.3)

## 2024-03-11 PROCEDURE — 36415 COLL VENOUS BLD VENIPUNCTURE: CPT

## 2024-03-11 PROCEDURE — 1160F RVW MEDS BY RX/DR IN RCRD: CPT | Performed by: FAMILY MEDICINE

## 2024-03-11 PROCEDURE — 3074F SYST BP LT 130 MM HG: CPT | Performed by: FAMILY MEDICINE

## 2024-03-11 PROCEDURE — 3078F DIAST BP <80 MM HG: CPT | Performed by: FAMILY MEDICINE

## 2024-03-11 PROCEDURE — 85025 COMPLETE CBC W/AUTO DIFF WBC: CPT

## 2024-03-11 PROCEDURE — 87811 SARS-COV-2 COVID19 W/OPTIC: CPT | Performed by: FAMILY MEDICINE

## 2024-03-11 PROCEDURE — 99214 OFFICE O/P EST MOD 30 MIN: CPT | Performed by: FAMILY MEDICINE

## 2024-03-11 PROCEDURE — 87804 INFLUENZA ASSAY W/OPTIC: CPT | Performed by: FAMILY MEDICINE

## 2024-03-11 PROCEDURE — 1126F AMNT PAIN NOTED NONE PRSNT: CPT | Performed by: FAMILY MEDICINE

## 2024-03-11 PROCEDURE — 1159F MED LIST DOCD IN RCRD: CPT | Performed by: FAMILY MEDICINE

## 2024-03-11 PROCEDURE — 80053 COMPREHEN METABOLIC PANEL: CPT

## 2024-03-11 PROCEDURE — 3008F BODY MASS INDEX DOCD: CPT | Performed by: FAMILY MEDICINE

## 2024-03-11 PROCEDURE — 87880 STREP A ASSAY W/OPTIC: CPT | Performed by: FAMILY MEDICINE

## 2024-03-11 PROCEDURE — 87086 URINE CULTURE/COLONY COUNT: CPT

## 2024-03-11 RX ORDER — TRAMADOL HYDROCHLORIDE 50 MG/1
50 TABLET ORAL EVERY 8 HOURS PRN
Qty: 10 TABLET | Refills: 0 | Status: SHIPPED | OUTPATIENT
Start: 2024-03-11 | End: 2024-03-16

## 2024-03-11 RX ORDER — AMOXICILLIN AND CLAVULANATE POTASSIUM 875; 125 MG/1; MG/1
875 TABLET, FILM COATED ORAL 2 TIMES DAILY
Qty: 20 TABLET | Refills: 0 | Status: SHIPPED | OUTPATIENT
Start: 2024-03-11 | End: 2024-03-21

## 2024-03-11 NOTE — PROGRESS NOTES
"Subjective   Patient ID: [id number]     Edu Domingo is a 73 y.o. female who presents for Generalized Body Aches; Headache; and Weakness, Gen.  HPI    She complains of generalized body aches, headache and general weakness.  This started two days ago.  This feels like her prior bouts of strep throat.  She is around grandkids that \"are always sick\".    She tested negative at home for covid.  She states someone she worked with had covid.      All of her muscles hurt.  \"Literally everything\".  Has hot and cold flashes and feels clammy.    She has one dose left of the macrobid for UTI symptoms.  We did a urine culture that showed no growth.  She is finishing a medrol dose pack.          Objective     /68 (BP Location: Right arm, Patient Position: Sitting)   Temp 36.2 °C (97.2 °F) (Skin)   Wt 57.6 kg (127 lb)   LMP  (LMP Unknown)   BMI 21.13 kg/m²      Physical Exam  Constitutional:       Appearance: Normal appearance.   HENT:      Ears:      Comments: Left ear tender.  EAC swollen     Mouth/Throat:      Pharynx: No posterior oropharyngeal erythema.   Eyes:      General:         Right eye: Right eye discharge: gmen.   Cardiovascular:      Rate and Rhythm: Normal rate and regular rhythm.      Heart sounds: Normal heart sounds. No murmur heard.  Pulmonary:      Effort: Pulmonary effort is normal. No respiratory distress.      Breath sounds: Normal breath sounds.   Neurological:      General: No focal deficit present.      Mental Status: She is alert and oriented to person, place, and time.         Assessment/Plan   Problem List Items Addressed This Visit       Cephalgia    Relevant Orders    POCT Influenza A/B manually resulted    POCT BinaxNOW Covid-19 Ag Card manually resulted     Other Visit Diagnoses       Generalized body aches        Relevant Orders    POCT Influenza A/B manually resulted    POCT BinaxNOW Covid-19 Ag Card manually resulted    Generalized weakness        Relevant Orders    POCT Influenza A/B " manually resulted    POCT BinaxNOW Covid-19 Ag Card manually resulted        I will order an antibiotic.  I will order labs.  Go to the ER if this gets any worse.  Return in one week for a recheck.      Abram Salmeron, DO

## 2024-03-12 ENCOUNTER — TELEPHONE (OUTPATIENT)
Dept: PRIMARY CARE | Facility: CLINIC | Age: 74
End: 2024-03-12
Payer: MEDICARE

## 2024-03-12 LAB — BACTERIA UR CULT: NORMAL

## 2024-03-12 NOTE — TELEPHONE ENCOUNTER
----- Message from Abram Salmeron DO sent at 3/12/2024  4:28 PM EDT -----  Please let her know her labs showed a mildly elevated white blood cell count.  This indicates that she may have a bacterial infection.  She should continue the antibiotic prescribed at her last visit.  Return in one  week for a recheck.

## 2024-03-19 ENCOUNTER — OFFICE VISIT (OUTPATIENT)
Dept: PRIMARY CARE | Facility: CLINIC | Age: 74
End: 2024-03-19
Payer: MEDICARE

## 2024-03-19 VITALS
TEMPERATURE: 97.2 F | BODY MASS INDEX: 20.91 KG/M2 | WEIGHT: 125.66 LBS | SYSTOLIC BLOOD PRESSURE: 120 MMHG | DIASTOLIC BLOOD PRESSURE: 60 MMHG

## 2024-03-19 DIAGNOSIS — R10.31 RLQ ABDOMINAL PAIN: Primary | ICD-10-CM

## 2024-03-19 PROCEDURE — 1159F MED LIST DOCD IN RCRD: CPT | Performed by: FAMILY MEDICINE

## 2024-03-19 PROCEDURE — 3008F BODY MASS INDEX DOCD: CPT | Performed by: FAMILY MEDICINE

## 2024-03-19 PROCEDURE — 3078F DIAST BP <80 MM HG: CPT | Performed by: FAMILY MEDICINE

## 2024-03-19 PROCEDURE — 99214 OFFICE O/P EST MOD 30 MIN: CPT | Performed by: FAMILY MEDICINE

## 2024-03-19 PROCEDURE — 3074F SYST BP LT 130 MM HG: CPT | Performed by: FAMILY MEDICINE

## 2024-03-19 PROCEDURE — 1160F RVW MEDS BY RX/DR IN RCRD: CPT | Performed by: FAMILY MEDICINE

## 2024-03-19 NOTE — PATIENT INSTRUCTIONS
I recommend that you go to the ER for a CT to rule out appendicitis. You do not want to do this but agree to a CT as an outpatient.  If you change your mind, just go to the ER.  I will order a CT scan.     Return after the CT.  If it shows appendicitis, then you should go to the ER to see a surgeon and have your appendix removed.

## 2024-03-19 NOTE — PROGRESS NOTES
Subjective   Patient ID: [id number]     Edu Domingo is a 73 y.o. female who presents for Follow-up (One week.).  HPI    She is here for a one week recheck.      She had headaches, fever, weakness and body aches. I prescribed antibiotics.  Labs showed a mildly elevated WBC count with a left shift.  Urine culture showed no growth.    She states she is about 10% better.     She has pain in the right back and the right lower abdomen.    She has fatigue.  She is exhausted.      No nausea, diarrhea, blood in stool.  Appetite is a little less than normal but she is eating ok.     Denies abdominal pain with bumps in the car ride.    No cough.  Had a runny nose yesterday.      Objective     /60 (BP Location: Right arm, Patient Position: Sitting)   Temp 36.2 °C (97.2 °F) (Skin)   Wt 57 kg (125 lb 10.6 oz)   LMP  (LMP Unknown)   BMI 20.91 kg/m²      Physical Exam  Constitutional:       Appearance: Normal appearance.   Cardiovascular:      Rate and Rhythm: Normal rate and regular rhythm.      Heart sounds: Normal heart sounds. No murmur heard.  Pulmonary:      Effort: Pulmonary effort is normal. No respiratory distress.      Breath sounds: Normal breath sounds.   Abdominal:      General: Abdomen is flat.      Palpations: Abdomen is soft.      Tenderness: There is abdominal tenderness (RLQ pain.  McBurney's positive.).   Neurological:      General: No focal deficit present.      Mental Status: She is alert and oriented to person, place, and time.         Assessment/Plan   Problem List Items Addressed This Visit    None  Visit Diagnoses       RLQ abdominal pain    -  Primary    Relevant Orders    CT abdomen pelvis w IV contrast        I recommend that you go to the ER for a CT to rule out appendicitis. You do not want to do this but agree to a CT as an outpatient.  If you change your mind, just go to the ER.  I will order a CT scan.     Return after the CT.  If it shows appendicitis, then you should go to the ER to see  a surgeon and have your appendix removed.      Abram Salmeron, DO

## 2024-03-21 DIAGNOSIS — R10.31 RLQ ABDOMINAL PAIN: Primary | ICD-10-CM

## 2024-03-26 ENCOUNTER — LAB (OUTPATIENT)
Dept: LAB | Facility: LAB | Age: 74
End: 2024-03-26
Payer: MEDICARE

## 2024-03-26 DIAGNOSIS — R10.31 RLQ ABDOMINAL PAIN: ICD-10-CM

## 2024-03-26 LAB
ANION GAP SERPL CALC-SCNC: 14 MMOL/L (ref 10–20)
BUN SERPL-MCNC: 24 MG/DL (ref 6–23)
CALCIUM SERPL-MCNC: 9.6 MG/DL (ref 8.6–10.6)
CHLORIDE SERPL-SCNC: 100 MMOL/L (ref 98–107)
CO2 SERPL-SCNC: 28 MMOL/L (ref 21–32)
CREAT SERPL-MCNC: 1.41 MG/DL (ref 0.5–1.05)
EGFRCR SERPLBLD CKD-EPI 2021: 40 ML/MIN/1.73M*2
GLUCOSE SERPL-MCNC: 89 MG/DL (ref 74–99)
POTASSIUM SERPL-SCNC: 4.3 MMOL/L (ref 3.5–5.3)
SODIUM SERPL-SCNC: 138 MMOL/L (ref 136–145)

## 2024-03-26 PROCEDURE — 36415 COLL VENOUS BLD VENIPUNCTURE: CPT

## 2024-03-26 PROCEDURE — 80048 BASIC METABOLIC PNL TOTAL CA: CPT

## 2024-03-28 DIAGNOSIS — R52 GENERALIZED BODY ACHES: Primary | ICD-10-CM

## 2024-03-28 RX ORDER — TRAMADOL HYDROCHLORIDE 50 MG/1
50 TABLET ORAL EVERY 8 HOURS PRN
Qty: 10 TABLET | Refills: 0 | Status: SHIPPED | OUTPATIENT
Start: 2024-03-28 | End: 2024-04-02

## 2024-03-29 ENCOUNTER — OFFICE VISIT (OUTPATIENT)
Dept: DERMATOLOGY | Facility: CLINIC | Age: 74
End: 2024-03-29
Payer: MEDICARE

## 2024-03-29 DIAGNOSIS — D48.5 NEOPLASM OF UNCERTAIN BEHAVIOR OF SKIN: Primary | ICD-10-CM

## 2024-03-29 DIAGNOSIS — L57.8 OTHER SKIN CHANGES DUE TO CHRONIC EXPOSURE TO NONIONIZING RADIATION: ICD-10-CM

## 2024-03-29 DIAGNOSIS — L82.1 SEBORRHEIC KERATOSIS: ICD-10-CM

## 2024-03-29 DIAGNOSIS — L81.4 LENTIGO: ICD-10-CM

## 2024-03-29 DIAGNOSIS — Z85.828 PERSONAL HISTORY OF SKIN CANCER: ICD-10-CM

## 2024-03-29 DIAGNOSIS — L57.0 ACTINIC KERATOSES: ICD-10-CM

## 2024-03-29 PROCEDURE — 17004 DESTROY PREMAL LESIONS 15/>: CPT | Performed by: STUDENT IN AN ORGANIZED HEALTH CARE EDUCATION/TRAINING PROGRAM

## 2024-03-29 PROCEDURE — 11103 TANGNTL BX SKIN EA SEP/ADDL: CPT | Performed by: STUDENT IN AN ORGANIZED HEALTH CARE EDUCATION/TRAINING PROGRAM

## 2024-03-29 PROCEDURE — 11102 TANGNTL BX SKIN SINGLE LES: CPT | Performed by: STUDENT IN AN ORGANIZED HEALTH CARE EDUCATION/TRAINING PROGRAM

## 2024-03-29 PROCEDURE — 3008F BODY MASS INDEX DOCD: CPT | Performed by: STUDENT IN AN ORGANIZED HEALTH CARE EDUCATION/TRAINING PROGRAM

## 2024-03-29 PROCEDURE — 99213 OFFICE O/P EST LOW 20 MIN: CPT | Performed by: STUDENT IN AN ORGANIZED HEALTH CARE EDUCATION/TRAINING PROGRAM

## 2024-03-29 PROCEDURE — 1160F RVW MEDS BY RX/DR IN RCRD: CPT | Performed by: STUDENT IN AN ORGANIZED HEALTH CARE EDUCATION/TRAINING PROGRAM

## 2024-03-29 PROCEDURE — 88305 TISSUE EXAM BY PATHOLOGIST: CPT | Performed by: DERMATOLOGY

## 2024-03-29 PROCEDURE — 1159F MED LIST DOCD IN RCRD: CPT | Performed by: STUDENT IN AN ORGANIZED HEALTH CARE EDUCATION/TRAINING PROGRAM

## 2024-03-29 NOTE — PROGRESS NOTES
Subjective     Edu Domingo is a 73 y.o. female who presents for the following: Skin Check (Reports she has several scaly lesions scattered on body. ).     Review of Systems:  No other skin or systemic complaints other than what is documented elsewhere in the note.    The following portions of the chart were reviewed this encounter and updated as appropriate:         Skin Cancer History  SCCIS left medial thigh s/p EDC 2022      Specialty Problems          Dermatology Problems    Actinic keratosis    Hemangioma of skin and subcutaneous tissue    Neoplasm of uncertain behavior of skin    Other melanin hyperpigmentation    Other seborrheic keratosis    Personal history of other malignant neoplasm of skin    Squamous cell carcinoma of skin of lower limb, including hip    Back skin lesion    Benign skin cyst    Hives    Multiple dysplastic nevi    Pain due to onychomycosis of nail    Skin lesion of face    Skin lesion of lower extremity    Squamous cell skin cancer        Objective   Well appearing patient in no apparent distress; mood and affect are within normal limits.    A full examination was performed including scalp, head, eyes, ears, nose, lips, neck, chest, axillae, abdomen, back, buttocks, bilateral upper extremities, bilateral lower extremities, hands, feet, fingers, toes, fingernails, and toenails. All findings within normal limits unless otherwise noted below.    Assessment/Plan   1. Neoplasm of uncertain behavior of skin (4)  Left Medial Thigh  1 cm pink scaly plaque          Lesion biopsy  Type of biopsy: tangential    Informed consent: discussed and consent obtained    Timeout: patient name, date of birth, surgical site, and procedure verified    Procedure prep:  Patient was prepped and draped  Anesthesia: the lesion was anesthetized in a standard fashion    Anesthetic:  1% lidocaine w/ epinephrine 1-100,000 local infiltration  Instrument used: DermaBlade    Hemostasis achieved with: aluminum chloride     Outcome: patient tolerated procedure well    Post-procedure details: sterile dressing applied and wound care instructions given    Dressing type: petrolatum and bandage      Staff Communication: Dermatology Local Anesthesia: 1 % Lidocaine / Epinephrine - Amount: 1 ml    Specimen 1 - Dermatopathology- DERM LAB  Differential Diagnosis: hak vs scc  Check Margins Yes/No?:    Comments:    Dermpath Lab: Routine Histopathology (formalin-fixed tissue)    Left Thigh - Lateral  1 cm pink scaly plaque          Staff Communication: Dermatology Local Anesthesia: 1 % Lidocaine / Epinephrine - Amount: 1 ml    Lesion biopsy  Type of biopsy: tangential    Informed consent: discussed and consent obtained    Timeout: patient name, date of birth, surgical site, and procedure verified    Procedure prep:  Patient was prepped and draped  Anesthesia: the lesion was anesthetized in a standard fashion    Anesthetic:  1% lidocaine w/ epinephrine 1-100,000 local infiltration  Instrument used: DermaBlade    Hemostasis achieved with: aluminum chloride    Outcome: patient tolerated procedure well    Post-procedure details: sterile dressing applied and wound care instructions given    Dressing type: petrolatum and bandage      Specimen 2 - Dermatopathology- DERM LAB  Differential Diagnosis: hak vs scc  Check Margins Yes/No?:    Comments:    Dermpath Lab: Routine Histopathology (formalin-fixed tissue)    Right Knee - Medial  2.2 cm irregular pink plaque          Staff Communication: Dermatology Local Anesthesia: 1 % Lidocaine / Epinephrine - Amount: 1 ml    Lesion biopsy  Type of biopsy: tangential    Informed consent: discussed and consent obtained    Timeout: patient name, date of birth, surgical site, and procedure verified    Procedure prep:  Patient was prepped and draped  Anesthesia: the lesion was anesthetized in a standard fashion    Anesthetic:  1% lidocaine w/ epinephrine 1-100,000 local infiltration  Instrument used: DermaBlade     Hemostasis achieved with: aluminum chloride    Outcome: patient tolerated procedure well    Post-procedure details: sterile dressing applied and wound care instructions given    Dressing type: petrolatum and bandage      Specimen 3 - Dermatopathology- DERM LAB  Differential Diagnosis: hak vs scc  Check Margins Yes/No?:    Comments:    Dermpath Lab: Routine Histopathology (formalin-fixed tissue)    Left Medial Heel  1.4 cm hyperkeratotic well defined plaque          Staff Communication: Dermatology Local Anesthesia: 1 % Lidocaine / Epinephrine - Amount: 1 ml    Lesion biopsy  Type of biopsy: tangential    Informed consent: discussed and consent obtained    Timeout: patient name, date of birth, surgical site, and procedure verified    Procedure prep:  Patient was prepped and draped  Anesthesia: the lesion was anesthetized in a standard fashion    Anesthetic:  1% lidocaine w/ epinephrine 1-100,000 local infiltration  Instrument used: DermaBlade    Hemostasis achieved with: aluminum chloride    Outcome: patient tolerated procedure well    Post-procedure details: sterile dressing applied and wound care instructions given    Dressing type: petrolatum and bandage      Specimen 4 - Dermatopathology- DERM LAB  Differential Diagnosis: hak vs scc  Check Margins Yes/No?:    Comments:    Dermpath Lab: Routine Histopathology (formalin-fixed tissue)        2. Other skin changes due to chronic exposure to nonionizing radiation  Mottled pigmentation, telangiectasias and brown reticular macules in sun exposed areas on the face, extremities, trunk    The risk of chronic, cumulative sun damage and risk of development of skin cancer was reviewed with the patient today. Discussed important of sun protection with sun protective clothing and/or broad spectrum sunscreen spf 30 or above.  Warning signs of skin cancer were reviewed. Patient to contact office should they notice any new or changing pre-existing skin lesion.    Related  Procedures  Follow Up In Dermatology - Established Patient    3. Actinic keratoses (17)  Left Forearm - Anterior, Left Knee - Anterior, Left Lower Leg - Anterior (2), Left Shoulder - Anterior, Left Thigh - Anterior (3), Left Upper Arm - Anterior, Right Knee - Anterior, Right Lower Leg - Anterior (3), Right Thigh - Anterior (4)  Erythematous gritty macule(s)    Lesions are due to chronic, cumulative sun damage over time and are pre-cancerous. They have a risk of developing into squamous cell carcinoma and therefore treatment recommendations were offered and discussed with the patient. Discussed LN2 & topical chemotherapy creams, risks and benefits of each. The risks and benefits of LN2 were reviewed including incomplete removal, crusting, blister hypo and/or hyperpigmentation, scarring. The patient elected for LN2 today.    Destr of lesion - Left Forearm - Anterior, Left Knee - Anterior, Left Lower Leg - Anterior (2), Left Shoulder - Anterior, Left Thigh - Anterior (3), Left Upper Arm - Anterior, Right Knee - Anterior, Right Lower Leg - Anterior (3), Right Thigh - Anterior (4)  Complexity: simple    Destruction method: cryotherapy    Informed consent: discussed and consent obtained    Lesion destroyed using liquid nitrogen: Yes    Cryotherapy cycles:  1  Outcome: patient tolerated procedure well with no complications    Post-procedure details: wound care instructions given      4. Lentigo  Scattered tan macules in sun-exposed areas.    Benign nature of these skin lesions reviewed and relation to sun exposure discussed. Reassurance provided. Reviewed warning signs of skin cancer.    5. Seborrheic keratosis  Stuck on verrucous, tan-brown papules and plaques.      The benign nature of these skin lesions were reviewed with the patient today and reassurance was provided. Patient advised to return for f/u if the lesions change in size, shape, color, become painful, tender, itch or bleed.    6. Personal history of skin  cancer  Right Medial Thigh  Scar at site of prior procedure    There is no evidence of recurrence on clinical examination today, reassurance was provided to the patient. Discussed that hx of skin cancer increases risk of developing future skin cancer and total body skin exams are warranted every 3 mos

## 2024-04-02 ENCOUNTER — APPOINTMENT (OUTPATIENT)
Dept: RADIOLOGY | Facility: CLINIC | Age: 74
End: 2024-04-02
Payer: MEDICARE

## 2024-04-04 DIAGNOSIS — C44.722 SQUAMOUS CELL CARCINOMA OF SKIN OF RIGHT LOWER LIMB, INCLUDING HIP: Primary | ICD-10-CM

## 2024-04-04 NOTE — RESULT ENCOUNTER NOTE
Spoke with patient regarding recent shave biopsy results:  1. Left Medial Thigh-Sccis- Needs follow up visit to discuss treatment options.  2. Left lateral Thigh- Sccis -Needs follow up visit to discuss treatment options.  3. Right Mid-Knee- SCC -Requiring Mohs surgery.  4. Left Medial Knee- AK with Focal SCCis - Needs follow up visit to discuss treatment options.

## 2024-04-05 ENCOUNTER — HOSPITAL ENCOUNTER (OUTPATIENT)
Dept: RADIOLOGY | Facility: CLINIC | Age: 74
Discharge: HOME | End: 2024-04-05
Payer: MEDICARE

## 2024-04-05 DIAGNOSIS — R10.31 RLQ ABDOMINAL PAIN: ICD-10-CM

## 2024-04-05 PROCEDURE — 74177 CT ABD & PELVIS W/CONTRAST: CPT | Performed by: STUDENT IN AN ORGANIZED HEALTH CARE EDUCATION/TRAINING PROGRAM

## 2024-04-05 PROCEDURE — 2550000001 HC RX 255 CONTRASTS: Performed by: FAMILY MEDICINE

## 2024-04-05 PROCEDURE — 74177 CT ABD & PELVIS W/CONTRAST: CPT

## 2024-04-05 RX ADMIN — IOHEXOL 70 ML: 350 INJECTION, SOLUTION INTRAVENOUS at 09:48

## 2024-04-09 ENCOUNTER — TELEPHONE (OUTPATIENT)
Dept: PRIMARY CARE | Facility: CLINIC | Age: 74
End: 2024-04-09
Payer: MEDICARE

## 2024-04-09 DIAGNOSIS — M19.91 PRIMARY OSTEOARTHRITIS, UNSPECIFIED SITE: ICD-10-CM

## 2024-04-09 DIAGNOSIS — F32.0 DEPRESSION, MAJOR, SINGLE EPISODE, MILD (CMS-HCC): ICD-10-CM

## 2024-04-09 RX ORDER — CELECOXIB 200 MG/1
200 CAPSULE ORAL DAILY PRN
Qty: 90 CAPSULE | Refills: 1 | Status: SHIPPED | OUTPATIENT
Start: 2024-04-09 | End: 2024-06-04 | Stop reason: WASHOUT

## 2024-04-09 RX ORDER — VENLAFAXINE HYDROCHLORIDE 37.5 MG/1
37.5 CAPSULE, EXTENDED RELEASE ORAL
Qty: 90 CAPSULE | Refills: 1 | Status: SHIPPED | OUTPATIENT
Start: 2024-04-09 | End: 2024-06-04 | Stop reason: WASHOUT

## 2024-04-09 RX ORDER — ALPRAZOLAM 0.5 MG/1
0.5 TABLET ORAL 3 TIMES DAILY PRN
Qty: 90 TABLET | Refills: 0 | Status: SHIPPED | OUTPATIENT
Start: 2024-04-09 | End: 2024-05-06 | Stop reason: SDUPTHER

## 2024-04-09 NOTE — TELEPHONE ENCOUNTER
REFILL  MEDICATION:     Alprazolam 0.5 MG; Take 1 tablet 3 times a day as needed for anxiety.    LR: 2/1/24     90 tablets with 0 refills     Venlafaxine XR 37.5 MG; Take 1 capsule once daily. Take with food.     LR: 10/9/23    90 capsules with 1 refill     Celecoxib 200 MG; Take 1 capsule once daily as needed for moderate pain (4-6).     LR: 7/6/23     90 capsules with 1 refill     PHARM: [facility]   PHARM NUMBER: [phone]    LV: 3/19/24  NV: 6/4/24

## 2024-04-10 ENCOUNTER — TELEPHONE (OUTPATIENT)
Dept: PRIMARY CARE | Facility: CLINIC | Age: 74
End: 2024-04-10
Payer: MEDICARE

## 2024-04-10 DIAGNOSIS — I25.10 CAD S/P PERCUTANEOUS CORONARY ANGIOPLASTY: ICD-10-CM

## 2024-04-10 DIAGNOSIS — Z98.61 CAD S/P PERCUTANEOUS CORONARY ANGIOPLASTY: ICD-10-CM

## 2024-04-10 NOTE — TELEPHONE ENCOUNTER
Refill Carvedilol 25mg 1 BID      Pharmacy: [facility] [place]      LR: 10/09/23  LV: 03/19/23  NV: 04/12/23

## 2024-04-11 RX ORDER — CARVEDILOL 25 MG/1
25 TABLET ORAL
Qty: 180 TABLET | Refills: 1 | Status: SHIPPED | OUTPATIENT
Start: 2024-04-11

## 2024-04-12 ENCOUNTER — OFFICE VISIT (OUTPATIENT)
Dept: PRIMARY CARE | Facility: CLINIC | Age: 74
End: 2024-04-12
Payer: MEDICARE

## 2024-04-12 VITALS
SYSTOLIC BLOOD PRESSURE: 120 MMHG | BODY MASS INDEX: 21.3 KG/M2 | WEIGHT: 128 LBS | DIASTOLIC BLOOD PRESSURE: 70 MMHG | TEMPERATURE: 97.9 F

## 2024-04-12 DIAGNOSIS — N20.1 URETEROLITHIASIS: Primary | ICD-10-CM

## 2024-04-12 PROCEDURE — 3008F BODY MASS INDEX DOCD: CPT | Performed by: FAMILY MEDICINE

## 2024-04-12 PROCEDURE — 3074F SYST BP LT 130 MM HG: CPT | Performed by: FAMILY MEDICINE

## 2024-04-12 PROCEDURE — 3078F DIAST BP <80 MM HG: CPT | Performed by: FAMILY MEDICINE

## 2024-04-12 PROCEDURE — 1159F MED LIST DOCD IN RCRD: CPT | Performed by: FAMILY MEDICINE

## 2024-04-12 PROCEDURE — 1160F RVW MEDS BY RX/DR IN RCRD: CPT | Performed by: FAMILY MEDICINE

## 2024-04-12 PROCEDURE — 99214 OFFICE O/P EST MOD 30 MIN: CPT | Performed by: FAMILY MEDICINE

## 2024-04-12 NOTE — PROGRESS NOTES
"Subjective   Patient ID: [id number]     Edu Domingo is a 73 y.o. female who presents for Follow-up (Discuss CT results.).  HPI  She is here for a recheck after getting her CT of the abdomen and pelvis.    She still has back pain and right sided \"weird pain\".  Short bursts.  It is not all the time.      CT showed 1 to 2 mm kidney stones on the right distal ureter.            Objective     /70 (BP Location: Right arm, Patient Position: Sitting)   Temp 36.6 °C (97.9 °F) (Skin)   Wt 58.1 kg (128 lb)   LMP  (LMP Unknown)   BMI 21.30 kg/m²      Physical Exam  Constitutional:       General: She is not in acute distress.     Appearance: Normal appearance. She is not ill-appearing or toxic-appearing.   Cardiovascular:      Rate and Rhythm: Normal rate and regular rhythm.      Heart sounds: Normal heart sounds. No murmur heard.  Pulmonary:      Effort: Pulmonary effort is normal. No respiratory distress.      Breath sounds: Normal breath sounds.   Abdominal:      General: Abdomen is flat.      Palpations: Abdomen is soft.      Tenderness: There is abdominal tenderness (mild right sided tenderness.). There is no guarding or rebound.   Neurological:      General: No focal deficit present.      Mental Status: She is alert and oriented to person, place, and time.         Assessment/Plan   Problem List Items Addressed This Visit    None  Visit Diagnoses       Ureterolithiasis    -  Primary        You CT showed right sided kidney stones.  I would think these would be small enough to pass on their own but you have had this right sided pain for quite some time, and I recommend that you see your urologist.  You will call Monica Jones CNP, whom you have seen before.  Drink plenty of water.  Return if it is not thought to be urologic and your pain continues.      Abram Salmeron, DO   "

## 2024-04-12 NOTE — PATIENT INSTRUCTIONS
You CT showed right sided kidney stones.  I would think these would be small enough to pass on their own but you have had this right sided pain for quite some time, and I recommend that you see your urologist.  You will call Monica Jones CNP, whom you have seen before.  Drink plenty of water.  Return if it is not thought to be urologic and your pain continues.

## 2024-04-22 ENCOUNTER — OFFICE VISIT (OUTPATIENT)
Dept: DERMATOLOGY | Facility: CLINIC | Age: 74
End: 2024-04-22
Payer: MEDICARE

## 2024-04-22 DIAGNOSIS — L85.3 XEROSIS CUTIS: Primary | ICD-10-CM

## 2024-04-22 DIAGNOSIS — D04.72 SQUAMOUS CELL CARCINOMA IN SITU (SCCIS) OF SKIN OF LEFT LOWER LEG: ICD-10-CM

## 2024-04-22 DIAGNOSIS — L57.0 ACTINIC KERATOSES: ICD-10-CM

## 2024-04-22 PROCEDURE — 1160F RVW MEDS BY RX/DR IN RCRD: CPT | Performed by: STUDENT IN AN ORGANIZED HEALTH CARE EDUCATION/TRAINING PROGRAM

## 2024-04-22 PROCEDURE — 99213 OFFICE O/P EST LOW 20 MIN: CPT | Performed by: STUDENT IN AN ORGANIZED HEALTH CARE EDUCATION/TRAINING PROGRAM

## 2024-04-22 PROCEDURE — 1159F MED LIST DOCD IN RCRD: CPT | Performed by: STUDENT IN AN ORGANIZED HEALTH CARE EDUCATION/TRAINING PROGRAM

## 2024-04-22 PROCEDURE — 17003 DESTRUCT PREMALG LES 2-14: CPT | Performed by: STUDENT IN AN ORGANIZED HEALTH CARE EDUCATION/TRAINING PROGRAM

## 2024-04-22 PROCEDURE — 3008F BODY MASS INDEX DOCD: CPT | Performed by: STUDENT IN AN ORGANIZED HEALTH CARE EDUCATION/TRAINING PROGRAM

## 2024-04-22 PROCEDURE — 17000 DESTRUCT PREMALG LESION: CPT | Performed by: STUDENT IN AN ORGANIZED HEALTH CARE EDUCATION/TRAINING PROGRAM

## 2024-04-22 PROCEDURE — 17263 DSTRJ MAL LES T/A/L 2.1-3.0: CPT | Performed by: STUDENT IN AN ORGANIZED HEALTH CARE EDUCATION/TRAINING PROGRAM

## 2024-04-22 RX ORDER — AMMONIUM LACTATE 12 G/100G
LOTION TOPICAL AS NEEDED
Qty: 296 G | Refills: 11 | Status: SHIPPED | OUTPATIENT
Start: 2024-04-22 | End: 2025-04-22

## 2024-04-22 NOTE — PROGRESS NOTES
Subjective     Edu Domingo is a 73 y.o. female who presents for the following: Follow-up (Pt here to discuss treatment options for left medial thigh, left thigh and left medial knee (biopsy completed on 3/29/24.).     Review of Systems:  No other skin or systemic complaints other than what is documented elsewhere in the note.    The following portions of the chart were reviewed this encounter and updated as appropriate:          Skin Cancer History  Biopsy Date Type Location Status   3/29/24 SCC in Situ Left Medial Thigh Patient/Caregiver Informed   3/29/24 SCC Right Knee - Medial Refer Mohs/Surgeon   3/29/24 SCC in Situ Left Thigh - Lateral Patient/Caregiver Informed   3/29/24 SCC in Situ Left Medial Heel Patient/Caregiver Informed       Specialty Problems          Dermatology Problems    Actinic keratosis    Hemangioma of skin and subcutaneous tissue    Neoplasm of uncertain behavior of skin    Other melanin hyperpigmentation    Other seborrheic keratosis    Personal history of other malignant neoplasm of skin    Squamous cell carcinoma of skin of lower limb, including hip    Back skin lesion    Benign skin cyst    Hives    Multiple dysplastic nevi    Pain due to onychomycosis of nail    Skin lesion of face    Skin lesion of lower extremity    Squamous cell skin cancer        Objective   Well appearing patient in no apparent distress; mood and affect are within normal limits.    A focused skin examination was performed. All findings within normal limits unless otherwise noted below.    Assessment/Plan   1. Xerosis cutis (2)  Left Lower Leg - Anterior, Right Knee - Anterior  Xerotic scaly patches      The nature of the diagnosis was explained to the patient today. Dry skin is common, can be worsened due to climate (dry climate makes worse), harsh soaps and lack of moisturizing. It may worsen as we age. Apply Lac-Hydrin to affected area daily.       Related Medications  ammonium lactate (Lac-Hydrin) 12 %  lotion  Apply topically if needed for dry skin.    2. Actinic keratoses (5)  Left Thigh - Anterior (3), Right Knee - Anterior, Right Thigh - Anterior  Erythematous gritty macule(s)    Lesions are due to chronic, cumulative sun damage over time and are pre-cancerous. They have a risk of developing into squamous cell carcinoma and therefore treatment recommendations were offered and discussed with the patient. Discussed LN2 & topical chemotherapy creams, risks and benefits of each. The risks and benefits of LN2 were reviewed including incomplete removal, crusting, blister hypo and/or hyperpigmentation, scarring. The patient elected for LN2 today.    Destr of lesion - Left Thigh - Anterior (3), Right Knee - Anterior, Right Thigh - Anterior  Complexity: simple    Destruction method: cryotherapy    Informed consent: discussed and consent obtained    Lesion destroyed using liquid nitrogen: Yes    Cryotherapy cycles:  1  Outcome: patient tolerated procedure well with no complications    Post-procedure details: wound care instructions given      3. 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 (2)  Left Medial Thigh  1.2 cm scaly patch    Destr of lesion  Complexity: simple    Destruction method: electrodesiccation and curettage    Informed consent: discussed and consent obtained    Timeout:  patient name, date of birth, surgical site, and procedure verified  Procedure prep:  Patient was prepped and draped  Anesthesia: the lesion was anesthetized in a standard fashion    Anesthetic:  1% lidocaine w/ epinephrine 1-100,000 local infiltration  Curettage performed in three different directions: Yes    Electrodesiccation performed over the curetted area: Yes    Curettage cycles:  3  Lesion length (cm):  1.2  Lesion width (cm):  1.2  Margin per side (cm):  0.5  Final wound size (cm):  2.2  Hemostasis achieved with:  electrodesiccation  Outcome: patient tolerated procedure well with no complications     Post-procedure details: sterile dressing applied and wound care instructions given    Dressing type: bandage and petrolatum      Left Thigh - Lateral  1.1 cm scaly patch    Destr of lesion  Complexity: simple    Destruction method: electrodesiccation and curettage    Informed consent: discussed and consent obtained    Timeout:  patient name, date of birth, surgical site, and procedure verified  Procedure prep:  Patient was prepped and draped  Anesthesia: the lesion was anesthetized in a standard fashion    Anesthetic:  1% lidocaine w/ epinephrine 1-100,000 local infiltration  Curettage performed in three different directions: Yes    Electrodesiccation performed over the curetted area: Yes    Curettage cycles:  3  Lesion length (cm):  1.1  Lesion width (cm):  1.1  Margin per side (cm):  0.5  Final wound size (cm):  2.1  Hemostasis achieved with:  electrodesiccation  Outcome: patient tolerated procedure well with no complications    Post-procedure details: sterile dressing applied and wound care instructions given    Dressing type: bandage and petrolatum

## 2024-04-23 DIAGNOSIS — D04.72 SQUAMOUS CELL CARCINOMA IN SITU (SCCIS) OF LEFT FOOT: Primary | ICD-10-CM

## 2024-04-23 LAB
CLINICAL SIG UPDATED INFO: NORMAL
LABORATORY COMMENT REPORT: NORMAL
PATH REPORT.FINAL DX SPEC: NORMAL
PATH REPORT.GROSS SPEC: NORMAL
PATH REPORT.MICROSCOPIC SPEC OTHER STN: NORMAL
PATH REPORT.RELEVANT HX SPEC: NORMAL
PATH REPORT.TOTAL CANCER: NORMAL

## 2024-05-06 ENCOUNTER — TELEPHONE (OUTPATIENT)
Dept: PRIMARY CARE | Facility: CLINIC | Age: 74
End: 2024-05-06
Payer: MEDICARE

## 2024-05-06 DIAGNOSIS — I10 PRIMARY HYPERTENSION: ICD-10-CM

## 2024-05-06 DIAGNOSIS — R60.9 PERIPHERAL EDEMA: ICD-10-CM

## 2024-05-06 DIAGNOSIS — F32.0 DEPRESSION, MAJOR, SINGLE EPISODE, MILD (CMS-HCC): ICD-10-CM

## 2024-05-06 RX ORDER — AMLODIPINE BESYLATE 5 MG/1
5 TABLET ORAL DAILY
Qty: 30 TABLET | Refills: 2 | Status: SHIPPED | OUTPATIENT
Start: 2024-05-06 | End: 2024-11-02

## 2024-05-06 RX ORDER — FUROSEMIDE 20 MG/1
20 TABLET ORAL DAILY
Qty: 30 TABLET | Refills: 0 | Status: SHIPPED | OUTPATIENT
Start: 2024-05-06 | End: 2025-05-06

## 2024-05-06 RX ORDER — ALPRAZOLAM 0.5 MG/1
0.5 TABLET ORAL 3 TIMES DAILY PRN
Qty: 90 TABLET | Refills: 0 | Status: SHIPPED | OUTPATIENT
Start: 2024-05-06

## 2024-05-06 NOTE — TELEPHONE ENCOUNTER
REFILL  MEDICATION:     Alprazolam 0.5 MG; Take 1 tablet 3 times a day as needed for anxiety.     LR: 4/9/24     90 tablets with 0 refills     Furosemide 20 MG; Take 1 tablet once daily.    LR: 3/8/24     30 tablets with 0 refills     Amlodipine 5 MG; Take 1 tablet once daily.    LR: 2/1/24      30 tablets with 2 refills     PHARM: [facility]   PHARM NUMBER: [phone]    LV: 4/12/24  NV: 6/4/24

## 2024-05-16 ENCOUNTER — PATIENT MESSAGE (OUTPATIENT)
Dept: PRIMARY CARE | Facility: CLINIC | Age: 74
End: 2024-05-16
Payer: MEDICARE

## 2024-05-16 DIAGNOSIS — I25.10 CAD S/P PERCUTANEOUS CORONARY ANGIOPLASTY: ICD-10-CM

## 2024-05-16 DIAGNOSIS — Z98.61 CAD S/P PERCUTANEOUS CORONARY ANGIOPLASTY: ICD-10-CM

## 2024-05-16 RX ORDER — CALCIUM CARBONATE 300MG(750)
1 TABLET,CHEWABLE ORAL DAILY
Qty: 90 TABLET | Refills: 1 | Status: SHIPPED | OUTPATIENT
Start: 2024-05-16

## 2024-05-16 NOTE — TELEPHONE ENCOUNTER
From: Edu Domingo  To: Abram Salmeron  Sent: 5/16/2024 2:49 PM EDT  Subject: magnesium oxide 400 mg tablet refill request    Need the above RX refilled. [facility] [place] [phone].  Thank you  Edu

## 2024-05-23 ENCOUNTER — OFFICE VISIT (OUTPATIENT)
Dept: UROLOGY | Facility: CLINIC | Age: 74
End: 2024-05-23
Payer: MEDICARE

## 2024-05-23 ENCOUNTER — LAB (OUTPATIENT)
Dept: LAB | Facility: LAB | Age: 74
End: 2024-05-23
Payer: MEDICARE

## 2024-05-23 VITALS — DIASTOLIC BLOOD PRESSURE: 78 MMHG | HEART RATE: 78 BPM | SYSTOLIC BLOOD PRESSURE: 130 MMHG | TEMPERATURE: 97.1 F

## 2024-05-23 DIAGNOSIS — N20.1 CALCULUS OF RIGHT URETER: ICD-10-CM

## 2024-05-23 DIAGNOSIS — N20.0 KIDNEY STONES: Primary | ICD-10-CM

## 2024-05-23 DIAGNOSIS — N20.0 KIDNEY STONES: ICD-10-CM

## 2024-05-23 LAB
ANION GAP SERPL CALC-SCNC: 11 MMOL/L (ref 10–20)
BUN SERPL-MCNC: 23 MG/DL (ref 6–23)
CALCIUM SERPL-MCNC: 9.4 MG/DL (ref 8.6–10.3)
CHLORIDE SERPL-SCNC: 101 MMOL/L (ref 98–107)
CO2 SERPL-SCNC: 29 MMOL/L (ref 21–32)
CREAT SERPL-MCNC: 1.3 MG/DL (ref 0.5–1.05)
EGFRCR SERPLBLD CKD-EPI 2021: 44 ML/MIN/1.73M*2
GLUCOSE SERPL-MCNC: 105 MG/DL (ref 74–99)
POC APPEARANCE, URINE: CLEAR
POC BILIRUBIN, URINE: NEGATIVE
POC BLOOD, URINE: NEGATIVE
POC COLOR, URINE: YELLOW
POC GLUCOSE, URINE: NEGATIVE MG/DL
POC KETONES, URINE: NEGATIVE MG/DL
POC LEUKOCYTES, URINE: NEGATIVE
POC NITRITE,URINE: NEGATIVE
POC PH, URINE: 5.5 PH
POC PROTEIN, URINE: NEGATIVE MG/DL
POC SPECIFIC GRAVITY, URINE: 1.02
POC UROBILINOGEN, URINE: 0.2 EU/DL
POTASSIUM SERPL-SCNC: 4.3 MMOL/L (ref 3.5–5.3)
SODIUM SERPL-SCNC: 137 MMOL/L (ref 136–145)

## 2024-05-23 PROCEDURE — 1159F MED LIST DOCD IN RCRD: CPT | Performed by: NURSE PRACTITIONER

## 2024-05-23 PROCEDURE — 3078F DIAST BP <80 MM HG: CPT | Performed by: NURSE PRACTITIONER

## 2024-05-23 PROCEDURE — 99215 OFFICE O/P EST HI 40 MIN: CPT | Performed by: NURSE PRACTITIONER

## 2024-05-23 PROCEDURE — 81003 URINALYSIS AUTO W/O SCOPE: CPT | Performed by: NURSE PRACTITIONER

## 2024-05-23 PROCEDURE — 1160F RVW MEDS BY RX/DR IN RCRD: CPT | Performed by: NURSE PRACTITIONER

## 2024-05-23 PROCEDURE — 3008F BODY MASS INDEX DOCD: CPT | Performed by: NURSE PRACTITIONER

## 2024-05-23 PROCEDURE — 3075F SYST BP GE 130 - 139MM HG: CPT | Performed by: NURSE PRACTITIONER

## 2024-05-23 PROCEDURE — 80048 BASIC METABOLIC PNL TOTAL CA: CPT

## 2024-05-23 PROCEDURE — 36415 COLL VENOUS BLD VENIPUNCTURE: CPT

## 2024-05-23 RX ORDER — TRAMADOL HYDROCHLORIDE 50 MG/1
50 TABLET ORAL 2 TIMES DAILY PRN
Qty: 7 TABLET | Refills: 0 | Status: SHIPPED | OUTPATIENT
Start: 2024-05-23

## 2024-05-23 RX ORDER — TAMSULOSIN HYDROCHLORIDE 0.4 MG/1
0.4 CAPSULE ORAL NIGHTLY
Qty: 30 CAPSULE | Refills: 0 | Status: SHIPPED | OUTPATIENT
Start: 2024-05-23 | End: 2024-06-22

## 2024-05-23 NOTE — PROGRESS NOTES
Subjective   Patient ID: Edu Domingo is a 73 y.o. female who presents for Kidney Stones Follow Up .  Presents for eval of nephrolithiasis. Patient states she has a dull ache in RLQ and sharp right flank pain which she states is constant. Underwent CT which identified stones. Patient denies previous episodes of stones.     CT identified likely two 1-2 mm right distal ureteral stones as well as a few 3mm or small right nephrolithiasis. None in the left.     Drinking water.  Established with Dr. Linton for OAB, managed on Gemtesa.         Review of Systems   All other systems reviewed and are negative.      Objective   Physical Exam  Vitals reviewed.     Alert and oriented x3  Moist mucous membranes  Breathes easily on room air  Abdomen soft, nondistended  No edema  No scleral icterus  No focal neurological deficits  Appears stated age, no acute distress    Office Visit on 05/23/2024   Component Date Value Ref Range Status    POC Color, Urine 05/23/2024 Yellow  Straw, Yellow, Light-Yellow Final    POC Appearance, Urine 05/23/2024 Clear  Clear Final    POC Glucose, Urine 05/23/2024 NEGATIVE  NEGATIVE mg/dl Final    POC Bilirubin, Urine 05/23/2024 NEGATIVE  NEGATIVE Final    POC Ketones, Urine 05/23/2024 NEGATIVE  NEGATIVE mg/dl Final    POC Specific Gravity, Urine 05/23/2024 1.020  1.005 - 1.035 Final    POC Blood, Urine 05/23/2024 NEGATIVE  NEGATIVE Final    POC PH, Urine 05/23/2024 5.5  No Reference Range Established PH Final    POC Protein, Urine 05/23/2024 NEGATIVE  NEGATIVE, 30 (1+) mg/dl Final    POC Urobilinogen, Urine 05/23/2024 0.2  0.2, 1.0 EU/DL Final    Poc Nitrite, Urine 05/23/2024 NEGATIVE  NEGATIVE Final    POC Leukocytes, Urine 05/23/2024 NEGATIVE  NEGATIVE Final   Office Visit on 03/29/2024   Component Date Value Ref Range Status    Case Report 03/29/2024    Final                    Value:Dermatopathology                                  Case: [id number]                                   Authorizing  Provider:  Aimee Ochoa MD      Collected:           03/29/2024 0935              Ordering Location:     [facility]       Received:            03/29/2024 1610              Pathologist:           Pantera Pa MD                                                             Specimens:   A) - SKIN, Left Medial Thigh                                                                        B) - SKIN, Left Thigh - Lateral                                                                     C) - SKIN, Right Knee - Medial                                                                      D) - SKIN, Left Medial Heel                                                                FINAL DIAGNOSIS 03/29/2024    Corrected                    Value:This result contains rich text formatting which cannot be displayed here.    Corrected result: Previously reported on 4/2/2024 at 1532 EDT.      03/29/2024    Final                    Value:This result contains rich text formatting which cannot be displayed here.    Amended Report 03/29/2024    Final                    Value:This result contains rich text formatting which cannot be displayed here.    Previously Final Verified Results Added    Clinical History 03/29/2024    Final                    Value:This result contains rich text formatting which cannot be displayed here.    Microscopic Description 03/29/2024    Final                    Value:This result contains rich text formatting which cannot be displayed here.    Gross Description 03/29/2024    Final                    Value:This result contains rich text formatting which cannot be displayed here.   Lab on 03/26/2024   Component Date Value Ref Range Status    Glucose 03/26/2024 89  74 - 99 mg/dL Final    Sodium 03/26/2024 138  136 - 145 mmol/L Final    Potassium 03/26/2024 4.3  3.5 - 5.3 mmol/L Final    Chloride 03/26/2024 100  98 - 107 mmol/L Final    Bicarbonate 03/26/2024 28  21 - 32 mmol/L Final    Anion Gap  03/26/2024 14  10 - 20 mmol/L Final    Urea Nitrogen 03/26/2024 24 (H)  6 - 23 mg/dL Final    Creatinine 03/26/2024 1.41 (H)  0.50 - 1.05 mg/dL Final    eGFR 03/26/2024 40 (L)  >60 mL/min/1.73m*2 Final    Calculations of estimated GFR are performed using the 2021 CKD-EPI Study Refit equation without the race variable for the IDMS-Traceable creatinine methods.  https://jasn.asnjournals.org/content/early/2021/09/22/ASN.4600270012    Calcium 03/26/2024 9.6  8.6 - 10.6 mg/dL Final       === 04/05/24 ===    CT ABDOMEN PELVIS W IV CONTRAST    - Impression -  1.  Suspected calculi in the distal right ureter measuring 1-2 mm,  with vascular phleboliths considered less likely. Right ureter is  non-dilated. Mild dilatation of the right renal pelvis.  2. Four additional non-obstructing calculi in peripheral calices of  the right kidney measuring 3 mm or smaller.  3. Apparent wall thickening of the gastric fundus is strongly favored  to be attributable to decompressed state. However a mass lesion is  not entirely excluded. Correlation with any symptoms to suggest  gastric malignancy may be helpful. If there is persistent clinical  concern, a CT could be obtained shortly after administration of oral  contrast, versus endoscopy.  4. A few additional chronic/incidental findings are similar to prior  study as detailed above including severe atherosclerotic  calcifications of the abdominal aorta, small sliding hiatal hernia,  trace left pleural effusion, and small pericardial effusion.    MACRO:  None    Signed by: Ritesh Rascon 4/6/2024 11:01 AM  Dictation workstation:   [id number]        Assessment/Plan   Diagnoses and all orders for this visit:  Kidney stones  -     POCT UA Automated manually resulted  -     Basic metabolic panel; Future  -     US renal complete; Future  -     tamsulosin (Flomax) 0.4 mg 24 hr capsule; Take 1 capsule (0.4 mg) by mouth once daily at bedtime.  -     traMADol (Ultram) 50 mg tablet; Take 1 tablet  (50 mg) by mouth 2 times a day as needed for severe pain (7 - 10) for up to 7 doses.  Calculus of right ureter  -     Basic metabolic panel; Future  -     US renal complete; Future  -     tamsulosin (Flomax) 0.4 mg 24 hr capsule; Take 1 capsule (0.4 mg) by mouth once daily at bedtime.  -     traMADol (Ultram) 50 mg tablet; Take 1 tablet (50 mg) by mouth 2 times a day as needed for severe pain (7 - 10) for up to 7 doses.    Kidney function has been trending down. If Creatinine has continued to increase may consider interventions, otherwise trial tamsulosin for medical expulsive therapy. Plan for ultrasound in a few weeks. Patient verbalized understanding of plan.        SPIKE Rojas-CNP 05/23/24 4:26 PM

## 2024-05-30 ENCOUNTER — PROCEDURE VISIT (OUTPATIENT)
Dept: DERMATOLOGY | Facility: CLINIC | Age: 74
End: 2024-05-30
Payer: MEDICARE

## 2024-05-30 VITALS — DIASTOLIC BLOOD PRESSURE: 57 MMHG | HEART RATE: 69 BPM | SYSTOLIC BLOOD PRESSURE: 89 MMHG

## 2024-05-30 DIAGNOSIS — C44.92 SQUAMOUS CELL CARCINOMA OF SKIN: Primary | ICD-10-CM

## 2024-05-30 PROCEDURE — 12032 INTMD RPR S/A/T/EXT 2.6-7.5: CPT | Performed by: DERMATOLOGY

## 2024-05-30 PROCEDURE — 17313 MOHS 1 STAGE T/A/L: CPT | Performed by: DERMATOLOGY

## 2024-05-30 NOTE — PROGRESS NOTES
Mohs Surgery Operative Note    Date of Surgery:  5/30/2024  Surgeon:  Dk Mandel MD PhD  Office Location: [address] [address]  Dept: [phone]  Dept Fax: [phone]  Referring Provider: Aimee Ochoa MD  [address] [place] [address]  [address], [address] [address]      Assessment/Plan   Pre-procedure:   Obtained informed consent: written from patient  The surgical site was identified and confirmed with the patient.     Intra-operative:   Audible time out called at : 1:19 PM 05/30/24  by: CATY FUNK RN   Verified patient name, birthdate, site, specimen bottle label & requisition.    The planned procedure(s) was again reviewed with the patient. The risks of bleeding, infection, nerve damage and scarring were reviewed. Written authorization was obtained. The patient identity, surgical site, and planned procedure(s) were verified. The provider acted as both surgeon and pathologist.     Squamous cell carcinoma of skin  Right Knee - Medial    Mohs surgery    Consent obtained: written    Universal Protocol:  Procedure explained and questions answered to patient or proxy's satisfaction: Yes    Test results available and properly labeled: Yes    Pathology report reviewed: Yes    External notes reviewed: Yes    Photo or diagram used for site identification: Yes    Site/side marked: Yes    Slide independently reviewed by Mohs surgeon: Yes    Immediately prior to procedure a time out was called: Yes    Patient identity confirmed: verbally with patient  Preparation: Patient was prepped and draped in usual sterile fashion      Anticoagulation:  Is the patient taking prescription anticoagulant and/or aspirin prescribed/recommended by a physician? Yes    Was the anticoagulation regimen changed prior to Mohs? No      Anesthesia:  Anesthesia method: local infiltration  Local anesthetic: lidocaine 1% WITH epi    Procedure Details:  Biopsy accession number:  [id number]  Date of biopsy: 3/29/2024  Frozen section biopsy performed: No    Specimen debulked: Yes (Well Differntiated Invasive Squamous Cell Carcinoma)    Pre-Op diagnosis: squamous cell carcinoma  SCC subtype: moderately differentiated (Invasive)  Surgery side: right  Surgical site (from skin exam): Right Knee - Medial  Pre-operative length (cm): 1.5  Pre-operative width (cm): 1.3  Indications for Mohs surgery: anatomic location where tissue conservation is critical  Previously treated? No      Micrographic Surgery Details:  Post-operative length (cm): 2  Post-operative width (cm): 2  Number of Mohs stages: 1    Stage 1     Comments: The patient was brought into the operating room and placed in the procedure chair in the appropriate position.  The area positive by previous biopsy was identified and confirmed with the patient. The area of clinically obvious tumor was debulked using a curette and/or scalpel as needed. An incision was made following the Mohs approach through the skin. The specimen was taken to the lab, divided into 2 piece(s) and appropriately chromacoded and processed.                   Tumor features identified on Mohs section: no tumor identified    Depth of defect: subcutaneous fat    Patient tolerance of procedure: tolerated well, no immediate complications    Reconstruction:  Was the defect reconstructed? Yes    Was reconstruction performed by the same Mohs surgeon? Yes    Setting of reconstruction: outpatient office  When was reconstruction performed? same day  Type of reconstruction: linear  Linear reconstruction: intermediate  Length of linear repair (cm): 5  Subcutaneous Layers (Deep Stitches)   Suture size:  3-0  Suture type:  Vicryl  Stitches:  Buried vertical mattress  Fine/surface layer approximation (top stitches)   Epidermal/Superficial suture size:  4-0  Epidermal/Superficial suture type:  Prolene  Stitches: simple running    Hemostasis achieved with: electrodesiccation  Outcome:  patient tolerated procedure well with no complications    Post-procedure details: sterile dressing applied and wound care instructions given    Dressing type: Hypafix, pressure dressing, Kerlix, petrolatum, Telfa pad and Coban          Intermediate Linear Repair:  Given the location and size of the defect, it was determined that an intermediate layered linear closure was required to restore normal anatomy and function. The repair is an intermediate closure as two layers of sutures were required. The defect was undermined extensively at the level of the subcutaneous plane. Standing cutaneous cones were removed using Burow's triangles. The wound edges were brought into close approximation with buried vertical mattress sutures. The remainder of the wound was then closed with epidermal top sutures.        The final repair measured 5.0 cm    Wound care was discussed, and the patient was given written post-operative wound care instructions.      The patient will follow up with Dk Mandel MD PhD as needed for any post operative problems or concerns, and will follow up with their primary dermatologist as scheduled.

## 2024-05-30 NOTE — PROGRESS NOTES
`Office Visit Note  Date: 5/30/2024  Surgeon:  Dk Mandel MD PhD  Office Location: [address] [address]  Dept: [phone]  Dept Fax: [phone]  Referring Provider: Aimee Ochoa MD  [address] [place] [address]  [address], [place] [id number]    Oneil Domingo is a 73 y.o. female who presents for the following: MOHS Surgery (Right Knee- Medial, SCC)    According to the patient, the lesion has been present for approximately greater than 1 year at the time of diagnosis.  The lesion is itchy.  The lesion has not been treated previously.    The patient does not have a pacemaker / defibrillator.  The patient does not have a heart valve / joint replacement.    The patient is on blood thinners.  The patient does not have a history of hepatitis B or C.  The patient does not have a history of HIV.  The patient does have a history of immunosuppression (e.g. organ transplantation, malignancy, medications) (Chemotherapy in 2014)     Review of Systems:  No other skin or systemic complaints other than what is documented elsewhere in the note.    MEDICAL HISTORY: clinically relevant history including significant past medical history, medications and allergies was reviewed and documented in Epic.    Objective   Well appearing patient in no apparent distress; mood and affect are within normal limits.  Vital signs: See record.  Noted on the Right Knee - Medial  Is a 1.5 x 1.3 cm scar                              The patient confirmed the identified site.    Discussion:  The nature of the diagnosis was explained. The lesion is a skin cancer.  It has a risk of local growth and distant spread. The condition is associated with sun exposure.  Warning signs of non-melanoma skin cancer discussed. Patient was instructed to perform monthly self skin examination.  We recommended that the patient have regular full skin exams given an increased risk of subsequent  skin cancers. The patient was instructed to use sun protective behaviors including use of broad spectrum sunscreens and sun protective clothing to reduce risk of skin cancers.      Risks, benefits, side effects of Mohs surgery were discussed with patient and the patient voiced understanding.  It was explained that even though the cure rate of Mohs is very high it is not 100%. Risks of surgery including but not limited to bleeding, infection, numbness, nerve damage, and scar were reviewed.  Discussion included wound care requirements, activity restrictions, likely scar outcome and time to heal.     After Mohs surgery, the defect may need to be repaired surgically and the scar may be longer than the original lesion.  Reconstruction options, risks, and benefits were reviewed including second intention healing, linear repair (4-1 ratio was explained), local flaps, skin grafts, cartilage grafts and interpolation flaps (the need for multiple surgeries was explained). Possible outcomes were reviewed including likely scar appearance, failure of flap survival, infection, bleeding and the need for revision surgery.     The pathology was reviewed, the photograph was reviewed, and the referring physician's note was reviewed.    Patient elected for Mohs surgery.

## 2024-05-31 RX ORDER — MUPIROCIN 20 MG/G
OINTMENT TOPICAL DAILY
Qty: 22 G | Refills: 2 | Status: SHIPPED | OUTPATIENT
Start: 2024-05-31 | End: 2024-06-30

## 2024-05-31 RX ORDER — CHLORHEXIDINE GLUCONATE 40 MG/ML
SOLUTION TOPICAL DAILY
Qty: 473 ML | Refills: 0 | Status: SHIPPED | OUTPATIENT
Start: 2024-05-31 | End: 2024-06-30

## 2024-06-04 ENCOUNTER — OFFICE VISIT (OUTPATIENT)
Dept: PRIMARY CARE | Facility: CLINIC | Age: 74
End: 2024-06-04
Payer: MEDICARE

## 2024-06-04 VITALS
BODY MASS INDEX: 21.57 KG/M2 | SYSTOLIC BLOOD PRESSURE: 130 MMHG | DIASTOLIC BLOOD PRESSURE: 60 MMHG | TEMPERATURE: 97.2 F | WEIGHT: 129.6 LBS

## 2024-06-04 DIAGNOSIS — J42 CHRONIC BRONCHITIS, UNSPECIFIED CHRONIC BRONCHITIS TYPE (MULTI): ICD-10-CM

## 2024-06-04 DIAGNOSIS — N20.0 KIDNEY STONE: ICD-10-CM

## 2024-06-04 DIAGNOSIS — M54.50 CHRONIC RIGHT-SIDED LOW BACK PAIN, UNSPECIFIED WHETHER SCIATICA PRESENT: ICD-10-CM

## 2024-06-04 DIAGNOSIS — R10.31 RLQ ABDOMINAL PAIN: Primary | ICD-10-CM

## 2024-06-04 DIAGNOSIS — G89.29 CHRONIC RIGHT-SIDED LOW BACK PAIN, UNSPECIFIED WHETHER SCIATICA PRESENT: ICD-10-CM

## 2024-06-04 PROCEDURE — 1160F RVW MEDS BY RX/DR IN RCRD: CPT | Performed by: FAMILY MEDICINE

## 2024-06-04 PROCEDURE — 1159F MED LIST DOCD IN RCRD: CPT | Performed by: FAMILY MEDICINE

## 2024-06-04 PROCEDURE — 3078F DIAST BP <80 MM HG: CPT | Performed by: FAMILY MEDICINE

## 2024-06-04 PROCEDURE — 99214 OFFICE O/P EST MOD 30 MIN: CPT | Performed by: FAMILY MEDICINE

## 2024-06-04 PROCEDURE — 3008F BODY MASS INDEX DOCD: CPT | Performed by: FAMILY MEDICINE

## 2024-06-04 PROCEDURE — 3075F SYST BP GE 130 - 139MM HG: CPT | Performed by: FAMILY MEDICINE

## 2024-06-04 RX ORDER — UMECLIDINIUM BROMIDE AND VILANTEROL TRIFENATATE 62.5; 25 UG/1; UG/1
POWDER RESPIRATORY (INHALATION)
Qty: 60 EACH | Refills: 3 | Status: SHIPPED | OUTPATIENT
Start: 2024-06-04

## 2024-06-04 RX ORDER — PREDNISONE 20 MG/1
TABLET ORAL
Qty: 21 TABLET | Refills: 0 | Status: SHIPPED | OUTPATIENT
Start: 2024-06-04

## 2024-06-04 NOTE — PATIENT INSTRUCTIONS
Follow up with urology.  Follow up with the ultrasound.  I ordered an MRI of the lumbar spine to look for other causes of your symptoms.  I will prescribe a course of steroids.  Return in two to three weeks for a recheck.

## 2024-06-04 NOTE — PROGRESS NOTES
Subjective   Patient ID: [id number]     Edu Domingo is a 73 y.o. female who presents for Follow-up.  HPI  She is here for a recheck.      She was last seen 4/12/24.  She had had abdominal/flank pain and a CT showed small kidney stones.  She was referred to urology.      IN the interim, she has had a squamous cell CA removed from her leg and had multiple visits with dermatology.    She saw urology within two weeks.  She gave her Flomax.  She started it May 23.  She was not sure if the stone was cause of her right flank pain.  Urology ordered a repeat US which should be coming up soon.        She still has right flank and back pain.  The right side pain is a little better but the back pain is severe.      Denies N/T/W in the legs.      Tender at the L5 paraspinal muscles on the right.      She states the flomax has helped some with the abdominal pain..  It just has not helped the back.      Objective     /60 (BP Location: Right arm, Patient Position: Sitting)   Temp 36.2 °C (97.2 °F) (Temporal)   Wt 58.8 kg (129 lb 9.6 oz)   LMP  (LMP Unknown)   BMI 21.57 kg/m²      Physical Exam  Constitutional:       Appearance: Normal appearance.   Cardiovascular:      Rate and Rhythm: Normal rate and regular rhythm.      Heart sounds: Normal heart sounds. No murmur heard.  Pulmonary:      Effort: Pulmonary effort is normal. No respiratory distress.      Breath sounds: Normal breath sounds.   Abdominal:      General: Abdomen is flat. There is no distension.      Palpations: Abdomen is soft.      Tenderness: There is abdominal tenderness. There is guarding. There is no rebound.      Comments: Tender at the right lower abdomen.  Right far lateral abdominal pain.   Musculoskeletal:      Comments: Tender at the right L5 paraspinals.    Neurological:      General: No focal deficit present.      Mental Status: She is alert and oriented to person, place, and time.         Assessment/Plan   Problem List Items Addressed This Visit     None  Visit Diagnoses       RLQ abdominal pain    -  Primary    Relevant Medications    predniSONE (Deltasone) 20 mg tablet    Kidney stone        Relevant Medications    predniSONE (Deltasone) 20 mg tablet    Chronic right-sided low back pain, unspecified whether sciatica present        Relevant Medications    predniSONE (Deltasone) 20 mg tablet    Other Relevant Orders    MR lumbar spine wo IV contrast    Chronic bronchitis, unspecified chronic bronchitis type (Multi)        Relevant Medications    Anoro Ellipta 62.5-25 mcg/actuation blister with device    predniSONE (Deltasone) 20 mg tablet        Follow up with urology.  Follow up with the ultrasound.  I ordered an MRI of the lumbar spine to look for other causes of your symptoms.  I will prescribe a course of steroids.  Return in two to three weeks for a recheck.      Abram Salmeron, DO

## 2024-06-07 ENCOUNTER — HOSPITAL ENCOUNTER (OUTPATIENT)
Dept: RADIOLOGY | Facility: HOSPITAL | Age: 74
Discharge: HOME | End: 2024-06-07
Payer: MEDICARE

## 2024-06-07 DIAGNOSIS — N20.0 KIDNEY STONES: ICD-10-CM

## 2024-06-07 DIAGNOSIS — N20.1 CALCULUS OF RIGHT URETER: ICD-10-CM

## 2024-06-07 PROCEDURE — 76770 US EXAM ABDO BACK WALL COMP: CPT

## 2024-06-07 PROCEDURE — 76770 US EXAM ABDO BACK WALL COMP: CPT | Performed by: RADIOLOGY

## 2024-06-10 ENCOUNTER — TELEPHONE (OUTPATIENT)
Dept: PRIMARY CARE | Facility: CLINIC | Age: 74
End: 2024-06-10
Payer: MEDICARE

## 2024-06-10 DIAGNOSIS — M19.91 PRIMARY OSTEOARTHRITIS, UNSPECIFIED SITE: ICD-10-CM

## 2024-06-10 RX ORDER — CELECOXIB 200 MG/1
200 CAPSULE ORAL DAILY PRN
Qty: 90 CAPSULE | Refills: 1 | Status: SHIPPED | OUTPATIENT
Start: 2024-06-10

## 2024-06-10 NOTE — ADDENDUM NOTE
Addended by: RODERICK CAMARENA on: 6/10/2024 02:36 PM     Modules accepted: Orders     Alert-The patient is alert, awake and responds to voice. The patient is oriented to time, place, and person. The triage nurse is able to obtain subjective information.

## 2024-06-10 NOTE — TELEPHONE ENCOUNTER
Pt requesting refill- med was D/C'd on 6/4/24    Celebrex 200mg as needed   [phone]  Last refill 4/9/24  Last appt 6/4/24  Next appt 6/24/24

## 2024-06-11 ENCOUNTER — TELEMEDICINE (OUTPATIENT)
Dept: UROLOGY | Facility: CLINIC | Age: 74
End: 2024-06-11
Payer: MEDICARE

## 2024-06-11 DIAGNOSIS — N20.0 RIGHT NEPHROLITHIASIS: Primary | ICD-10-CM

## 2024-06-11 PROCEDURE — 1160F RVW MEDS BY RX/DR IN RCRD: CPT | Performed by: UROLOGY

## 2024-06-11 PROCEDURE — 3008F BODY MASS INDEX DOCD: CPT | Performed by: UROLOGY

## 2024-06-11 PROCEDURE — 99214 OFFICE O/P EST MOD 30 MIN: CPT | Performed by: UROLOGY

## 2024-06-11 PROCEDURE — 1159F MED LIST DOCD IN RCRD: CPT | Performed by: UROLOGY

## 2024-06-11 RX ORDER — SODIUM CHLORIDE 9 MG/ML
100 INJECTION, SOLUTION INTRAVENOUS CONTINUOUS
OUTPATIENT
Start: 2024-06-11

## 2024-06-11 RX ORDER — ACETAMINOPHEN 325 MG/1
975 TABLET ORAL ONCE
OUTPATIENT
Start: 2024-06-11 | End: 2024-06-11

## 2024-06-11 RX ORDER — CEFAZOLIN SODIUM 2 G/100ML
2 INJECTION, SOLUTION INTRAVENOUS ONCE
OUTPATIENT
Start: 2024-06-11 | End: 2024-06-11

## 2024-06-11 ASSESSMENT — DERMATOLOGY QUALITY OF LIFE (QOL) ASSESSMENT
RATE HOW BOTHERED YOU ARE BY EFFECTS OF YOUR SKIN PROBLEMS ON YOUR ACTIVITIES (EG, GOING OUT, ACCOMPLISHING WHAT YOU WANT, WORK ACTIVITIES OR YOUR RELATIONSHIPS WITH OTHERS): 6 - ALWAYS BOTHERED
RATE HOW BOTHERED YOU ARE BY SYMPTOMS OF YOUR SKIN PROBLEM (EG, ITCHING, STINGING BURNING, HURTING OR SKIN IRRITATION): 6 - ALWAYS BOTHERED
DATE THE QUALITY-OF-LIFE ASSESSMENT WAS COMPLETED: 06/11/2024
WHAT SINGLE SKIN CONDITION LISTED BELOW IS THE PATIENT ANSWERING THE QUALITY-OF-LIFE ASSESSMENT QUESTIONS ABOUT: NONE OF THE ABOVE
RATE HOW BOTHERED YOU ARE BY SYMPTOMS OF YOUR SKIN PROBLEM (EG, ITCHING, STINGING BURNING, HURTING OR SKIN IRRITATION): 6 - ALWAYS BOTHERED
RATE HOW EMOTIONALLY BOTHERED YOU ARE BY YOUR SKIN PROBLEM (FOR EXAMPLE, WORRY, EMBARRASSMENT, FRUSTRATION): 6 - ALWAYS BOTHERED
RATE HOW EMOTIONALLY BOTHERED YOU ARE BY YOUR SKIN PROBLEM (FOR EXAMPLE, WORRY, EMBARRASSMENT, FRUSTRATION): 6 - ALWAYS BOTHERED
RATE HOW BOTHERED YOU ARE BY SYMPTOMS OF YOUR SKIN PROBLEM (EG, ITCHING, STINGING BURNING, HURTING OR SKIN IRRITATION): 6 - ALWAYS BOTHERED
DATE THE QUALITY-OF-LIFE ASSESSMENT WAS COMPLETED: 67002
WHAT SINGLE SKIN CONDITION LISTED BELOW IS THE PATIENT ANSWERING THE QUALITY-OF-LIFE ASSESSMENT QUESTIONS ABOUT: NONE OF THE ABOVE
RATE HOW EMOTIONALLY BOTHERED YOU ARE BY YOUR SKIN PROBLEM (FOR EXAMPLE, WORRY, EMBARRASSMENT, FRUSTRATION): 6 - ALWAYS BOTHERED
RATE HOW BOTHERED YOU ARE BY EFFECTS OF YOUR SKIN PROBLEMS ON YOUR ACTIVITIES (EG, GOING OUT, ACCOMPLISHING WHAT YOU WANT, WORK ACTIVITIES OR YOUR RELATIONSHIPS WITH OTHERS): 6 - ALWAYS BOTHERED
RATE HOW BOTHERED YOU ARE BY SYMPTOMS OF YOUR SKIN PROBLEM (EG, ITCHING, STINGING BURNING, HURTING OR SKIN IRRITATION): 6 - ALWAYS BOTHERED
WHAT SINGLE SKIN CONDITION LISTED BELOW IS THE PATIENT ANSWERING THE QUALITY-OF-LIFE ASSESSMENT QUESTIONS ABOUT: NONE OF THE ABOVE
RATE HOW EMOTIONALLY BOTHERED YOU ARE BY YOUR SKIN PROBLEM (FOR EXAMPLE, WORRY, EMBARRASSMENT, FRUSTRATION): 6 - ALWAYS BOTHERED
RATE HOW BOTHERED YOU ARE BY EFFECTS OF YOUR SKIN PROBLEMS ON YOUR ACTIVITIES (EG, GOING OUT, ACCOMPLISHING WHAT YOU WANT, WORK ACTIVITIES OR YOUR RELATIONSHIPS WITH OTHERS): 6 - ALWAYS BOTHERED
DATE THE QUALITY-OF-LIFE ASSESSMENT WAS COMPLETED: 67002
RATE HOW BOTHERED YOU ARE BY EFFECTS OF YOUR SKIN PROBLEMS ON YOUR ACTIVITIES (EG, GOING OUT, ACCOMPLISHING WHAT YOU WANT, WORK ACTIVITIES OR YOUR RELATIONSHIPS WITH OTHERS): 6 - ALWAYS BOTHERED
WHAT SINGLE SKIN CONDITION LISTED BELOW IS THE PATIENT ANSWERING THE QUALITY-OF-LIFE ASSESSMENT QUESTIONS ABOUT: NONE OF THE ABOVE
DATE THE QUALITY-OF-LIFE ASSESSMENT WAS COMPLETED: 06/11/2024

## 2024-06-11 NOTE — PROGRESS NOTES
History Of Present Illness  73-year-old female seeing me regarding possible nephrolithiasis, right flank pain  PMH: Hypertension, hyperlipidemia, prior STEMI, renovascular hypertension (EF 55% 2022), COPD, prior TIA, smoking, overactive bladder on Myrbetriq, history of lung cancer with prior resection, anxiety/depression    Patient has been seen by my colleague, Margot Espinoza CNP for possible right-sided nephrolithiasis.  Patient reports in early April she had acute onset right lower back pain radiating towards her right lower quadrant.  Pain has been persistent, occasionally severe with the shooting pain which will bring her to her knees.  In between shooting pain episodes, pain is quite tolerable.  No fevers, chills, nausea, vomiting.    I reviewed her CT scan from April as well as her most recent ultrasound.  CT scan reveals possible small distal ureteral calculi, somewhat difficult to tell in this area whether these are calculi or phleboliths but she has mild upstream hydronephrosis and some nonobstructing stones in her right kidney 2 to 3 mm in size.  The renal ultrasound from June 7 reveals mild hydroureteronephrosis on the right side.  Creatinine 1.3, baseline  UA 5/23/2024 nitrite and leuk esterase negative, pH 5.5    Past Medical History  She has a past medical history of Anxiety disorder, unspecified, Calculus of kidney, Major depressive disorder, recurrent, in partial remission (CMS-HCC) (09/21/2021), Old myocardial infarction, Other conditions influencing health status, Other transient cerebral ischemic attacks and related syndromes, Personal history of other diseases of the circulatory system, Personal history of other malignant neoplasm of bronchus and lung, and Personal history of other specified conditions (01/10/2019).    Surgical History  She has a past surgical history that includes Other surgical history (08/20/2013); Other surgical history (08/20/2013); Lung lobectomy (04/22/2014); Other  surgical history (04/22/2014); CT angio head w and wo IV contrast (11/26/2021); CT angio neck (11/26/2021); and CT angio neck (11/23/2022).     Social History  She reports that she has been smoking cigarettes. She has never used smokeless tobacco. She reports that she does not currently use alcohol. She reports that she does not currently use drugs.    Family History  Family History   Problem Relation Name Age of Onset    Heart disease Mother      Rectal cancer Mother      Other (cardiac disorder) Mother      Prostate cancer Father      Ovarian cancer Mother's Sister      Breast cancer Mother's Sister      Diabetes Maternal Grandmother      Diabetes Maternal Grandfather      Cancer Cousin          Allergies  Morphine, Myrbetriq [mirabegron], Oxycodone-acetaminophen, and Sulfa (sulfonamide antibiotics)    ROS: 12 system review was completed and is negative with the exception of those signs and symptoms noted in the history of present illness: A 12 system review was completed and is negative with the exception of those signs and symptoms noted in the history of present illness.     Exam:  Virtual visit conducted, no abnormalities detected     Last Recorded Vitals  There were no vitals taken for this visit.    Lab Results   Component Value Date    CREATININE 1.30 (H) 05/23/2024    HGB 12.7 03/11/2024         ASSESSMENT/PLAN:  # Right-sided nephrolithiasis  -Given the persistent right-sided symptoms, hydroureteronephrosis on ultrasound, and questionable stones in the ureter I recommended stone treatment.  She has now had 2 months to pass the small stones without success.  She understands there is a small chance that she does not have stones in the distal ureter.  Regardless, we can clean out the small nonobstructing stones from her right kidney.  -Plan for right ureteroscopy, laser lithotripsy.  Described procedure and risks today, including hematuria, urinary tract infection, ureteral trauma, stent discomfort  -Plan  for preoperative urine culture, CBC, BMP, UA  -Stent removal 4 to 7 days postop  -She will follow-up with me postop for stone prevention    Osmar Monroe MD

## 2024-06-12 ENCOUNTER — APPOINTMENT (OUTPATIENT)
Dept: NEUROLOGY | Facility: CLINIC | Age: 74
End: 2024-06-12
Payer: MEDICARE

## 2024-06-12 ENCOUNTER — TELEPHONE (OUTPATIENT)
Dept: PRIMARY CARE | Facility: CLINIC | Age: 74
End: 2024-06-12

## 2024-06-12 VITALS
TEMPERATURE: 96.6 F | HEART RATE: 71 BPM | DIASTOLIC BLOOD PRESSURE: 80 MMHG | SYSTOLIC BLOOD PRESSURE: 102 MMHG | HEIGHT: 65 IN | WEIGHT: 129 LBS | BODY MASS INDEX: 21.49 KG/M2

## 2024-06-12 DIAGNOSIS — R55 POSTURAL DIZZINESS WITH PRESYNCOPE: Primary | ICD-10-CM

## 2024-06-12 DIAGNOSIS — R25.1 TREMOR: ICD-10-CM

## 2024-06-12 DIAGNOSIS — R42 POSTURAL DIZZINESS WITH PRESYNCOPE: Primary | ICD-10-CM

## 2024-06-12 PROCEDURE — 99213 OFFICE O/P EST LOW 20 MIN: CPT | Performed by: STUDENT IN AN ORGANIZED HEALTH CARE EDUCATION/TRAINING PROGRAM

## 2024-06-12 PROCEDURE — 3079F DIAST BP 80-89 MM HG: CPT | Performed by: STUDENT IN AN ORGANIZED HEALTH CARE EDUCATION/TRAINING PROGRAM

## 2024-06-12 PROCEDURE — 3008F BODY MASS INDEX DOCD: CPT | Performed by: STUDENT IN AN ORGANIZED HEALTH CARE EDUCATION/TRAINING PROGRAM

## 2024-06-12 PROCEDURE — 4004F PT TOBACCO SCREEN RCVD TLK: CPT | Performed by: STUDENT IN AN ORGANIZED HEALTH CARE EDUCATION/TRAINING PROGRAM

## 2024-06-12 PROCEDURE — 3074F SYST BP LT 130 MM HG: CPT | Performed by: STUDENT IN AN ORGANIZED HEALTH CARE EDUCATION/TRAINING PROGRAM

## 2024-06-12 ASSESSMENT — PATIENT HEALTH QUESTIONNAIRE - PHQ9
1. LITTLE INTEREST OR PLEASURE IN DOING THINGS: NOT AT ALL
2. FEELING DOWN, DEPRESSED OR HOPELESS: NOT AT ALL
SUM OF ALL RESPONSES TO PHQ9 QUESTIONS 1 & 2: 0

## 2024-06-12 ASSESSMENT — LIFESTYLE VARIABLES
HOW OFTEN DO YOU HAVE A DRINK CONTAINING ALCOHOL: NEVER
SKIP TO QUESTIONS 9-10: 1
HOW MANY STANDARD DRINKS CONTAINING ALCOHOL DO YOU HAVE ON A TYPICAL DAY: PATIENT DOES NOT DRINK
AUDIT-C TOTAL SCORE: 0
HOW OFTEN DO YOU HAVE SIX OR MORE DRINKS ON ONE OCCASION: NEVER

## 2024-06-12 NOTE — TELEPHONE ENCOUNTER
Pt is calling she is having a procedure on 07/05/24 with Dr. Monroe and is requesting Tramadol 50mg take one tab twice a day prn. Are you able to send this to her pharm [facility] # [phone].

## 2024-06-12 NOTE — PROGRESS NOTES
Subjective   Edu Domingo is a 73 y.o.   female who presets for follow up.     Last seen 12/6/2023 for episodes of LOC.     Since last visit, pt has not had any further episode of dizziness. She has learned to move slowly which has helped.     She continues to have R hand tremor. Still has R hand tremor that is more noticeable when holding a cup or phone. Not any more worse since last visit. No slowness or stiffness in movements. No change in facial expressions.     She has been undergoing skin cancer removals and has upcoming additional skin cancer removal coming up, along with bladder surgery coming up.         Objective   Neurological Exam  Mental Status  Awake, alert and oriented to person, place and time.    Cranial Nerves  CN II: Visual fields full to confrontation.  CN III, IV, VI: Extraocular movements intact bilaterally.  CN V: Facial sensation is normal.  CN VII: Full and symmetric facial movement.    Motor  Normal muscle bulk throughout. No fasciculations present. Increased muscle tone. Mildly; only noted on distraction. Strength is 5/5 throughout all four extremities.    Coordination    BECK intact, no slowing or dysrhythmia in BUE or BLE.    Gait  Casual gait is normal including stance, stride, and arm swing.    Physical Exam  Eyes:      Extraocular Movements: Extraocular movements intact.   Neurological:      Motor: Motor strength is normal.      I personally reviewed laboratory, radiographic, and medical studies which were pertinent for negative REBECCA, rheumatoid factor, cbc. Mild elevation in creatinine noted.    Assessment/Plan       1. Syncope: had couple episodes that was initially concerning for position change-related presyncope; and possible additional vasovagal episode. Never had tilt table testing done but pt's sx improved after behavioral adaptation, I.e. moving slowly. Will monitor.     2. R hand tremor: by hx, more action related (holding) than resting. Not reproducible on exam. Mildly  increased tone on distraction but no significant bradykinesia noted. Continue to monitor.      Follow-up in 1 year.      Patient seen, evaluated and discussed with attending physician, Dr. Harman.    Monie Escoto PGY5  Vascular Neurology Fellow

## 2024-06-13 ENCOUNTER — APPOINTMENT (OUTPATIENT)
Dept: DERMATOLOGY | Facility: CLINIC | Age: 74
End: 2024-06-13
Payer: MEDICARE

## 2024-06-13 DIAGNOSIS — N20.0 KIDNEY STONES: ICD-10-CM

## 2024-06-13 DIAGNOSIS — D04.72 SQUAMOUS CELL CARCINOMA IN SITU (SCCIS) OF LEFT FOOT: ICD-10-CM

## 2024-06-13 DIAGNOSIS — Z48.02 VISIT FOR SUTURE REMOVAL: ICD-10-CM

## 2024-06-13 DIAGNOSIS — N20.1 CALCULUS OF RIGHT URETER: ICD-10-CM

## 2024-06-13 RX ORDER — TRAMADOL HYDROCHLORIDE 50 MG/1
50 TABLET ORAL 2 TIMES DAILY PRN
Qty: 10 TABLET | Refills: 0 | Status: SHIPPED | OUTPATIENT
Start: 2024-06-13

## 2024-06-14 NOTE — PROGRESS NOTES
Patient ID: Edu Domingo is a 73 y.o. female.  Primary Care Provider: DO Oneil Aviles    Referred by Dr. 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    21 R vulvar biopsy:  DAMIEN-3 with HPV cytopathic effect.    1/ PPD smoker x 30 years       PMH:   Hypertension, overactive bladder, hypercholesterolemia, anxiety. History of ?lung cancer in  (underwent LL lobectomy followed by chemo)- per patient they did not think lung was the primary. History of skin cancer on legs.    PSH: Left lower lobectomy , right arm ganglion cyst removal, lesions excised on legs for skin cancer     Family history: Mother with rectal cancer, father with ?pancreatic cancer, maternal aunt breast, maternal aunt cervix cancer    ,  x2. No abnormal paps last in . Menopause in .       Objective    There were no vitals taken for this visit.       Interval history: Patient is a 73 year old female s/p R wide excision of anterior vulvar/thigh lesion, R wide excision of perianal lesion, and R thigh punch biopsy on 2021 for VIN3.  Been using Estrace for vaginal atrophy.    LLQ pain last visit, ultrasound was negative.      Physical Exam:    Constitutional: Doing well. ROSANA  Eyes: PERRL  ENMT: Moist mucus membranes  Head/Neck: Supple. Symmetrical  Cardiovascular: Regular, rate and rhythm. 2+ equal pulses of the extremities  Respiratory/Thorax: CTA. RRR. Chest rise symmetrical.  Gastrointestinal: Non-distended, soft, non-tender  Genitourinary:   Cervical os visualized with no lesions, no CMT, small mobile uterus, no adnexal masses noted. Smooth vaginal walls.  Vulva with no lesions noted, well healed incision.   Musculoskeletal: ROM intact, no joint swelling, normal strength  Extremities: No edema  Neurological: Alert and oriented x 3. Pleasant and cooperative.  Lymphatic: No lymphadenopathy. No lymphedema  Psychological: Appropriate mood and behavior  Skin: Warm  and dry, no lesions, no rashes    A complete review of systems was performed and all systems were normal except what is noted in the interval history.          Assessment/Plan  Patient is a 73 year old female s/p R wide excision of anterior vulvar/thigh lesion, R wide excision of perianal lesion, and R thigh punch biopsy on 6/2/2021 for VIN3.      PLAN:

## 2024-06-17 ENCOUNTER — APPOINTMENT (OUTPATIENT)
Dept: DERMATOLOGY | Facility: CLINIC | Age: 74
End: 2024-06-17
Payer: MEDICARE

## 2024-06-17 DIAGNOSIS — L57.0 ACTINIC KERATOSES: Primary | ICD-10-CM

## 2024-06-17 DIAGNOSIS — Z85.828 PERSONAL HISTORY OF SKIN CANCER: ICD-10-CM

## 2024-06-17 DIAGNOSIS — L57.8 OTHER SKIN CHANGES DUE TO CHRONIC EXPOSURE TO NONIONIZING RADIATION: ICD-10-CM

## 2024-06-17 DIAGNOSIS — L81.4 LENTIGO: ICD-10-CM

## 2024-06-17 DIAGNOSIS — L82.1 SEBORRHEIC KERATOSIS: ICD-10-CM

## 2024-06-17 DIAGNOSIS — D18.01 HEMANGIOMA OF SKIN: ICD-10-CM

## 2024-06-17 PROCEDURE — 17004 DESTROY PREMAL LESIONS 15/>: CPT | Performed by: STUDENT IN AN ORGANIZED HEALTH CARE EDUCATION/TRAINING PROGRAM

## 2024-06-17 PROCEDURE — 1159F MED LIST DOCD IN RCRD: CPT | Performed by: STUDENT IN AN ORGANIZED HEALTH CARE EDUCATION/TRAINING PROGRAM

## 2024-06-17 PROCEDURE — 3008F BODY MASS INDEX DOCD: CPT | Performed by: STUDENT IN AN ORGANIZED HEALTH CARE EDUCATION/TRAINING PROGRAM

## 2024-06-17 PROCEDURE — 99213 OFFICE O/P EST LOW 20 MIN: CPT | Performed by: STUDENT IN AN ORGANIZED HEALTH CARE EDUCATION/TRAINING PROGRAM

## 2024-06-17 NOTE — PROGRESS NOTES
Oneil Domingo is a 73 y.o. female who presents for the following: Skin Check (+ h/o SCC).     Review of Systems:  No other skin or systemic complaints other than what is documented elsewhere in the note.    The following portions of the chart were reviewed this encounter and updated as appropriate:          Skin Cancer History  Biopsy Date Type Location Status   3/29/24 SCC in Situ Left Medial Thigh Treatment Complete  6/13/24   3/29/24 SCC Right Knee - Medial Treatment Complete  6/13/24   3/29/24 SCC in Situ Left Thigh - Lateral Treatment Complete  6/13/24   3/29/24 SCC in Situ Left Medial Heel Treatment Complete  6/13/24       Specialty Problems          Dermatology Problems    Actinic keratosis    Hemangioma of skin and subcutaneous tissue    Neoplasm of uncertain behavior of skin    Other melanin hyperpigmentation    Other seborrheic keratosis    Personal history of other malignant neoplasm of skin    Squamous cell carcinoma of skin of lower limb, including hip    Back skin lesion    Benign skin cyst    Hives    Multiple dysplastic nevi    Pain due to onychomycosis of nail    Skin lesion of face    Skin lesion of lower extremity    Squamous cell skin cancer        Objective   Well appearing patient in no apparent distress; mood and affect are within normal limits.    A focused skin examination was performed. All findings within normal limits unless otherwise noted below.    Assessment/Plan   1. Actinic keratoses (16)  Left Ankle - Anterior, Left Lower Leg - Anterior (3), Left Thigh - Anterior, Right Ankle - Anterior, Right Breast (2), Right Foot - Anterior, Right Lower Leg - Anterior (5), Right Thigh - Anterior (2)  Erythematous gritty macule(s)    Lesions are due to chronic, cumulative sun damage over time and are pre-cancerous. They have a risk of developing into squamous cell carcinoma and therefore treatment recommendations were offered and discussed with the patient. Discussed LN2 & topical  chemotherapy creams, risks and benefits of each. The risks and benefits of LN2 were reviewed including incomplete removal, crusting, blister hypo and/or hyperpigmentation, scarring. The patient elected for LN2 today.    Destr of lesion - Left Ankle - Anterior, Left Lower Leg - Anterior (3), Left Thigh - Anterior, Right Ankle - Anterior, Right Breast (2), Right Foot - Anterior, Right Lower Leg - Anterior (5), Right Thigh - Anterior (2)  Complexity: simple    Destruction method: cryotherapy    Informed consent: discussed and consent obtained    Lesion destroyed using liquid nitrogen: Yes    Cryotherapy cycles:  1  Outcome: patient tolerated procedure well with no complications    Post-procedure details: wound care instructions given      2. Other skin changes due to chronic exposure to nonionizing radiation  Mottled pigmentation, telangiectasias and brown reticular macules in sun exposed areas on the face, extremities, trunk    The risk of chronic, cumulative sun damage and risk of development of skin cancer was reviewed with the patient today. Discussed important of sun protection with sun protective clothing and/or broad spectrum sunscreen spf 30 or above.  Warning signs of skin cancer were reviewed. Patient to contact office should they notice any new or changing pre-existing skin lesion.    Related Procedures  Follow Up In Dermatology - Established Patient  Follow Up In Dermatology - Established Patient    3. Personal history of skin cancer  Scars at site of prior procedures    There is no evidence of recurrence on clinical examination today, reassurance was provided to the patient. Discussed that hx of skin cancer increases risk of developing future skin cancer and total body skin exams are warranted every 6 months    4. Seborrheic keratosis  Stuck on verrucous, tan-brown papules and plaques.      The benign nature of these skin lesions were reviewed with the patient today and reassurance was provided. Patient  advised to return for f/u if the lesions change in size, shape, color, become painful, tender, itch or bleed.    5. Hemangioma of skin  Bright red papules    Discussed benign nature of condition, reassured. Reviewed warning signs of skin cancer with patient.    6. Lentigo  Scattered tan macules in sun-exposed areas.    Benign nature of these skin lesions reviewed and relation to sun exposure discussed. Reassurance provided. Reviewed warning signs of skin cancer.    Numerous Aks vs DSAP  If not resolved with LN2 consider biopsy    FU 6 months tbse

## 2024-06-18 ENCOUNTER — APPOINTMENT (OUTPATIENT)
Dept: RADIOLOGY | Facility: HOSPITAL | Age: 74
End: 2024-06-18
Payer: MEDICARE

## 2024-06-18 ENCOUNTER — APPOINTMENT (OUTPATIENT)
Dept: GYNECOLOGIC ONCOLOGY | Facility: CLINIC | Age: 74
End: 2024-06-18
Payer: MEDICARE

## 2024-06-24 ENCOUNTER — APPOINTMENT (OUTPATIENT)
Dept: PRIMARY CARE | Facility: CLINIC | Age: 74
End: 2024-06-24
Payer: MEDICARE

## 2024-06-24 VITALS
WEIGHT: 126 LBS | SYSTOLIC BLOOD PRESSURE: 120 MMHG | BODY MASS INDEX: 20.97 KG/M2 | TEMPERATURE: 97.3 F | DIASTOLIC BLOOD PRESSURE: 64 MMHG

## 2024-06-24 DIAGNOSIS — N20.0 KIDNEY STONES: ICD-10-CM

## 2024-06-24 DIAGNOSIS — R60.9 PERIPHERAL EDEMA: ICD-10-CM

## 2024-06-24 DIAGNOSIS — N20.1 URETEROLITHIASIS: Primary | ICD-10-CM

## 2024-06-24 DIAGNOSIS — N20.1 CALCULUS OF RIGHT URETER: ICD-10-CM

## 2024-06-24 DIAGNOSIS — R21 RASH: ICD-10-CM

## 2024-06-24 PROCEDURE — 3078F DIAST BP <80 MM HG: CPT | Performed by: FAMILY MEDICINE

## 2024-06-24 PROCEDURE — 1123F ACP DISCUSS/DSCN MKR DOCD: CPT | Performed by: FAMILY MEDICINE

## 2024-06-24 PROCEDURE — 4004F PT TOBACCO SCREEN RCVD TLK: CPT | Performed by: FAMILY MEDICINE

## 2024-06-24 PROCEDURE — 1159F MED LIST DOCD IN RCRD: CPT | Performed by: FAMILY MEDICINE

## 2024-06-24 PROCEDURE — 99214 OFFICE O/P EST MOD 30 MIN: CPT | Performed by: FAMILY MEDICINE

## 2024-06-24 PROCEDURE — 3008F BODY MASS INDEX DOCD: CPT | Performed by: FAMILY MEDICINE

## 2024-06-24 PROCEDURE — 3074F SYST BP LT 130 MM HG: CPT | Performed by: FAMILY MEDICINE

## 2024-06-24 PROCEDURE — 1160F RVW MEDS BY RX/DR IN RCRD: CPT | Performed by: FAMILY MEDICINE

## 2024-06-24 RX ORDER — FUROSEMIDE 20 MG/1
20 TABLET ORAL DAILY
Qty: 30 TABLET | Refills: 0 | Status: SHIPPED | OUTPATIENT
Start: 2024-06-24 | End: 2025-06-24

## 2024-06-24 RX ORDER — HYDROCORTISONE 25 MG/G
CREAM TOPICAL 2 TIMES DAILY PRN
Qty: 30 G | Refills: 0 | Status: SHIPPED | OUTPATIENT
Start: 2024-06-24 | End: 2025-06-24

## 2024-06-24 RX ORDER — TAMSULOSIN HYDROCHLORIDE 0.4 MG/1
0.4 CAPSULE ORAL NIGHTLY
Qty: 30 CAPSULE | Refills: 0 | Status: SHIPPED | OUTPATIENT
Start: 2024-06-24 | End: 2024-07-24

## 2024-06-24 ASSESSMENT — PATIENT HEALTH QUESTIONNAIRE - PHQ9
1. LITTLE INTEREST OR PLEASURE IN DOING THINGS: NOT AT ALL
2. FEELING DOWN, DEPRESSED OR HOPELESS: NOT AT ALL
SUM OF ALL RESPONSES TO PHQ9 QUESTIONS 1 AND 2: 0

## 2024-06-24 NOTE — PATIENT INSTRUCTIONS
Follow as scheduled with the planned ureteroscopy on 7/5/24 for your kidney stones.  I refilled your flomax.      Return if this worsens or if it persists after your ureteroscopy on 7/5/24.

## 2024-06-24 NOTE — PROGRESS NOTES
"Subjective   Patient ID: [id number]     Edu Domingo is a 73 y.o. female who presents for Follow-up and Med Refill.  HPI  She is here for a recheck and refills on medication.      He recently had kidney stones treated by urology.  She is following Dr Monroe who has recommended right ureteroscopy to retrieve the small stones that are not passing.    The ureteroscopy is scheduled July 5th.  The back pain is worse.  The right flank pain is \"maybe a little better\" but still there.  No blood in the urine.  No fever.      She has one more Flomax to take.        No dysuria.  No increased frequency of urination.  She is drinking a lot of water.    The steroids did not provide any benefit, even temporarily.       Objective     /64 (BP Location: Right arm, Patient Position: Sitting)   Temp 36.3 °C (97.3 °F) (Skin)   Wt 57.2 kg (126 lb)   LMP  (LMP Unknown)   BMI 20.97 kg/m²      Physical Exam  Cardiovascular:      Rate and Rhythm: Normal rate and regular rhythm.      Heart sounds: Normal heart sounds.   Pulmonary:      Effort: Pulmonary effort is normal. No respiratory distress.      Breath sounds: Normal breath sounds.   Abdominal:      General: Abdomen is flat.      Palpations: Abdomen is soft.      Tenderness: There is abdominal tenderness (mild dull ache in the right lower abdomen.).   Musculoskeletal:      Comments: Tender at the right mid lumbar paraspinals--L3-4   Neurological:      Mental Status: She is alert.         Assessment/Plan   Problem List Items Addressed This Visit    None  Visit Diagnoses       Ureterolithiasis    -  Primary    Peripheral edema        Relevant Medications    furosemide (Lasix) 20 mg tablet    Kidney stones        Relevant Medications    tamsulosin (Flomax) 0.4 mg 24 hr capsule    Calculus of right ureter        Relevant Medications    tamsulosin (Flomax) 0.4 mg 24 hr capsule    Rash        Relevant Medications    hydrocortisone 2.5 % cream        Follow as scheduled with the " planned ureteroscopy on 7/5/24 for your kidney stones.  I refilled your flomax.      Return if this worsens or if it persists after your ureteroscopy on 7/5/24.      Abram Salmeron, DO

## 2024-06-25 ENCOUNTER — PRE-ADMISSION TESTING (OUTPATIENT)
Dept: PREADMISSION TESTING | Facility: HOSPITAL | Age: 74
End: 2024-06-25
Payer: MEDICARE

## 2024-06-25 ENCOUNTER — LAB (OUTPATIENT)
Dept: LAB | Facility: LAB | Age: 74
End: 2024-06-25
Payer: MEDICARE

## 2024-06-25 VITALS
OXYGEN SATURATION: 94 % | HEART RATE: 66 BPM | RESPIRATION RATE: 18 BRPM | TEMPERATURE: 97.2 F | HEIGHT: 65 IN | DIASTOLIC BLOOD PRESSURE: 43 MMHG | SYSTOLIC BLOOD PRESSURE: 93 MMHG | WEIGHT: 125.44 LBS | BODY MASS INDEX: 20.9 KG/M2

## 2024-06-25 DIAGNOSIS — N20.0 RIGHT NEPHROLITHIASIS: ICD-10-CM

## 2024-06-25 DIAGNOSIS — Z01.818 PREOP EXAMINATION: Primary | ICD-10-CM

## 2024-06-25 LAB
ANION GAP SERPL CALC-SCNC: 13 MMOL/L (ref 10–20)
APPEARANCE UR: CLEAR
BILIRUB UR STRIP.AUTO-MCNC: NEGATIVE MG/DL
BUN SERPL-MCNC: 21 MG/DL (ref 6–23)
CALCIUM SERPL-MCNC: 9.2 MG/DL (ref 8.6–10.3)
CHLORIDE SERPL-SCNC: 102 MMOL/L (ref 98–107)
CO2 SERPL-SCNC: 28 MMOL/L (ref 21–32)
COLOR UR: NORMAL
CREAT SERPL-MCNC: 1.21 MG/DL (ref 0.5–1.05)
EGFRCR SERPLBLD CKD-EPI 2021: 48 ML/MIN/1.73M*2
ERYTHROCYTE [DISTWIDTH] IN BLOOD BY AUTOMATED COUNT: 15.1 % (ref 11.5–14.5)
GLUCOSE SERPL-MCNC: 98 MG/DL (ref 74–99)
GLUCOSE UR STRIP.AUTO-MCNC: NORMAL MG/DL
HCT VFR BLD AUTO: 40.4 % (ref 36–46)
HGB BLD-MCNC: 13 G/DL (ref 12–16)
HOLD SPECIMEN: NORMAL
KETONES UR STRIP.AUTO-MCNC: NEGATIVE MG/DL
LEUKOCYTE ESTERASE UR QL STRIP.AUTO: NEGATIVE
MCH RBC QN AUTO: 29.7 PG (ref 26–34)
MCHC RBC AUTO-ENTMCNC: 32.2 G/DL (ref 32–36)
MCV RBC AUTO: 92 FL (ref 80–100)
NITRITE UR QL STRIP.AUTO: NEGATIVE
NRBC BLD-RTO: 0 /100 WBCS (ref 0–0)
PH UR STRIP.AUTO: 6.5 [PH]
PLATELET # BLD AUTO: 209 X10*3/UL (ref 150–450)
POTASSIUM SERPL-SCNC: 4.5 MMOL/L (ref 3.5–5.3)
PROT UR STRIP.AUTO-MCNC: NEGATIVE MG/DL
RBC # BLD AUTO: 4.37 X10*6/UL (ref 4–5.2)
RBC # UR STRIP.AUTO: NEGATIVE /UL
SODIUM SERPL-SCNC: 138 MMOL/L (ref 136–145)
SP GR UR STRIP.AUTO: 1.01
UROBILINOGEN UR STRIP.AUTO-MCNC: NORMAL MG/DL
WBC # BLD AUTO: 8.8 X10*3/UL (ref 4.4–11.3)

## 2024-06-25 PROCEDURE — 87086 URINE CULTURE/COLONY COUNT: CPT

## 2024-06-25 PROCEDURE — 36415 COLL VENOUS BLD VENIPUNCTURE: CPT

## 2024-06-25 PROCEDURE — 80048 BASIC METABOLIC PNL TOTAL CA: CPT

## 2024-06-25 PROCEDURE — 81003 URINALYSIS AUTO W/O SCOPE: CPT

## 2024-06-25 PROCEDURE — 85027 COMPLETE CBC AUTOMATED: CPT

## 2024-06-25 ASSESSMENT — DUKE ACTIVITY SCORE INDEX (DASI)
CAN YOU TAKE CARE OF YOURSELF (EAT, DRESS, BATHE, OR USE TOILET): YES
CAN YOU DO MODERATE WORK AROUND THE HOUSE LIKE VACUUMING, SWEEPING FLOORS OR CARRYING GROCERIES: YES
DASI METS SCORE: 6.7
TOTAL_SCORE: 32.2
CAN YOU DO HEAVY WORK AROUND THE HOUSE LIKE SCRUBBING FLOORS OR LIFTING AND MOVING HEAVY FURNITURE: YES
CAN YOU DO LIGHT WORK AROUND THE HOUSE LIKE DUSTING OR WASHING DISHES: YES
CAN YOU RUN A SHORT DISTANCE: NO
CAN YOU WALK A BLOCK OR TWO ON LEVEL GROUND: YES
CAN YOU PARTICIPATE IN STRENOUS SPORTS LIKE SWIMMING, SINGLES TENNIS, FOOTBALL, BASKETBALL, OR SKIING: NO
CAN YOU DO YARD WORK LIKE RAKING LEAVES, WEEDING OR PUSHING A MOWER: NO
CAN YOU WALK INDOORS, SUCH AS AROUND YOUR HOUSE: YES
CAN YOU HAVE SEXUAL RELATIONS: YES
CAN YOU PARTICIPATE IN MODERATE RECREATIONAL ACTIVITIES LIKE GOLF, BOWLING, DANCING, DOUBLES TENNIS OR THROWING A BASEBALL OR FOOTBALL: NO
CAN YOU CLIMB A FLIGHT OF STAIRS OR WALK UP A HILL: YES

## 2024-06-25 ASSESSMENT — ACTIVITIES OF DAILY LIVING (ADL): ADL_SCORE: 0

## 2024-06-25 ASSESSMENT — PAIN SCALES - GENERAL: PAINLEVEL_OUTOF10: 0 - NO PAIN

## 2024-06-25 ASSESSMENT — LIFESTYLE VARIABLES: SMOKING_STATUS: SMOKER

## 2024-06-25 ASSESSMENT — PAIN - FUNCTIONAL ASSESSMENT: PAIN_FUNCTIONAL_ASSESSMENT: 0-10

## 2024-06-25 NOTE — PREPROCEDURE INSTRUCTIONS
Medication List            Accurate as of June 25, 2024  9:57 AM. Always use your most recent med list.                albuterol 90 mcg/actuation inhaler  INHALE 2 PUFFS BY MOUTH AS INSTRUCTED EVERY 4 HOURS AS NEEDED FOR WHEEZING / SHORTNESS OF BREATH  Medication Adjustments for Surgery: Take morning of surgery with sip of water, no other fluids     ALPRAZolam 0.5 mg tablet  Commonly known as: Xanax  Take 1 tablet (0.5 mg) by mouth 3 times a day as needed for anxiety.  Medication Adjustments for Surgery: Take morning of surgery with sip of water, no other fluids     amLODIPine 5 mg tablet  Commonly known as: Norvasc  Take 1 tablet (5 mg) by mouth once daily.  Medication Adjustments for Surgery: Take morning of surgery with sip of water, no other fluids     ammonium lactate 12 % lotion  Commonly known as: Lac-Hydrin  Apply topically if needed for dry skin.  Medication Adjustments for Surgery: Stop 1 day before surgery     Anoro Ellipta 62.5-25 mcg/actuation blister with device  Generic drug: umeclidinium-vilanteroL  INHALE 1 PUFF BY MOUTH AS INSTRUCTED ONCE DAILY.  Medication Adjustments for Surgery: Take morning of surgery with sip of water, no other fluids     aspirin 81 mg EC tablet  Medication Adjustments for Surgery: Stop 7 days before surgery     atorvastatin 80 mg tablet  Commonly known as: Lipitor  Take 1 tablet (80 mg) by mouth once daily.  Medication Adjustments for Surgery: Take morning of surgery with sip of water, no other fluids     carvedilol 25 mg tablet  Commonly known as: Coreg  Take 1 tablet (25 mg) by mouth 2 times a day with meals.  Medication Adjustments for Surgery: Take morning of surgery with sip of water, no other fluids     celecoxib 200 mg capsule  Commonly known as: CeleBREX  Take 1 capsule (200 mg) by mouth once daily as needed for moderate pain (4 - 6).  Medication Adjustments for Surgery: Stop 7 days before surgery     chlorhexidine 4 % external liquid  Commonly known as:  Hibiclens  Apply topically once daily. Cleanse surgical site and extend to foot daily in the shower for 30 days.  Medication Adjustments for Surgery: Other (Comment)  Notes to patient: As ordered     cholecalciferol 50 MCG (2000 UT) tablet  Commonly known as: Vitamin D-3  Medication Adjustments for Surgery: Stop 7 days before surgery     citalopram 40 mg tablet  Commonly known as: CeleXA  Take 1 tablet (40 mg) by mouth once daily.  Medication Adjustments for Surgery: Take morning of surgery with sip of water, no other fluids     furosemide 20 mg tablet  Commonly known as: Lasix  Take 1 tablet (20 mg) by mouth once daily.  Medication Adjustments for Surgery: Continue until night before surgery     Gemtesa 75 mg tablet  Generic drug: vibegron  Medication Adjustments for Surgery: Other (Comment)  Notes to patient: Per provider recommendations     hydrocortisone 2.5 % cream  Apply topically 2 times a day as needed for irritation or rash.  Medication Adjustments for Surgery: Stop 1 day before surgery     lisinopril 20 mg tablet  Take 1 tablet (20 mg) by mouth once daily.  Medication Adjustments for Surgery: Continue until night before surgery     magnesium oxide 400 mg tablet  Commonly known as: Mag-Ox  Take 1 tablet (400 mg) by mouth once daily.  Medication Adjustments for Surgery: Stop 7 days before surgery     mupirocin 2 % ointment  Commonly known as: Bactroban  Apply topically once daily. For daily use on surgical wound after cleansing for 30 days  Medication Adjustments for Surgery: Other (Comment)  Notes to patient: Per provider recommendations     nitroglycerin 0.4 mg SL tablet  Commonly known as: Nitrostat  Place 1 tablet (0.4 mg) under the tongue every 5 minutes if needed (angina). Times three PRN     oxygen gas therapy  Commonly known as: O2  Medication Adjustments for Surgery: Continue until night before surgery     tamsulosin 0.4 mg 24 hr capsule  Commonly known as: Flomax  Take 1 capsule (0.4 mg) by mouth  once daily at bedtime.  Medication Adjustments for Surgery: Take morning of surgery with sip of water, no other fluids     traMADol 50 mg tablet  Commonly known as: Ultram  Take 1 tablet (50 mg) by mouth 2 times a day as needed for severe pain (7 - 10) for up to 7 doses.  Medication Adjustments for Surgery: Take morning of surgery with sip of water, no other fluids     TYLENOL ORAL  Medication Adjustments for Surgery: Take morning of surgery with sip of water, no other fluids     ZYRTEC ORAL  Medication Adjustments for Surgery: Take morning of surgery with sip of water, no other fluids                          PRE-OPERATIVE INSTRUCTIONS    You will receive notification one business day prior to your procedure to confirm your arrival time. It is important that you answer your phone and/or check your messages during this time. If you do not hear from the surgery center by 5 pm. the day before your procedure, please call [phone].     Please enter the building through the Outpatient entrance and take the elevator off the lobby to the 2nd floor then check in at the Outpatient Surgery desk on the 2nd floor.    INSTRUCTIONS:  Talk to your surgeon for instructions if you should stop your aspirin, blood thinner, or diabetes medicines.  DO NOT take any multivitamins or over the counter supplements for 7-10 days before surgery.  If not being admitted, you must have an adult immediately available to drive you home after surgery. We also highly recommend you have someone stay with you for the entire day and night of your surgery.  For children having surgery, a parent or legal guardian must accompany them to the surgery center. If this is not possible, please call [phone] to make additional arrangements.  For adults who are unable to consent or make medical decisions for themselves, a legal guardian or Power of  must accompany them to the surgery center. If this is not possible, please call [phone] to make  additional arrangements.  Wear comfortable, loose fitting clothing.  All jewelry and piercings must be removed. If you are unable to remove an item or have a dermal piercing, please be sure to tell the nurse when you arrive for surgery.  Nail polish and make-up must be removed.  Avoid smoking or consuming alcohol for 24 hours before surgery.  To help prevent infection, please take a shower/bath and wash your hair the night before and/or morning of surgery (or follow other specific bathing instructions provided).    Preoperative Fasting Guidelines    Why must I stop eating and drinking near surgery time?  With sedation, food or liquid in your stomach can enter your lungs causing serious complications  Increases nausea and vomiting    When do I need to stop eating and drinking before my surgery?  Do not eat any solid food after midnight the night before your surgery/procedure unless otherwise instructed by your surgeon.   You may have up to 13.5 ounces of clear liquid until TWO hours before your instructed arrival time to the hospital.  This includes water, black tea/coffee, (no milk or cream) apple juice, and electrolyte drinks (Gatorade).   You may chew gum until TWO hours before your surgery/procedure      If applicable, notify your surgeons office immediately of any new skin changes that occur to the surgical limb.      If you have any questions or concerns, please call Pre-Admission Testing at [phone].       Home Preoperative Antibacterial Shower with Chlorhexidine gluconate (CHG)     What is a home preoperative antibacterial shower?  This shower is a way of cleaning the skin with a germ killing solution before surgery. The solution contains chlorhexidine gluconate, commonly known as CHG. CHG is a skin cleanser with germ killing ability. Let your doctor know if you are allergic to chlorhexidine.    Why do I need to take a preoperative antibacterial shower?  Skin is not sterile. It is best to try to make  your skin as free of germs as possible before surgery. Proper cleansing with a germ killing soap before surgery can lower the number of germs on your skin. This helps to reduce the risk of infection at the surgical site. Following the instructions listed below will help you prepare your skin for surgery.    How do I use the solution?  Begin using your CHG soap the night before and again the morning of your procedure.   Do not shave the day before or day of surgery.  Remove all jewelry until after surgery. Take off rings and take out all body-piercing jewelry.  Wash your face and hair with normal soap and shampoo before you use the CHG soap.  Apply the CHG solution to a clean wet washcloth. Move away from the water to avoid premature rinsing of the CHG soap as you are applying. Firmly lather your entire body from the neck down. Do not use CHG on your face, eyes, ears, or genitals.   Pay special attention to the area where your incisions will be located.  Do not scrub your skin too hard.  It is important to allow the CHG soap to sit on your skin for 3-5 minutes.  Rinse the solution off your body completely. Do not wash with your normal soap after the CHG soap solution.  Pat yourself dry with a clean, soft towel.  Do not apply powders, lotions or deodorants as these might block how the CHG soap works.   Dress in clean clothing.  Be sure to sleep with clean, freshly laundered sheets.  Be aware that CHG can cause stains on fabric. Rinse your washcloth and other linens that have contact with CHG completely. Use only non-chlorine detergents to launder the items used.

## 2024-06-25 NOTE — CPM/PAT H&P
CPM/PAT Evaluation       Name: Edu Domingo (Edu Domingo)  /Age: 1950/73 y.o.     In-Person     HPI 72 yo female here for preop evaluaton for kidney stones. Developed RLQ pain that radiated to her back and imaging showed right kidney stone. She continues to have discomfort but denies urgency, frequency, burning or hematuria.    Past Medical History:   Diagnosis Date    Anxiety     Anxiety disorder, unspecified     Anxiety    Calculus of kidney     Bilateral kidney stones    Cataracts, bilateral     Chronic kidney disease     stage 3    COPD (chronic obstructive pulmonary disease) (Multi)     Coronary artery disease     Hypertension     Lung cancer (Multi)     Major depressive disorder, recurrent, in partial remission (CMS-[facility]) 2021    Recurrent major depressive disorder, in partial remission    Nephrolithiasis     Old myocardial infarction     History of myocardial infarction    Other conditions influencing health status     Coronary Artery Disease    Other transient cerebral ischemic attacks and related syndromes     Subclavian steal syndrome    Personal history of other diseases of the circulatory system     History of hypertension    Personal history of other malignant neoplasm of bronchus and lung     Personal history of malignant neoplasm of lung    Personal history of other specified conditions 01/10/2019    History of abdominal pain    Skin cancer     squamous cell carcinoma       Past Surgical History:   Procedure Laterality Date    CT ANGIO NECK  2021    CT NECK ANGIO W AND WO IV CONTRAST 2021 [facility] CLINICAL LEGACY    CT ANGIO NECK  2022    CT NECK ANGIO W AND WO IV CONTRAST 2022 [facility] ANCILLARY LEGACY    CT HEAD ANGIO W AND WO IV CONTRAST  2021    CT HEAD ANGIO W AND WO IV CONTRAST 2021 [facility] CLINICAL LEGACY    LUNG LOBECTOMY  2014    Lung Lobectomy    OTHER SURGICAL HISTORY  2013    Cath Stent 1 Type Drug-Eluting    OTHER SURGICAL HISTORY   08/20/2013    Cardiac Cath Procedure Outcome: Successful    OTHER SURGICAL HISTORY  04/22/2014    Destruction Of Malignant Lesion       Patient  has no history on file for sexual activity.    Family History   Problem Relation Name Age of Onset    Heart disease Mother      Rectal cancer Mother      Other (cardiac disorder) Mother      Prostate cancer Father      Ovarian cancer Mother's Sister      Breast cancer Mother's Sister      Diabetes Maternal Grandmother      Diabetes Maternal Grandfather      Cancer Cousin         Allergies   Allergen Reactions    Morphine Unknown    Myrbetriq [Mirabegron] Other     Elevated blood pressure.    Oxycodone-Acetaminophen Unknown    Sulfa (Sulfonamide Antibiotics) Unknown       Prior to Admission medications    Medication Sig Start Date End Date Taking? Authorizing Provider   acetaminophen (TYLENOL ORAL) Take 1 tablet by mouth once daily as needed.    Historical Provider, MD   albuterol 90 mcg/actuation inhaler INHALE 2 PUFFS BY MOUTH AS INSTRUCTED EVERY 4 HOURS AS NEEDED FOR WHEEZING / SHORTNESS OF BREATH 2/1/24   Abram Salmeron DO   ALPRAZolam (Xanax) 0.5 mg tablet Take 1 tablet (0.5 mg) by mouth 3 times a day as needed for anxiety. 5/6/24   Abram Salmeron DO   amLODIPine (Norvasc) 5 mg tablet Take 1 tablet (5 mg) by mouth once daily. 5/6/24 11/2/24  Abram Salmeron DO   ammonium lactate (Lac-Hydrin) 12 % lotion Apply topically if needed for dry skin. 4/22/24 4/22/25  Aimee Ochoa MD   Anoro Ellipta 62.5-25 mcg/actuation blister with device INHALE 1 PUFF BY MOUTH AS INSTRUCTED ONCE DAILY. 6/4/24   Abram Salmeron DO   aspirin 81 mg EC tablet Take 1 tablet (81 mg) by mouth once daily.    Historical Provider, MD   atorvastatin (Lipitor) 80 mg tablet Take 1 tablet (80 mg) by mouth once daily. 12/18/23   Abram Salmeron DO   carvedilol (Coreg) 25 mg tablet Take 1 tablet (25 mg) by mouth 2 times a day with meals. 4/11/24   Abram Salmeron DO    celecoxib (CeleBREX) 200 mg capsule Take 1 capsule (200 mg) by mouth once daily as needed for moderate pain (4 - 6). 6/10/24   Abram Salmeron DO   cetirizine HCl (ZYRTEC ORAL) Take 1 capsule by mouth once daily as needed.    Historical Provider, MD   chlorhexidine (Hibiclens) 4 % external liquid Apply topically once daily. Cleanse surgical site and extend to foot daily in the shower for 30 days. 5/31/24 6/30/24  Dk Mandel MD PhD   cholecalciferol (Vitamin D-3) 50 MCG (2000 UT) tablet Take by mouth.    Historical Provider, MD   citalopram (CeleXA) 40 mg tablet Take 1 tablet (40 mg) by mouth once daily. 1/25/24   Abram Salmeron DO   furosemide (Lasix) 20 mg tablet Take 1 tablet (20 mg) by mouth once daily. 6/24/24 6/24/25  Abram Salmeron DO   Gemtesa 75 mg tablet Take 1 tablet (75 mg) by mouth once daily. 8/8/23   Historical Provider, MD   hydrocortisone 2.5 % cream Apply topically 2 times a day as needed for irritation or rash. 6/24/24 6/24/25  Abram Salmeron DO   lisinopril 20 mg tablet Take 1 tablet (20 mg) by mouth once daily. 3/21/23   Abram Salmeron DO   magnesium oxide (Mag-Ox) 400 mg tablet Take 1 tablet (400 mg) by mouth once daily. 5/16/24   Abram Salmeron DO   mupirocin (Bactroban) 2 % ointment Apply topically once daily. For daily use on surgical wound after cleansing for 30 days 5/31/24 6/30/24  Dk Mandel MD PhD   nitroglycerin (Nitrostat) 0.4 mg SL tablet Place 1 tablet (0.4 mg) under the tongue every 5 minutes if needed (angina). Times three PRN 7/6/23   Abram Salmeron DO   oxygen (O2) gas therapy at bedtime 1/6/22   Historical Provider, MD   tamsulosin (Flomax) 0.4 mg 24 hr capsule Take 1 capsule (0.4 mg) by mouth once daily at bedtime. 6/24/24 7/24/24  Abram Salmeron,    traMADol (Ultram) 50 mg tablet Take 1 tablet (50 mg) by mouth 2 times a day as needed for severe pain (7 - 10) for up to 7 doses. 6/13/24   Abram Salmeron DO   furosemide  (Lasix) 20 mg tablet Take 1 tablet (20 mg) by mouth once daily. 5/6/24 6/24/24  Abram Salmeron DO   predniSONE (Deltasone) 20 mg tablet Take three po every day for three days then two po every day for three days then one po every day for three days then one po every other day until done. 6/4/24 6/24/24  Abram Salmeron DO   tamsulosin (Flomax) 0.4 mg 24 hr capsule Take 1 capsule (0.4 mg) by mouth once daily at bedtime. 5/23/24 6/24/24  Margot Espinoza, APRN-CNP      Constitutional: Negative for fever, chills, or sweats   ENMT: Negative for nasal discharge, congestion, ear pain, mouth pain, throat pain   Respiratory: Negative for cough, wheezing, shortness of breath   Cardiac: Negative for chest pain, dyspnea on exertion, palpitations   Gastrointestinal: Negative for nausea, vomiting, diarrhea, constipation, abdominal pain  Genitourinary: see hpi   Musculoskeletal: Negative for decreased ROM, pain, swelling, weakness  Neurological: Negative for dizziness, confusion, headache  Psychiatric: Negative for mood changes   Skin: + skin cancer. Negative for itching, rash, ulcer    Hematologic/Lymph: Negative for bruising, easy bleeding  Allergic/Immunologic: Negative itching, sneezing, swelling     CONSTITUTIONAL - well nourished, well developed, looks older than stated age  SKIN - Various lesions on her extremities especially lower legs that were recently treated by derm. Small incision right lower thigh healing. Right foot open lesions x 3 presently being treated by derm. Bandaid intact. normal skin color and pigmentation, no rash.  HEAD - no trauma, normocephalic  EYES - pupils are equal and reactive to light, extraocular muscles are intact  ENT - uvula midline, normal tongue movement and throat normal, no exudate, nasal passage without discharge and patent  NECK - supple without rigidity, full ROM  CHEST - clear to auscultation, no wheezing, no crackles and no rales, good effort  CARDIAC - regular rate and  regular rhythm, no skipped beats, no murmur  ABDOMEN - no organomegaly, soft, tender RLQ upon deep palpation, nondistended, normal bowel sounds, no guarding/rebound/rigidity   EXTREMITIES - no edema, no deformities  NEUROLOGICAL - normal gait, normal balance, normal motor; alert, oriented and no focal signs  PSYCHIATRIC - alert, pleasant and cordial, age-appropriate  IMMUNOLOGIC - no cervical lymphadenopathy     PAT AIRWAY:   Airway:     Mallampati::  II    Neck ROM::  Full   Broken teeth      Visit Vitals  BP (!) 93/43   Pulse 66   Temp 36.2 °C (97.2 °F) (Temporal)   Resp 18      [id number] NSR HR 64 bpm    DASI Risk Score      Flowsheet Row Most Recent Value   DASI SCORE 32.2   METS Score (Will be calculated only when all the questions are answered) 6.7          Caprini DVT Assessment      Flowsheet Row Most Recent Value   DVT Score 14   Current Status Minor surgery planned   History Prior major surgery, COPD, Previous malignancy, Stroke, Acute myocardial infarction   Age 60-75 years   BMI 30 or less          Modified Frailty Index      Flowsheet Row Most Recent Value   Modified Frailty Index Calculator .4545          CHADS2 Stroke Risk  Current as of 9 minutes ago        N/A 3 to 100%: High Risk   2 to < 3%: Medium Risk   0 to < 2%: Low Risk     Last Change: N/A          This score determines the patient's risk of having a stroke if the patient has atrial fibrillation.        This score is not applicable to this patient. Components are not calculated.          Revised Cardiac Risk Index    No data to display       Apfel Simplified Score    No data to display       Risk Analysis Index Results This Encounter         2024  0950             MULLER Cancer History: Patient does not indicate history of cancer    Total Risk Analysis Index Score Without Cancer: 23    Total Risk Analysis Index Score: 23          Stop Bang Score      Flowsheet Row Most Recent Value   Do you snore loudly? 0   Do you often feel tired or  fatigued after your sleep? 1   Has anyone ever observed you stop breathing in your sleep? 0   Do you have or are you being treated for high blood pressure? 1   Recent BMI (Calculated) 20.9   Is BMI greater than 35 kg/m2? 0=No   Age older than 50 years old? 1=Yes   Is your neck circumference greater than 17 inches (Male) or 16 inches (Female)? 0   Gender - Male 0=No   STOP-BANG Total Score 3            Assessment and Plan:   74 yo female scheduled for ureteroscopy and laser lithotripsy with Dr. Monroe 7/5/2024. Labs ordered by surgeon.  Instructed regarding discontinuing smoking but patient presently does not want to quit. Will decrease for surgery.  Anesthesia to review.  See risk scores as previously documented.

## 2024-06-26 ENCOUNTER — APPOINTMENT (OUTPATIENT)
Dept: DERMATOLOGY | Facility: CLINIC | Age: 74
End: 2024-06-26
Payer: MEDICARE

## 2024-06-27 LAB — BACTERIA UR CULT: NO GROWTH

## 2024-07-01 DIAGNOSIS — N39.46 URINARY INCONTINENCE, MIXED: ICD-10-CM

## 2024-07-02 ENCOUNTER — OFFICE VISIT (OUTPATIENT)
Dept: CARDIOLOGY | Facility: CLINIC | Age: 74
End: 2024-07-02
Payer: MEDICARE

## 2024-07-02 ENCOUNTER — TELEPHONE (OUTPATIENT)
Dept: CARDIOLOGY | Facility: CLINIC | Age: 74
End: 2024-07-02

## 2024-07-02 VITALS
SYSTOLIC BLOOD PRESSURE: 144 MMHG | BODY MASS INDEX: 21.13 KG/M2 | HEIGHT: 65 IN | DIASTOLIC BLOOD PRESSURE: 74 MMHG | HEART RATE: 61 BPM | WEIGHT: 126.8 LBS

## 2024-07-02 DIAGNOSIS — E78.2 MIXED HYPERLIPIDEMIA: ICD-10-CM

## 2024-07-02 DIAGNOSIS — I25.5 CARDIOMYOPATHY, ISCHEMIC: ICD-10-CM

## 2024-07-02 DIAGNOSIS — Z98.61 CAD S/P PERCUTANEOUS CORONARY ANGIOPLASTY: ICD-10-CM

## 2024-07-02 DIAGNOSIS — I10 ESSENTIAL HYPERTENSION: ICD-10-CM

## 2024-07-02 DIAGNOSIS — R94.31 ABNORMAL EKG: Primary | ICD-10-CM

## 2024-07-02 DIAGNOSIS — I47.29 NON-SUSTAINED VENTRICULAR TACHYCARDIA (MULTI): ICD-10-CM

## 2024-07-02 DIAGNOSIS — I25.10 CAD S/P PERCUTANEOUS CORONARY ANGIOPLASTY: ICD-10-CM

## 2024-07-02 DIAGNOSIS — I10 PRIMARY HYPERTENSION: ICD-10-CM

## 2024-07-02 DIAGNOSIS — K21.9 GASTROESOPHAGEAL REFLUX DISEASE WITHOUT ESOPHAGITIS: ICD-10-CM

## 2024-07-02 DIAGNOSIS — C34.90 ADENOCARCINOMA OF LUNG, UNSPECIFIED LATERALITY (MULTI): ICD-10-CM

## 2024-07-02 PROCEDURE — 3077F SYST BP >= 140 MM HG: CPT | Performed by: INTERNAL MEDICINE

## 2024-07-02 PROCEDURE — 4004F PT TOBACCO SCREEN RCVD TLK: CPT | Performed by: INTERNAL MEDICINE

## 2024-07-02 PROCEDURE — 99215 OFFICE O/P EST HI 40 MIN: CPT | Performed by: INTERNAL MEDICINE

## 2024-07-02 PROCEDURE — 1123F ACP DISCUSS/DSCN MKR DOCD: CPT | Performed by: INTERNAL MEDICINE

## 2024-07-02 PROCEDURE — 3078F DIAST BP <80 MM HG: CPT | Performed by: INTERNAL MEDICINE

## 2024-07-02 PROCEDURE — 3008F BODY MASS INDEX DOCD: CPT | Performed by: INTERNAL MEDICINE

## 2024-07-02 PROCEDURE — 93000 ELECTROCARDIOGRAM COMPLETE: CPT | Performed by: INTERNAL MEDICINE

## 2024-07-02 PROCEDURE — 1159F MED LIST DOCD IN RCRD: CPT | Performed by: INTERNAL MEDICINE

## 2024-07-02 NOTE — PROGRESS NOTES
CARDIOLOGY OFFICE NOTE     Date:   7/2/2024    Patient:    Edu Domingo    YOB: 1950    Primary Physician: Abram Salmeron DO       Reason for Visit: 6-month cardiology visit.    HPI:     Edu Domingo was seen in cardiac evaluation at the  Cardiology office July 2, 2024.      The patients problems are listed as in the impression below.    Electronic medical records reviewed.    Patient returns.  Feels well overall.  Asymptomatic.  She has not used any nitroglycerin.  She is apparently scheduled for kidney stone surgery in the near future.  Preoperative cardiac assessment is requested.    Overall she states that she is well.  She has had no symptoms.  She did have foot skin cancer surgery under local recent.  She states that she is able to climb a flight of stairs carrying laundry without any symptoms.  She has no issues otherwise.  This suggest that she at least can tolerate 4 METS without cardiovascular issues.    Patient denies Chest Pain, SOB, Lightheadedness, Dizziness, TIA or CVA symptoms.  No CHF or Edema.  No Palpitations.  No GI,  or Bleeding Issues. No Recent Fever or Chills.     Cardiovascular and general review of systems is otherwise negative.    A 14-system review is otherwise negative, other than noted.     PHYSICAL EXAMINATION:      Vitals:    07/02/24 1202   BP: 144/74   Pulse: 61     General: No acute distress. Vital signs as noted. Alert and oriented.  Head And Neck Examination: No jugular venous distention, no carotid bruits, no mass. Carotid upstrokes preserved. Oral mucosa moist.  No xanthelasma. Head and neck examination otherwise unremarkable.  Lungs: Clear to auscultation and percussion. No wheezes, no rales,  and no rhonchi.  Chest: Excursion appeared to be normal. No chest wall tenderness on palpation.  Heart: Normal S1 and S2. No S3. No S4. No rub. Grade 1/6 systolic murmur, best heard at the left sternal border. Point of maximal impulse was within normal  limits.  Abdomen: Soft. Nontender. No organomegaly. No bruits. No masses.   Extremities: No bipedal edema. No clubbing. No cyanosis.  Pulses are strong throughout. No bruits.  Musculoskeletal Exam: No ulcers, otherwise unremarkable.  Neuro: Neurologically appeared grossly intact.     IMPRESSION:      Preoperative cardiac clearance, acceptable cardiovascular risk for planned surgery  Nephrolithiasis, planned lithotripsy and resection surgery.  Cardiovascular status stable  Asymptomatic  Coronary artery disease history of RCA PCI, moderate known left circumflex disease, medical treatment  Ischemic cardiomyopathy, resolved  LVEF 55% by echocardiogram 11/2022 with inferior lateral hypokinesia  Nonsustained ventricular tachycardia  COPD  History of prior TIA  Prior lobectomy 2014  Depression  Degenerative joint disease  Anxiety  Hypertension  Hyperlipidemia  Ongoing tobacco abuse  Family history of coronary artery disease  Otherwise as per assessment below.    RECOMMENDATIONS:      Patient was reassured overall.  Should be a reasonable candidate for planned kidney stone surgery.  She should be reasonable from a cardiovascular standpoint for general anesthesia if need be.    From a cardiovascular standpoint as well she should continue her current medications.  Refills were provided.  She can hold her aspirin if need be.    Exercise dietary program was encouraged.  Hydration.    [employer] portal use was encouraged.    We will plan to see back in 6 months with Laboratory Studies and ECG as ordered.     Patient will follow up with their primary physician for general care.    The patient knows to contact medical care earlier if need be.    ALLERGIES:     Allergies   Allergen Reactions    Morphine Unknown    Myrbetriq [Mirabegron] Other     Elevated blood pressure.    Oxycodone-Acetaminophen Unknown    Sulfa (Sulfonamide Antibiotics) Unknown        MEDICATIONS:     Current Outpatient Medications   Medication Instructions     acetaminophen (TYLENOL ORAL) 1 tablet, oral, Daily PRN    albuterol 90 mcg/actuation inhaler INHALE 2 PUFFS BY MOUTH AS INSTRUCTED EVERY 4 HOURS AS NEEDED FOR WHEEZING / SHORTNESS OF BREATH    ALPRAZolam (XANAX) 0.5 mg, oral, 3 times daily PRN    amLODIPine (NORVASC) 5 mg, oral, Daily    ammonium lactate (Lac-Hydrin) 12 % lotion Topical, As needed    Anoro Ellipta 62.5-25 mcg/actuation blister with device INHALE 1 PUFF BY MOUTH AS INSTRUCTED ONCE DAILY.    aspirin 81 mg EC tablet 1 tablet, oral, Daily    atorvastatin (LIPITOR) 80 mg, oral, Daily    carvedilol (COREG) 25 mg, oral, 2 times daily (morning and late afternoon)    celecoxib (CELEBREX) 200 mg, oral, Daily PRN    cetirizine HCl (ZYRTEC ORAL) 1 capsule, oral, Daily PRN    chlorhexidine (Hibiclens) 4 % external liquid Topical, Daily, Cleanse surgical site and extend to foot daily in the shower for 30 days.    cholecalciferol (Vitamin D-3) 50 MCG (2000 UT) tablet oral    citalopram (CELEXA) 40 mg, oral, Daily    furosemide (LASIX) 20 mg, oral, Daily    Gemtesa 75 mg tablet 1 tablet, oral, Daily    hydrocortisone 2.5 % cream Topical, 2 times daily PRN    lisinopril 20 mg, oral, Daily    magnesium oxide (MAG-OX) 400 mg, oral, Daily    mupirocin (Bactroban) 2 % ointment Topical, Daily, For daily use on surgical wound after cleansing for 30 days    nitroglycerin (NITROSTAT) 0.4 mg, sublingual, Every 5 min PRN, Times three PRN    oxygen (O2) gas therapy at bedtime    tamsulosin (FLOMAX) 0.4 mg, oral, Nightly    traMADol (ULTRAM) 50 mg, oral, 2 times daily PRN       ELECTROCARDIOGRAM:      Sinus rhythm, left ventricular hypertrophy, left nonspecific ST wave changes.  Rate 63.  No acute changes overall.    CARDIAC TESTING:      None this visit    LABORATORY DATA:      CBC:   Lab Results   Component Value Date    WBC 8.8 06/25/2024    RBC 4.37 06/25/2024    HGB 13.0 06/25/2024    HCT 40.4 06/25/2024     06/25/2024        CMP:    Lab Results   Component Value  Date     06/25/2024    K 4.5 06/25/2024     06/25/2024    CO2 28 06/25/2024    BUN 21 06/25/2024    CREATININE 1.21 (H) 06/25/2024    GLUCOSE 98 06/25/2024    CALCIUM 9.2 06/25/2024                 PROBLEM LIST:     Patient Active Problem List   Diagnosis    Abdominal pain    Abnormal EKG    Adenocarcinoma of lung (Multi)    Antibiotic-associated diarrhea    Anxiety    Atypical chest pain    Back skin lesion    Benign skin cyst    Bilateral carotid bruits    Blepharitis    Bright red rectal bleeding    Bruit of left carotid artery    CAD S/P percutaneous coronary angioplasty    Cellulitis of left axilla    Cellulitis of right axilla    Cellulitis of right ear    Cellulitis, lip    Chest pain    Colitis    Depression, major, single episode, mild (CMS-HCC)    Dizziness    Essential hypertension    Excessive ear wax, bilateral    External hemorrhoids    GERD (gastroesophageal reflux disease)    Hand injury, right, initial encounter    Cephalgia    Headache    Chronic hip pain    Hip pain, left    Hip pain, right    Hives    Hyperlipidemia    Hypertension    Left lumbar radiculitis    Menopausal state    Multiple dysplastic nevi    Near syncope    Nocturia    Nicotine use disorder    Arthritis, degenerative    Osteoarthritis    Overactive bladder    Pain due to onychomycosis of nail    Palpitations    Pneumonia    Post-traumatic osteoarthritis of both ankles    Primary nocturnal enuresis    Radial nerve compression    Rectal bleed    Skin lesion of lower extremity    Squamous cell skin cancer    STEMI (ST elevation myocardial infarction) (Multi)    Carotid artery stenosis    Stenosis of right carotid artery    Stye    Subcutaneous mass    Syncope    Urge incontinence of urine    Urinary incontinence    Urinary urgency    UTI (urinary tract infection)    Vulvar intraepithelial neoplasia (DAMIEN) grade 3    Nephrolithiasis    Sprain of right hand    Skin lesion of face    Acute URI    Right otitis externa     Pharyngitis, acute    Acute sinusitis    Acute otitis media    Acute otitis externa    Acute bronchitis    Cellulitis of face    Non-sustained ventricular tachycardia (Multi)    Actinic keratosis    Other seborrheic keratosis    Chronic cough    Coronary artery disease with unstable angina pectoris (Multi)    Edema of both lower extremities    General weakness    Hematuria    Hypertensive renovascular disease    Injury of radial nerve    Neoplasm of uncertain behavior of skin    Osteoarthritis of carpometacarpal (CMC) joint of thumb    Other melanin hyperpigmentation    Personal history of other malignant neoplasm of skin    Pyelonephritis    Sleep attack (HHS-HCC)    Subclavian steal syndrome    Greater trochanteric pain syndrome    Benign neoplasm of soft tissues of upper limb    Hemangioma of skin and subcutaneous tissue    Abscess of face    Squamous cell carcinoma of skin of lower limb, including hip    BMI 22.0-22.9, adult    Nuclear sclerotic cataract of both eyes    Vulvar dysplasia    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    Patient was seen and examined with total time of visit including chart preparation, rooming, and chart completion exceeding 40 minutes.      ----

## 2024-07-02 NOTE — PATIENT INSTRUCTIONS
OK TO PROCEED WITH SURGERY   HOLD ASPIRIN 7 DAYS PRIOR TO PROCEDURE     Exercise diet weight loss program.     Stay plenty HYDRATED     Use My Chart portal for reviewing records, testing and contacting office.      Please bring all medicines, vitamins, and herbal supplements with you in original bottles to every appointment!!!!    Prescriptions will not be filled unless you are compliant with your follow up appointments or have a follow up appointment scheduled as per instruction of your physician. Refills should be requested at the time of your visit.

## 2024-07-03 RX ORDER — VIBEGRON 75 MG/1
1 TABLET, FILM COATED ORAL DAILY
Qty: 30 TABLET | Refills: 2 | Status: ON HOLD | OUTPATIENT
Start: 2024-07-03

## 2024-07-05 ENCOUNTER — ANESTHESIA EVENT (OUTPATIENT)
Dept: OPERATING ROOM | Facility: HOSPITAL | Age: 74
End: 2024-07-05
Payer: MEDICARE

## 2024-07-05 ENCOUNTER — ANESTHESIA (OUTPATIENT)
Dept: OPERATING ROOM | Facility: HOSPITAL | Age: 74
End: 2024-07-05
Payer: MEDICARE

## 2024-07-05 ENCOUNTER — APPOINTMENT (OUTPATIENT)
Dept: DERMATOLOGY | Facility: CLINIC | Age: 74
End: 2024-07-05
Payer: MEDICARE

## 2024-07-05 ENCOUNTER — APPOINTMENT (OUTPATIENT)
Dept: RADIOLOGY | Facility: HOSPITAL | Age: 74
End: 2024-07-05
Payer: MEDICARE

## 2024-07-05 ENCOUNTER — HOSPITAL ENCOUNTER (OUTPATIENT)
Facility: HOSPITAL | Age: 74
Setting detail: OUTPATIENT SURGERY
Discharge: HOME | End: 2024-07-05
Attending: UROLOGY | Admitting: UROLOGY
Payer: MEDICARE

## 2024-07-05 VITALS
SYSTOLIC BLOOD PRESSURE: 146 MMHG | WEIGHT: 125.44 LBS | HEIGHT: 65 IN | RESPIRATION RATE: 17 BRPM | BODY MASS INDEX: 20.9 KG/M2 | DIASTOLIC BLOOD PRESSURE: 65 MMHG | OXYGEN SATURATION: 97 % | HEART RATE: 73 BPM | TEMPERATURE: 97.2 F

## 2024-07-05 DIAGNOSIS — N20.0 RIGHT NEPHROLITHIASIS: Primary | ICD-10-CM

## 2024-07-05 PROCEDURE — 2500000002 HC RX 250 W HCPCS SELF ADMINISTERED DRUGS (ALT 637 FOR MEDICARE OP, ALT 636 FOR OP/ED): Performed by: STUDENT IN AN ORGANIZED HEALTH CARE EDUCATION/TRAINING PROGRAM

## 2024-07-05 PROCEDURE — C1769 GUIDE WIRE: HCPCS | Performed by: UROLOGY

## 2024-07-05 PROCEDURE — 7100000009 HC PHASE TWO TIME - INITIAL BASE CHARGE: Performed by: UROLOGY

## 2024-07-05 PROCEDURE — 2780000003 HC OR 278 NO HCPCS: Performed by: UROLOGY

## 2024-07-05 PROCEDURE — C1758 CATHETER, URETERAL: HCPCS | Performed by: UROLOGY

## 2024-07-05 PROCEDURE — 7100000010 HC PHASE TWO TIME - EACH INCREMENTAL 1 MINUTE: Performed by: UROLOGY

## 2024-07-05 PROCEDURE — 2500000005 HC RX 250 GENERAL PHARMACY W/O HCPCS: Performed by: NURSE ANESTHETIST, CERTIFIED REGISTERED

## 2024-07-05 PROCEDURE — 2500000005 HC RX 250 GENERAL PHARMACY W/O HCPCS: Performed by: STUDENT IN AN ORGANIZED HEALTH CARE EDUCATION/TRAINING PROGRAM

## 2024-07-05 PROCEDURE — C2617 STENT, NON-COR, TEM W/O DEL: HCPCS | Performed by: UROLOGY

## 2024-07-05 PROCEDURE — 74420 UROGRAPHY RTRGR +-KUB: CPT | Performed by: UROLOGY

## 2024-07-05 PROCEDURE — 3700000001 HC GENERAL ANESTHESIA TIME - INITIAL BASE CHARGE: Performed by: UROLOGY

## 2024-07-05 PROCEDURE — 3700000002 HC GENERAL ANESTHESIA TIME - EACH INCREMENTAL 1 MINUTE: Performed by: UROLOGY

## 2024-07-05 PROCEDURE — 7100000002 HC RECOVERY ROOM TIME - EACH INCREMENTAL 1 MINUTE: Performed by: UROLOGY

## 2024-07-05 PROCEDURE — 52344 CYSTO/URETERO STRICTURE TX: CPT | Performed by: UROLOGY

## 2024-07-05 PROCEDURE — 3600000003 HC OR TIME - INITIAL BASE CHARGE - PROCEDURE LEVEL THREE: Performed by: UROLOGY

## 2024-07-05 PROCEDURE — 7100000001 HC RECOVERY ROOM TIME - INITIAL BASE CHARGE: Performed by: UROLOGY

## 2024-07-05 PROCEDURE — 2500000004 HC RX 250 GENERAL PHARMACY W/ HCPCS (ALT 636 FOR OP/ED): Performed by: NURSE ANESTHETIST, CERTIFIED REGISTERED

## 2024-07-05 PROCEDURE — 3600000008 HC OR TIME - EACH INCREMENTAL 1 MINUTE - PROCEDURE LEVEL THREE: Performed by: UROLOGY

## 2024-07-05 PROCEDURE — 2720000007 HC OR 272 NO HCPCS: Performed by: UROLOGY

## 2024-07-05 DEVICE — URETERAL STENT
Type: IMPLANTABLE DEVICE | Site: URETER | Status: FUNCTIONAL
Brand: CONTOUR™

## 2024-07-05 RX ORDER — MIDAZOLAM HYDROCHLORIDE 1 MG/ML
INJECTION, SOLUTION INTRAMUSCULAR; INTRAVENOUS AS NEEDED
Status: DISCONTINUED | OUTPATIENT
Start: 2024-07-05 | End: 2024-07-05

## 2024-07-05 RX ORDER — TRAMADOL HYDROCHLORIDE 50 MG/1
50 TABLET ORAL EVERY 8 HOURS PRN
Qty: 10 TABLET | Refills: 0 | Status: SHIPPED | OUTPATIENT
Start: 2024-07-05

## 2024-07-05 RX ORDER — FENTANYL CITRATE 50 UG/ML
INJECTION, SOLUTION INTRAMUSCULAR; INTRAVENOUS AS NEEDED
Status: DISCONTINUED | OUTPATIENT
Start: 2024-07-05 | End: 2024-07-05

## 2024-07-05 RX ORDER — GLYCOPYRROLATE 0.2 MG/ML
INJECTION INTRAMUSCULAR; INTRAVENOUS AS NEEDED
Status: DISCONTINUED | OUTPATIENT
Start: 2024-07-05 | End: 2024-07-05

## 2024-07-05 RX ORDER — PHENYLEPHRINE HCL IN 0.9% NACL 0.4MG/10ML
SYRINGE (ML) INTRAVENOUS AS NEEDED
Status: DISCONTINUED | OUTPATIENT
Start: 2024-07-05 | End: 2024-07-05

## 2024-07-05 RX ORDER — OXYCODONE HYDROCHLORIDE 10 MG/1
10 TABLET ORAL EVERY 4 HOURS PRN
Status: DISCONTINUED | OUTPATIENT
Start: 2024-07-05 | End: 2024-07-08 | Stop reason: HOSPADM

## 2024-07-05 RX ORDER — OXYCODONE HYDROCHLORIDE 5 MG/1
5 TABLET ORAL EVERY 4 HOURS PRN
Status: DISCONTINUED | OUTPATIENT
Start: 2024-07-05 | End: 2024-07-08 | Stop reason: HOSPADM

## 2024-07-05 RX ORDER — CEFAZOLIN 1 G/1
INJECTION, POWDER, FOR SOLUTION INTRAVENOUS AS NEEDED
Status: DISCONTINUED | OUTPATIENT
Start: 2024-07-05 | End: 2024-07-05

## 2024-07-05 RX ORDER — CEFAZOLIN SODIUM 2 G/100ML
2 INJECTION, SOLUTION INTRAVENOUS ONCE
Status: DISCONTINUED | OUTPATIENT
Start: 2024-07-05 | End: 2024-07-05 | Stop reason: HOSPADM

## 2024-07-05 RX ORDER — SODIUM CHLORIDE, SODIUM LACTATE, POTASSIUM CHLORIDE, CALCIUM CHLORIDE 600; 310; 30; 20 MG/100ML; MG/100ML; MG/100ML; MG/100ML
100 INJECTION, SOLUTION INTRAVENOUS CONTINUOUS
Status: DISCONTINUED | OUTPATIENT
Start: 2024-07-05 | End: 2024-07-08 | Stop reason: HOSPADM

## 2024-07-05 RX ORDER — LIDOCAINE HYDROCHLORIDE 10 MG/ML
0.1 INJECTION INFILTRATION; PERINEURAL ONCE
Status: DISCONTINUED | OUTPATIENT
Start: 2024-07-05 | End: 2024-07-08 | Stop reason: HOSPADM

## 2024-07-05 RX ORDER — LIDOCAINE HYDROCHLORIDE 20 MG/ML
INJECTION, SOLUTION INFILTRATION; PERINEURAL AS NEEDED
Status: DISCONTINUED | OUTPATIENT
Start: 2024-07-05 | End: 2024-07-05

## 2024-07-05 RX ORDER — PROPOFOL 10 MG/ML
INJECTION, EMULSION INTRAVENOUS AS NEEDED
Status: DISCONTINUED | OUTPATIENT
Start: 2024-07-05 | End: 2024-07-05

## 2024-07-05 RX ORDER — ACETAMINOPHEN 325 MG/1
975 TABLET ORAL ONCE
Status: COMPLETED | OUTPATIENT
Start: 2024-07-05 | End: 2024-07-05

## 2024-07-05 RX ORDER — ACETAMINOPHEN 325 MG/1
650 TABLET ORAL EVERY 4 HOURS PRN
Status: DISCONTINUED | OUTPATIENT
Start: 2024-07-05 | End: 2024-07-08 | Stop reason: HOSPADM

## 2024-07-05 RX ORDER — IPRATROPIUM BROMIDE AND ALBUTEROL SULFATE 2.5; .5 MG/3ML; MG/3ML
3 SOLUTION RESPIRATORY (INHALATION)
Status: DISCONTINUED | OUTPATIENT
Start: 2024-07-05 | End: 2024-07-05 | Stop reason: HOSPADM

## 2024-07-05 ASSESSMENT — COLUMBIA-SUICIDE SEVERITY RATING SCALE - C-SSRS
6. HAVE YOU EVER DONE ANYTHING, STARTED TO DO ANYTHING, OR PREPARED TO DO ANYTHING TO END YOUR LIFE?: NO
2. HAVE YOU ACTUALLY HAD ANY THOUGHTS OF KILLING YOURSELF?: NO
1. IN THE PAST MONTH, HAVE YOU WISHED YOU WERE DEAD OR WISHED YOU COULD GO TO SLEEP AND NOT WAKE UP?: NO

## 2024-07-05 ASSESSMENT — PAIN - FUNCTIONAL ASSESSMENT
PAIN_FUNCTIONAL_ASSESSMENT: 0-10
PAIN_FUNCTIONAL_ASSESSMENT: UNABLE TO SELF-REPORT
PAIN_FUNCTIONAL_ASSESSMENT: 0-10

## 2024-07-05 ASSESSMENT — PAIN SCALES - GENERAL
PAINLEVEL_OUTOF10: 9
PAINLEVEL_OUTOF10: 6

## 2024-07-05 NOTE — ANESTHESIA PREPROCEDURE EVALUATION
Patient: Edu Domingo    Procedure Information       Date/Time: 07/05/24 1250    Procedure: Ureteroscopy and laser lithotripsy-semirigid and flexible (Right)    Location: [facility] OR 08 / Virtual [facility] OR    Surgeons: Osmar Monroe MD            Relevant Problems   Anesthesia (within normal limits)      Cardiac   (+) Abnormal EKG   (+) Atypical chest pain   (+) CAD S/P percutaneous coronary angioplasty   (+) Chest pain   (+) Coronary artery disease with unstable angina pectoris (Multi)   (+) Essential hypertension   (+) Hyperlipidemia   (+) Hypertension   (+) STEMI (ST elevation myocardial infarction) (Multi)      Pulmonary   (+) Adenocarcinoma of lung (Multi)   (+) Pneumonia      Neuro   (+) Anxiety   (+) Carotid artery stenosis   (+) Depression   (+) Depression, major, single episode, mild (CMS-HCC)   (+) Injury of radial nerve   (+) Left lumbar radiculitis   (+) Radial nerve compression   (+) Stenosis of right carotid artery      GI   (+) Bright red rectal bleeding   (+) GERD (gastroesophageal reflux disease)   (+) Rectal bleed      /Renal   (+) Nephrolithiasis   (+) Pyelonephritis   (+) Right nephrolithiasis   (+) UTI (urinary tract infection)      Musculoskeletal   (+) Arthritis, degenerative   (+) Osteoarthritis   (+) Osteoarthritis of carpometacarpal (CMC) joint of thumb   (+) Post-traumatic osteoarthritis of both ankles      HEENT   (+) Acute sinusitis      ID   (+) Acute URI   (+) Pain due to onychomycosis of nail   (+) Pyelonephritis   (+) UTI (urinary tract infection)      Skin   (+) Squamous cell carcinoma of skin of lower limb, including hip   (+) Squamous cell skin cancer       Clinical information reviewed:   Tobacco  Allergies    Med Hx  Surg Hx  OB Status  Fam Hx  Soc Hx        NPO Detail:  No data recorded     Physical Exam    Airway  Mallampati: I  TM distance: >3 FB  Neck ROM: full     Cardiovascular - normal exam     Dental        Pulmonary - normal exam     Abdominal            Anesthesia  Plan    History of general anesthesia?: yes  History of complications of general anesthesia?: no    ASA 3     MAC     intravenous induction   Anesthetic plan and risks discussed with patient.  Use of blood products discussed with patient who.    Plan discussed with attending and CRNA.

## 2024-07-05 NOTE — ANESTHESIA POSTPROCEDURE EVALUATION
Patient: Edu Domingo    Procedure Summary       Date: 07/05/24 Room / Location: [facility] OR 08 / [facility] OR    Anesthesia Start: 1306 Anesthesia Stop: 1356    Procedure: Ureteroscopy and laser lithotripsy-semirigid and flexible (Right) Diagnosis:       Right nephrolithiasis      (Right nephrolithiasis [N20.0])    Surgeons: Osmar Monroe MD Responsible Provider: Ramesh Ramon DO    Anesthesia Type: MAC ASA Status: 3            Anesthesia Type: MAC    Vitals Value Taken Time   /73 07/05/24 1355   Temp 36.4 07/05/24 1358   Pulse 74 07/05/24 1356   Resp 16 07/05/24 1356   SpO2 92 % 07/05/24 1356   Vitals shown include unfiled device data.    Anesthesia Post Evaluation    Patient location during evaluation: PACU  Patient participation: complete - patient participated  Level of consciousness: awake and alert  Pain management: adequate  Airway patency: patent  Cardiovascular status: acceptable  Respiratory status: acceptable  Hydration status: acceptable  Postoperative Nausea and Vomiting: none        No notable events documented.

## 2024-07-05 NOTE — DISCHARGE INSTRUCTIONS
DEPARTMENT OF Urology  DISCHARGE INSTRUCTIONS Ureteroscopy Laser Lithotripsy  Outpatient Surgery    C O N F I D E N T I A L   I N F O R M A T I O N    Edu Domingo    Call [phone] during regular daytime business hours (8:00 am - 5:00 pm) and after 5:00 pm ask for the Urology resident with any questions or concerns.    If it is a life-threatening situation, proceed to the nearest emergency department.        Follow-up appointment:  7/12/24      Thank you for the opportunity to care for you today.  Your health and healing are very important to us.  We hope we made you feel as comfortable as possible and are committed to your recovery and continued well-being.      The following is a brief overview of your Ureteroscopy Laser Lithotripsy procedure today. Some of the information contained on this summary may be confidential.  This information should be kept in your records and should be shared with your regular doctor.    Physicians:   Dr. Monroe    Procedure performed: Lasering of your kidney stone    What to Expect During your Recovery and Home Care  Anesthesia Side Effects   You received anesthesia today.  You may feel sleepy, tired, or have a sore throat.   You may also feel drowsiness, dizziness, or inability to think clearly.  For your safety, do not drive, drink alcoholic beverages, take any unprescribed medication or make any important decisions for 24 hours.  A responsible adult should be with you for 24 hours.        Activity and Recovery    No heavy lifting today. Rest for the next 24 hours.    Pain Control  Unfortunately, you may experience pain after your procedure.  Frequency and urgency to urinate and mild discomfort are expected. Adequate pain management can include alternative measures to help ease your pain and that can include over the counter Tylenol/ibuprofen and a heating pad or tramadol which can be taken as prescribed as needed for breakthrough pain. Do not take more than 4,000mg of Tylenol  in a 24-hour period.      Nausea/Vomiting   Clear liquids are best tolerated at first. Start slow, advance your diet as tolerated to normal foods. Avoid spicy, greasy, heavy foods at first. Also, you may feel nauseous or like you need to vomit if you take any type of medication on an empty stomach.  Call your physician if you are unable to eat or drink and have persistent vomiting.    Signs of Bleeding   You could have some blood in your urine off and on over the next several weeks. Your urine will be light pink to yellow.    Treatment/wound care:   It is okay to shower 24 hours after time of surgery.    Signs of Infection  Signs of infection can include fever, chills, burning sensation with passing of urine, or severe abdominal pain.  If you see any of these occur, please contact your doctor's office at [phone].  Any fever higher than 100.4, especially if associated with an ill feeling, abdominal pain, chills, or nausea should be reported to your surgeon.      Assist in bowel movements/urination  Increase fiber in diet  Increase water (6 to 8 glasses)  Increase walking   Urination should occur within 6 hours of anesthesia  If you have tried these methods and your bladder still feels full and you cannot use the bathroom, please go to your nearest Emergency room/contact your physician.    Additional Instructions:   Pieces of your kidney stone may pass in the urine for a few days and may cause some mild pain. You also had a stent placed today from your kidney down into your bladder. This helps keep the urinary tract open and prevents blockages of urine flow. The stent can be irritating and can cause some frequency, urgency and blood in your urine when you go to the bathroom. It is important to drink plenty of water and take medications as prescribed. You may be sent home with Pyridium which turns your urine bright orange. Applying a heating pad to your back will also help with this kind of pain. It will be  determined at your follow up appointment when the stent will be removed.

## 2024-07-05 NOTE — H&P
History Of Present Illness  73-year-old female with right-sided flank pain, hydronephrosis and possible ureteral calculi who is here for right ureteroscopy and laser lithotripsy.  No recent changes in health    Past Medical History  Past Medical History:   Diagnosis Date    Anxiety     Anxiety disorder, unspecified     Anxiety    Calculus of kidney     Bilateral kidney stones    Cataracts, bilateral     Chronic kidney disease     stage 3    COPD (chronic obstructive pulmonary disease) (Multi)     Coronary artery disease     Hypertension     Lung cancer (Multi)     Major depressive disorder, recurrent, in partial remission (CMS-[facility]) 09/21/2021    Recurrent major depressive disorder, in partial remission    Nephrolithiasis     Old myocardial infarction     History of myocardial infarction    Other conditions influencing health status     Coronary Artery Disease    Other transient cerebral ischemic attacks and related syndromes     Subclavian steal syndrome    Personal history of other diseases of the circulatory system     History of hypertension    Personal history of other malignant neoplasm of bronchus and lung     Personal history of malignant neoplasm of lung    Personal history of other specified conditions 01/10/2019    History of abdominal pain    Skin cancer     squamous cell carcinoma        Surgical History  Past Surgical History:   Procedure Laterality Date    CT ANGIO NECK  11/26/2021    CT NECK ANGIO W AND WO IV CONTRAST 11/26/2021 [facility] CLINICAL LEGACY    CT ANGIO NECK  11/23/2022    CT NECK ANGIO W AND WO IV CONTRAST 11/23/2022 [facility] ANCILLARY LEGACY    CT HEAD ANGIO W AND WO IV CONTRAST  11/26/2021    CT HEAD ANGIO W AND WO IV CONTRAST 11/26/2021 [facility] CLINICAL LEGACY    LUNG LOBECTOMY  04/22/2014    Lung Lobectomy    OTHER SURGICAL HISTORY  08/20/2013    Cath Stent 1 Type Drug-Eluting    OTHER SURGICAL HISTORY  08/20/2013    Cardiac Cath Procedure Outcome: Successful    OTHER SURGICAL HISTORY  04/22/2014  "   Destruction Of Malignant Lesion        Social History  She reports that she has been smoking cigarettes. She started smoking about 52 years ago. She has a 26.3 pack-year smoking history. She has never used smokeless tobacco. She reports that she does not currently use alcohol. She reports that she does not currently use drugs.    Family History  Family History   Problem Relation Name Age of Onset    Heart disease Mother      Rectal cancer Mother      Other (cardiac disorder) Mother      Prostate cancer Father      Ovarian cancer Mother's Sister      Breast cancer Mother's Sister      Diabetes Maternal Grandmother      Diabetes Maternal Grandfather      Cancer Cousin          Allergies  Morphine, Myrbetriq [mirabegron], Oxycodone-acetaminophen, and Sulfa (sulfonamide antibiotics)    ROS: 12 system review was completed and is negative with the exception of those signs and symptoms noted in the history of present illness: A 12 system review was completed and is negative with the exception of those signs and symptoms noted in the history of present illness.     Exam:  General: in NAD, appears stated age  Head: normocephalic, atraumatic  Respiratory: normal effort, no use of accessory muscles  Cardiovascular: no edema noted  Skin: normal turgor, no rashes  Neurologic: grossly intact, oriented to person/place/time  Psychiatric: mode and affect appropriate    Per anesthesiology, clear to auscultation bilaterally with a regular rate and rhythm     Last Recorded Vitals  /60   Pulse 66   Temp 36.2 °C (97.2 °F)   Resp 13   Ht 1.651 m (5' 5\")   Wt 56.9 kg (125 lb 7.1 oz)   LMP  (LMP Unknown)   SpO2 100%   BMI 20.87 kg/m²       No results found for: \"URINECULTURE\", \"CREATININE\"      ASSESSMENT/PLAN:  Okay to proceed with right ureteroscopy and laser lithotripsy    Osmar Monroe MD    " Adequate: hears normal conversation without difficulty

## 2024-07-05 NOTE — OP NOTE
Ureteroscopy and laser lithotripsy-semirigid and flexible (R) Operative Note     Date: 2024  OR Location: [facility] OR    Name: Edu Domingo : 1950, Age: 73 y.o., MRN: [id number], Sex: female    Diagnosis  Pre-op Diagnosis     * Right nephrolithiasis [N20.0] Post-op Diagnosis     * Right nephrolithiasis [N20.0]     Procedures  Ureteroscopy and laser lithotripsy-semirigid and flexible  89926 - GA CYSTO/URETERO W/LITHOTRIPSY &INDWELL STENT INSRT      Surgeons      * Osmar Monroe - Primary    Resident/Fellow/Other Assistant:  Surgeons and Role:     * Landon Gibson MD - Resident - Assisting    Procedure Summary  Anesthesia: Monitor Anesthesia Care  ASA: III  Anesthesia Staff: Anesthesiologist: Ramesh Ramon DO  CRNA: SPIKE Palomo-CRNA  Estimated Blood Loss: 0mL  Intra-op Medications:   Administrations occurring from 1250 to 1415 on 24:   Medication Name Total Dose   ipratropium-albuteroL (Duo-Neb) 0.5-2.5 mg/3 mL nebulizer solution 3 mL 3 mL              Anesthesia Record               Intraprocedure I/O Totals       None           Specimen: No specimens collected     Staff:   Circulator: Karlos Isidro Person: Khanh         Drains and/or Catheters: * None in log *    Tourniquet Times:         Implants:  Implants       Type Name Action Serial No.      Stent STENT, URETERAL CONTOUR, 6FR X 24CM - [id number] Implanted               Findings: Very tight distal UO. No ureteral stone seen. Unable to pass flexible scope. May need renal lasix scan if still having symptoms after stent removal    Indications: Edu Domingo is an 73 y.o. female who is having surgery for Right nephrolithiasis [N20.0].     The patient was seen in the preoperative area. The risks, benefits, complications, treatment options, non-operative alternatives, expected recovery and outcomes were discussed with the patient. The possibilities of reaction to medication, pulmonary aspiration, injury to surrounding structures, bleeding,  recurrent infection, the need for additional procedures, failure to diagnose a condition, and creating a complication requiring transfusion or operation were discussed with the patient. The patient concurred with the proposed plan, giving informed consent.  The site of surgery was properly noted/marked if necessary per policy. The patient has been actively warmed in preoperative area. Preoperative antibiotics have been ordered and given within 1 hours of incision. Venous thrombosis prophylaxis have been ordered including bilateral sequential compression devices    Procedure Details:       Patient consented to procedure in preoperative area. Risks and benefits discussed. Patient was marked on the right side. Allergies were reviewed and preoperative antibiotics were administered. Patient was brought to the operating room and placed in supine position on the operating room table. A timeout was performed. All were in agreement. Patient underwent general anesthesia without complication. They were repositioned in dorsal lithotomy and prepped and draped in the usual sterile fashion. A 21 fr rigid cystoscope was used to perform cystourethroscopy. Urethra was normal.  UOs were in normal orthotopic position. No masses or stones were identified.  Sensor wire was placed through a 5Fr ureteral catheter to the level of the kidney as confirmed on fluoroscopy. The catheter was then removed. A dual lumen was placed over the wire. Retrograde pyelogram was performed.     Retrograde pyelogram interpretation: Right ureter with normal caliber and course. No obstructing stones seen    The wire was secured as a safety wire. A semi rigid ureteroscope was advanced to the patients right UO. Due to the narrow UO a second wire was advanced and the scope was inserted over the second wire. We were able to advanced the scope up to the UPJ and no stones were seen. A second wire was replaced. A flexible ureteroscope was advanced but curled in the  bladder. After some effort we were able to insert the scope into the distal ureter but could not advance further Given that her ureter was clear of stones we removed the scope and proceeded to placing a A [id number] JJ stent was placed over the safety wire with proximal curl noted in kidney on fluoro and distal curl in the bladder on direct visualization. Bladder was drained, instruments removed. This concluded the procedure. Patient was awoken from anesthesia without complication and transferred to PACU in stable condition.        Complications:  None; patient tolerated the procedure well.    Disposition: PACU - hemodynamically stable.  Condition: stable           Attending Attestation:     Osmar Monroe  Phone Number: [phone]

## 2024-07-05 NOTE — ANESTHESIA PROCEDURE NOTES
Airway  Date/Time: 7/5/2024 1:20 PM  Airway not difficult    Staffing  Performed: CRNA   Authorized by: MARTHA Palomo    Performed by: MARTHA Palomo  Patient location during procedure: OR    Indications and Patient Condition  Indications for airway management: anesthesia  Spontaneous ventilation: present  Sedation level: deep  Preoxygenated: yes  Mask difficulty assessment: 1 - vent by mask    Final Airway Details  Final airway type: supraglottic airway      Successful airway: classic  Size 4

## 2024-07-08 ENCOUNTER — TELEPHONE (OUTPATIENT)
Dept: UROLOGY | Facility: CLINIC | Age: 74
End: 2024-07-08
Payer: MEDICARE

## 2024-07-08 DIAGNOSIS — N20.0 RIGHT NEPHROLITHIASIS: Primary | ICD-10-CM

## 2024-07-08 RX ORDER — CYCLOBENZAPRINE HCL 5 MG
5 TABLET ORAL 3 TIMES DAILY
Qty: 30 TABLET | Refills: 0 | Status: SHIPPED | OUTPATIENT
Start: 2024-07-08 | End: 2024-07-18

## 2024-07-08 RX ORDER — ONDANSETRON 4 MG/1
4 TABLET, FILM COATED ORAL EVERY 8 HOURS PRN
Qty: 20 TABLET | Refills: 0 | Status: SHIPPED | OUTPATIENT
Start: 2024-07-08 | End: 2024-07-15

## 2024-07-08 RX ORDER — DICLOFENAC POTASSIUM 50 MG/1
50 TABLET, FILM COATED ORAL 3 TIMES DAILY
Qty: 30 TABLET | Refills: 0 | Status: SHIPPED | OUTPATIENT
Start: 2024-07-08 | End: 2024-07-18

## 2024-07-08 NOTE — TELEPHONE ENCOUNTER
Patient called having pain from stent, Tramadol is not helping and also having nausea and vomiting.

## 2024-07-12 ENCOUNTER — APPOINTMENT (OUTPATIENT)
Dept: UROLOGY | Facility: CLINIC | Age: 74
End: 2024-07-12
Payer: MEDICARE

## 2024-07-12 VITALS
SYSTOLIC BLOOD PRESSURE: 76 MMHG | WEIGHT: 125 LBS | DIASTOLIC BLOOD PRESSURE: 43 MMHG | HEIGHT: 65 IN | TEMPERATURE: 96.1 F | BODY MASS INDEX: 20.83 KG/M2 | HEART RATE: 77 BPM

## 2024-07-12 DIAGNOSIS — N20.0 NEPHROLITHIASIS: ICD-10-CM

## 2024-07-12 LAB
POC APPEARANCE, URINE: CLEAR
POC BILIRUBIN, URINE: ABNORMAL
POC BLOOD, URINE: ABNORMAL
POC COLOR, URINE: YELLOW
POC GLUCOSE, URINE: NEGATIVE MG/DL
POC KETONES, URINE: ABNORMAL MG/DL
POC LEUKOCYTES, URINE: ABNORMAL
POC NITRITE,URINE: NEGATIVE
POC PH, URINE: 5.5 PH
POC PROTEIN, URINE: ABNORMAL MG/DL
POC SPECIFIC GRAVITY, URINE: 1.02
POC UROBILINOGEN, URINE: 1 EU/DL

## 2024-07-12 PROCEDURE — 52310 CYSTOSCOPY AND TREATMENT: CPT | Performed by: UROLOGY

## 2024-07-12 PROCEDURE — 81003 URINALYSIS AUTO W/O SCOPE: CPT | Performed by: UROLOGY

## 2024-07-12 NOTE — PROGRESS NOTES
PRIOR NOTES  73-year-old female seeing me regarding possible nephrolithiasis, right flank pain  PMH: Hypertension, hyperlipidemia, prior STEMI, renovascular hypertension (EF 55% 2022), COPD, prior TIA, smoking, overactive bladder on Myrbetriq, history of lung cancer with prior resection, anxiety/depression     Patient has been seen by my colleague, Margot Espinoza, CNP for possible right-sided nephrolithiasis.  Patient reports in early April she had acute onset right lower back pain radiating towards her right lower quadrant.  Pain has been persistent, occasionally severe with the shooting pain which will bring her to her knees.  In between shooting pain episodes, pain is quite tolerable.  No fevers, chills, nausea, vomiting.     I reviewed her CT scan from April as well as her most recent ultrasound.  CT scan reveals possible small distal ureteral calculi, somewhat difficult to tell in this area whether these are calculi or phleboliths but she has mild upstream hydronephrosis and some nonobstructing stones in her right kidney 2 to 3 mm in size.  The renal ultrasound from June 7 reveals mild hydroureteronephrosis on the right side.  Creatinine 1.3, baseline  UA 5/23/2024 nitrite and leuk esterase negative, pH 5.5    OR 7/5/24 - L URS, no stones present, but pt had a very tight distal ureter c/w narrowing    UPDATED SUBJECTIVE HISTORY  07/12/24 -patient is felt rundown since surgery, mild right flank pain    Past Medical History  She has a past medical history of Anxiety, Anxiety disorder, unspecified, Calculus of kidney, Cataracts, bilateral, Chronic kidney disease, COPD (chronic obstructive pulmonary disease) (Multi), Coronary artery disease, Hypertension, Lung cancer (Multi), Major depressive disorder, recurrent, in partial remission (CMS-[facility]) (09/21/2021), Nephrolithiasis, Old myocardial infarction, Other conditions influencing health status, Other transient cerebral ischemic attacks and related syndromes,  Personal history of other diseases of the circulatory system, Personal history of other malignant neoplasm of bronchus and lung, Personal history of other specified conditions (01/10/2019), and Skin cancer.    Surgical History  She has a past surgical history that includes Other surgical history (08/20/2013); Other surgical history (08/20/2013); Lung lobectomy (04/22/2014); Other surgical history (04/22/2014); CT angio head w and wo IV contrast (11/26/2021); CT angio neck (11/26/2021); and CT angio neck (11/23/2022).     Social History  She reports that she has been smoking cigarettes. She started smoking about 52 years ago. She has a 26.3 pack-year smoking history. She has never used smokeless tobacco. She reports that she does not currently use alcohol. She reports that she does not currently use drugs.    Family History  Family History   Problem Relation Name Age of Onset    Heart disease Mother      Rectal cancer Mother      Other (cardiac disorder) Mother      Prostate cancer Father      Ovarian cancer Mother's Sister      Breast cancer Mother's Sister      Diabetes Maternal Grandmother      Diabetes Maternal Grandfather      Cancer Cousin          Allergies  Morphine, Myrbetriq [mirabegron], Oxycodone-acetaminophen, and Sulfa (sulfonamide antibiotics)    ROS: 12 system review was completed and is negative with the exception of those signs and symptoms noted in the history of present illness: A 12 system review was completed and is negative with the exception of those signs and symptoms noted in the history of present illness.     Exam:  General: in NAD, appears stated age  Head: normocephalic, atraumatic  Respiratory: normal effort, no use of accessory muscles  Cardiovascular: no edema noted  Skin: normal turgor, no rashes  Neurologic: grossly intact, oriented to person/place/time  Psychiatric: mode and affect appropriate    Patient ID: Edu Domingo is a 73 y.o. female.    Cystoscopy    Date/Time: 7/12/2024  "11:44 AM    Performed by: Osmar Monroe MD  Authorized by: Osmar Monroe MD    Procedure - Bladder Cystoscopy:     Procedure details: simple removal of a foreign body, stone, or stent    Comments:      Patient's genitalia were prepped and draped in the usual sterile fashion. A 15 Turkmen flexible cystoscope was passed atraumatically per the urethra. We entered the bladder and identified the previously placed ureteral stent.  It appeared to be completely within the bladder and I did not see any component of the stent sticking into his right kidney. It was grasped with a flexible grasper and removed intact. Patient tolerated the procedure without difficulty.           Last Recorded Vitals  Blood pressure (!) 76/43, pulse 77, temperature 35.6 °C (96.1 °F), height 1.651 m (5' 5\"), weight 56.7 kg (125 lb).    Lab Results   Component Value Date    CREATININE 1.21 (H) 06/25/2024    HGB 13.0 06/25/2024         ASSESSMENT/PLAN:  # Right ureteral stricture  -S/p dilation with the scope  -Stent was not in adequate position, likely had migrated out  -Follow-up with renal bladder ultrasound in 6 weeks    Osmar Monroe MD    "

## 2024-07-23 ENCOUNTER — TELEPHONE (OUTPATIENT)
Dept: PRIMARY CARE | Facility: CLINIC | Age: 74
End: 2024-07-23
Payer: MEDICARE

## 2024-07-23 DIAGNOSIS — F32.0 DEPRESSION, MAJOR, SINGLE EPISODE, MILD (CMS-HCC): ICD-10-CM

## 2024-07-23 DIAGNOSIS — I10 PRIMARY HYPERTENSION: ICD-10-CM

## 2024-07-23 DIAGNOSIS — I25.10 CAD S/P PERCUTANEOUS CORONARY ANGIOPLASTY: ICD-10-CM

## 2024-07-23 DIAGNOSIS — N20.1 CALCULUS OF RIGHT URETER: ICD-10-CM

## 2024-07-23 DIAGNOSIS — N20.0 KIDNEY STONES: ICD-10-CM

## 2024-07-23 DIAGNOSIS — R60.9 PERIPHERAL EDEMA: ICD-10-CM

## 2024-07-23 DIAGNOSIS — Z98.61 CAD S/P PERCUTANEOUS CORONARY ANGIOPLASTY: ICD-10-CM

## 2024-07-23 RX ORDER — AMLODIPINE BESYLATE 5 MG/1
5 TABLET ORAL DAILY
Qty: 30 TABLET | Refills: 2 | Status: SHIPPED | OUTPATIENT
Start: 2024-07-23 | End: 2025-01-19

## 2024-07-23 RX ORDER — ALPRAZOLAM 0.5 MG/1
0.5 TABLET ORAL 3 TIMES DAILY PRN
Qty: 90 TABLET | Refills: 0 | Status: SHIPPED | OUTPATIENT
Start: 2024-07-23

## 2024-07-23 RX ORDER — VENLAFAXINE HYDROCHLORIDE 37.5 MG/1
37.5 CAPSULE, EXTENDED RELEASE ORAL DAILY
Qty: 90 CAPSULE | Refills: 0 | Status: SHIPPED | OUTPATIENT
Start: 2024-07-23

## 2024-07-23 RX ORDER — LISINOPRIL 20 MG/1
20 TABLET ORAL DAILY
Qty: 30 TABLET | Refills: 1 | Status: SHIPPED | OUTPATIENT
Start: 2024-07-23

## 2024-07-23 RX ORDER — FUROSEMIDE 20 MG/1
20 TABLET ORAL DAILY
Qty: 30 TABLET | Refills: 0 | Status: SHIPPED | OUTPATIENT
Start: 2024-07-23 | End: 2025-07-23

## 2024-07-23 RX ORDER — VENLAFAXINE HYDROCHLORIDE 37.5 MG/1
37.5 CAPSULE, EXTENDED RELEASE ORAL DAILY
COMMUNITY
End: 2024-07-23 | Stop reason: SDUPTHER

## 2024-07-23 RX ORDER — ATORVASTATIN CALCIUM 80 MG/1
80 TABLET, FILM COATED ORAL DAILY
Qty: 90 TABLET | Refills: 1 | Status: SHIPPED | OUTPATIENT
Start: 2024-07-23

## 2024-07-23 RX ORDER — TAMSULOSIN HYDROCHLORIDE 0.4 MG/1
0.4 CAPSULE ORAL NIGHTLY
Qty: 30 CAPSULE | Refills: 0 | Status: SHIPPED | OUTPATIENT
Start: 2024-07-23 | End: 2024-08-22

## 2024-07-23 NOTE — TELEPHONE ENCOUNTER
Pt is asking if you can refill her Venlafaxine XR 37.5 MG? Pt stated that the pharmacy told her that the script was canceled by her doctor. I see that the script was discontinued because the pt stated she is not taking it. Pt states that she is still taking this and would like a refill.     REFILL  MEDICATION:     Furosemide 20 MG; Take 1 tablet once daily.    LR: 6/24/24     30 tablets with 0 refills     Venlafaxine XR 37.5 MG; Take 1 capsule once daily. Take with food.     LR: 4/9/24      90 capsules with 1 refill    Tamsulosin 0.4 MG; Take 1 capsule once daily at bedtime.    LR: 6/24/24      30 capsules with 0 refills     Alprazolam 0.5 MG; Take 1 tablet 3 times a day as needed for anxiety.    LR: 5/6/24       90 tablets with 0 refills     Amlodipine 5 MG; Take 1 tablet once daily.    LR: 5/6/24      30 tablets with 2 refills     Atorvastatin 80 MG; Take 1 tablet once daily.    LR: 12/18/23     90 tablets with 1 refill     Lisinopril 20 MG; Take 1 tablet once daily.    LR: 3/21/23      30 tablets with 1 refill     PHARM: [facility]   PHARM NUMBER: [phone]    LV: 6/24/24  NV: 9/6/24

## 2024-08-07 ENCOUNTER — HOSPITAL ENCOUNTER (OUTPATIENT)
Dept: RADIOLOGY | Facility: HOSPITAL | Age: 74
Discharge: HOME | End: 2024-08-07
Payer: MEDICARE

## 2024-08-07 DIAGNOSIS — M54.50 CHRONIC RIGHT-SIDED LOW BACK PAIN, UNSPECIFIED WHETHER SCIATICA PRESENT: ICD-10-CM

## 2024-08-07 DIAGNOSIS — G89.29 CHRONIC RIGHT-SIDED LOW BACK PAIN, UNSPECIFIED WHETHER SCIATICA PRESENT: ICD-10-CM

## 2024-08-07 PROCEDURE — 72148 MRI LUMBAR SPINE W/O DYE: CPT

## 2024-08-07 PROCEDURE — 72148 MRI LUMBAR SPINE W/O DYE: CPT | Performed by: RADIOLOGY

## 2024-08-08 ENCOUNTER — APPOINTMENT (OUTPATIENT)
Dept: CARDIOLOGY | Facility: CLINIC | Age: 74
End: 2024-08-08
Payer: MEDICARE

## 2024-08-09 ENCOUNTER — OFFICE VISIT (OUTPATIENT)
Dept: PRIMARY CARE | Facility: CLINIC | Age: 74
End: 2024-08-09
Payer: MEDICARE

## 2024-08-09 VITALS
SYSTOLIC BLOOD PRESSURE: 106 MMHG | BODY MASS INDEX: 20.47 KG/M2 | TEMPERATURE: 97 F | WEIGHT: 123 LBS | DIASTOLIC BLOOD PRESSURE: 60 MMHG

## 2024-08-09 DIAGNOSIS — D33.4 SCHWANNOMA OF SPINAL CORD (MULTI): Primary | ICD-10-CM

## 2024-08-09 PROCEDURE — 3074F SYST BP LT 130 MM HG: CPT | Performed by: FAMILY MEDICINE

## 2024-08-09 PROCEDURE — 3078F DIAST BP <80 MM HG: CPT | Performed by: FAMILY MEDICINE

## 2024-08-09 PROCEDURE — 1160F RVW MEDS BY RX/DR IN RCRD: CPT | Performed by: FAMILY MEDICINE

## 2024-08-09 PROCEDURE — 4004F PT TOBACCO SCREEN RCVD TLK: CPT | Performed by: FAMILY MEDICINE

## 2024-08-09 PROCEDURE — 1159F MED LIST DOCD IN RCRD: CPT | Performed by: FAMILY MEDICINE

## 2024-08-09 PROCEDURE — 99214 OFFICE O/P EST MOD 30 MIN: CPT | Performed by: FAMILY MEDICINE

## 2024-08-09 PROCEDURE — 1124F ACP DISCUSS-NO DSCNMKR DOCD: CPT | Performed by: FAMILY MEDICINE

## 2024-08-09 RX ORDER — TRAMADOL HYDROCHLORIDE 50 MG/1
50 TABLET ORAL EVERY 8 HOURS PRN
Qty: 15 TABLET | Refills: 0 | Status: SHIPPED | OUTPATIENT
Start: 2024-08-09 | End: 2024-08-14

## 2024-08-09 NOTE — PATIENT INSTRUCTIONS
I will order an MRI of the lumbar spine with contrast.  Keep your appointment with Dr Johnson for the pain to consider an injection.  If the MRI shows a possible Schwannoma, then a neurosurgery consult will be ordered.

## 2024-08-09 NOTE — PROGRESS NOTES
"Subjective   Patient ID: [id number]     Edu Domingo is a 73 y.o. female who presents for Follow-up (Discuss MRI results.).  HPI  She is here for a discussion on MRI results.  She had an MRI of the lumbar spine yesterday.      Report from MRI lumbar was reviewed.  There is degenerative change with osteophytic spurring and disc bulging with spinal and foraminal stenosis.      There is also a 4 mm focus seen on just one view at the thecal sac at L5.  The may be a schwannoma or it may be artifactual in nature.    She complains of pain at the top of the right hip.  Points to the right SI joint.  No pain, numbness or tingling down the legs at all.  She sometimes \"wants to get a cramp when I am walking.\"      Objective     /60 (BP Location: Right arm, Patient Position: Sitting)   Temp 36.1 °C (97 °F) (Skin)   Wt 55.8 kg (123 lb)   LMP  (LMP Unknown)   BMI 20.47 kg/m²      Physical Exam  Musculoskeletal:      Right lower leg: No edema.      Left lower leg: No edema.      Comments: Tender at the right sacroiliac region.       Neurological:      General: No focal deficit present.      Mental Status: She is alert.      Sensory: No sensory deficit (SLR neg.).         Assessment/Plan   Problem List Items Addressed This Visit    None  Visit Diagnoses       Schwannoma of spinal cord (Multi)    -  Primary    Relevant Medications    traMADol (Ultram) 50 mg tablet    Other Relevant Orders    MR lumbar spine w and wo IV contrast          I will order an MRI of the lumbar spine with contrast.  Keep your appointment with Dr Johnson for the pain to consider an injection.  If the MRI shows a possible Schwannoma, then a neurosurgery consult will be ordered.  Abram Salmeron,    "

## 2024-08-23 ENCOUNTER — HOSPITAL ENCOUNTER (OUTPATIENT)
Dept: RADIOLOGY | Facility: HOSPITAL | Age: 74
Discharge: HOME | End: 2024-08-23
Payer: MEDICARE

## 2024-08-23 VITALS — BODY MASS INDEX: 19.99 KG/M2 | WEIGHT: 120 LBS | HEIGHT: 65 IN

## 2024-08-23 DIAGNOSIS — D33.4 SCHWANNOMA OF SPINAL CORD (MULTI): ICD-10-CM

## 2024-08-23 PROCEDURE — 2550000001 HC RX 255 CONTRASTS: Performed by: FAMILY MEDICINE

## 2024-08-23 PROCEDURE — A9575 INJ GADOTERATE MEGLUMI 0.1ML: HCPCS | Performed by: FAMILY MEDICINE

## 2024-08-23 PROCEDURE — 72158 MRI LUMBAR SPINE W/O & W/DYE: CPT

## 2024-08-23 RX ORDER — GADOTERATE MEGLUMINE 376.9 MG/ML
10 INJECTION INTRAVENOUS
Status: COMPLETED | OUTPATIENT
Start: 2024-08-23 | End: 2024-08-23

## 2024-08-26 DIAGNOSIS — G95.89 SPINAL CORD MASS (MULTI): Primary | ICD-10-CM

## 2024-08-28 NOTE — PROGRESS NOTES
I spoke with patient today after communicating with Dr Faulkner, neurology.  He suggested that she go to the ER at [facility] for further work up of her MRI findings.      She is still in a lot of pain.  She agrees to go the [facility] ER for further work up of her enlarging spinal masses looking for inflammatory or neoplastic etiology.

## 2024-08-29 ENCOUNTER — APPOINTMENT (OUTPATIENT)
Dept: CARDIOLOGY | Facility: CLINIC | Age: 74
End: 2024-08-29
Payer: MEDICARE

## 2024-08-30 ENCOUNTER — APPOINTMENT (OUTPATIENT)
Dept: RADIOLOGY | Facility: HOSPITAL | Age: 74
DRG: 566 | End: 2024-08-30
Payer: MEDICARE

## 2024-08-30 ENCOUNTER — HOSPITAL ENCOUNTER (INPATIENT)
Facility: HOSPITAL | Age: 74
LOS: 1 days | Discharge: HOME | DRG: 566 | End: 2024-08-31
Attending: EMERGENCY MEDICINE | Admitting: INTERNAL MEDICINE
Payer: MEDICARE

## 2024-08-30 DIAGNOSIS — R53.1 GENERAL WEAKNESS: ICD-10-CM

## 2024-08-30 DIAGNOSIS — D49.7: ICD-10-CM

## 2024-08-30 DIAGNOSIS — G95.9 SPINAL CORD LESION (MULTI): Primary | ICD-10-CM

## 2024-08-30 LAB
ALBUMIN SERPL BCP-MCNC: 5.1 G/DL (ref 3.4–5)
ALP SERPL-CCNC: 109 U/L (ref 33–136)
ALT SERPL W P-5'-P-CCNC: 17 U/L (ref 7–45)
ANION GAP SERPL CALC-SCNC: 18 MMOL/L (ref 10–20)
AST SERPL W P-5'-P-CCNC: 20 U/L (ref 9–39)
BASOPHILS # BLD AUTO: 0.12 X10*3/UL (ref 0–0.1)
BASOPHILS NFR BLD AUTO: 1.3 %
BILIRUB DIRECT SERPL-MCNC: 0.2 MG/DL (ref 0–0.3)
BILIRUB SERPL-MCNC: 0.7 MG/DL (ref 0–1.2)
BUN SERPL-MCNC: 20 MG/DL (ref 6–23)
CALCIUM SERPL-MCNC: 9.9 MG/DL (ref 8.6–10.6)
CHLORIDE SERPL-SCNC: 97 MMOL/L (ref 98–107)
CO2 SERPL-SCNC: 27 MMOL/L (ref 21–32)
CREAT SERPL-MCNC: 1.14 MG/DL (ref 0.5–1.05)
CRP SERPL-MCNC: 0.14 MG/DL
EGFRCR SERPLBLD CKD-EPI 2021: 52 ML/MIN/1.73M*2
EOSINOPHIL # BLD AUTO: 0.28 X10*3/UL (ref 0–0.4)
EOSINOPHIL NFR BLD AUTO: 3.1 %
ERYTHROCYTE [DISTWIDTH] IN BLOOD BY AUTOMATED COUNT: 14.8 % (ref 11.5–14.5)
ERYTHROCYTE [SEDIMENTATION RATE] IN BLOOD BY WESTERGREN METHOD: 5 MM/H (ref 0–30)
EST. AVERAGE GLUCOSE BLD GHB EST-MCNC: 123 MG/DL
GLUCOSE SERPL-MCNC: 99 MG/DL (ref 74–99)
HBA1C MFR BLD: 5.9 %
HCT VFR BLD AUTO: 43.8 % (ref 36–46)
HGB BLD-MCNC: 14.9 G/DL (ref 12–16)
HIV 1+2 AB+HIV1 P24 AG SERPL QL IA: NONREACTIVE
IMM GRANULOCYTES # BLD AUTO: 0.02 X10*3/UL (ref 0–0.5)
IMM GRANULOCYTES NFR BLD AUTO: 0.2 % (ref 0–0.9)
LYMPHOCYTES # BLD AUTO: 3.12 X10*3/UL (ref 0.8–3)
LYMPHOCYTES NFR BLD AUTO: 34.6 %
MCH RBC QN AUTO: 29.4 PG (ref 26–34)
MCHC RBC AUTO-ENTMCNC: 34 G/DL (ref 32–36)
MCV RBC AUTO: 86 FL (ref 80–100)
MONOCYTES # BLD AUTO: 0.63 X10*3/UL (ref 0.05–0.8)
MONOCYTES NFR BLD AUTO: 7 %
NEUTROPHILS # BLD AUTO: 4.86 X10*3/UL (ref 1.6–5.5)
NEUTROPHILS NFR BLD AUTO: 53.8 %
NRBC BLD-RTO: 0 /100 WBCS (ref 0–0)
PLATELET # BLD AUTO: 269 X10*3/UL (ref 150–450)
POTASSIUM SERPL-SCNC: 4.2 MMOL/L (ref 3.5–5.3)
PROT SERPL-MCNC: 7.8 G/DL (ref 6.4–8.2)
PROT SERPL-MCNC: 7.9 G/DL (ref 6.4–8.2)
RBC # BLD AUTO: 5.07 X10*6/UL (ref 4–5.2)
SODIUM SERPL-SCNC: 138 MMOL/L (ref 136–145)
TSH SERPL-ACNC: 1.04 MIU/L (ref 0.44–3.98)
WBC # BLD AUTO: 9 X10*3/UL (ref 4.4–11.3)

## 2024-08-30 PROCEDURE — 72125 CT NECK SPINE W/O DYE: CPT

## 2024-08-30 PROCEDURE — 72125 CT NECK SPINE W/O DYE: CPT | Performed by: RADIOLOGY

## 2024-08-30 PROCEDURE — 87389 HIV-1 AG W/HIV-1&-2 AB AG IA: CPT

## 2024-08-30 PROCEDURE — 36415 COLL VENOUS BLD VENIPUNCTURE: CPT | Performed by: EMERGENCY MEDICINE

## 2024-08-30 PROCEDURE — 99285 EMERGENCY DEPT VISIT HI MDM: CPT

## 2024-08-30 PROCEDURE — 71260 CT THORAX DX C+: CPT | Performed by: STUDENT IN AN ORGANIZED HEALTH CARE EDUCATION/TRAINING PROGRAM

## 2024-08-30 PROCEDURE — 83036 HEMOGLOBIN GLYCOSYLATED A1C: CPT

## 2024-08-30 PROCEDURE — 85025 COMPLETE CBC W/AUTO DIFF WBC: CPT | Performed by: EMERGENCY MEDICINE

## 2024-08-30 PROCEDURE — 85652 RBC SED RATE AUTOMATED: CPT | Performed by: EMERGENCY MEDICINE

## 2024-08-30 PROCEDURE — 2550000001 HC RX 255 CONTRASTS: Performed by: EMERGENCY MEDICINE

## 2024-08-30 PROCEDURE — 70450 CT HEAD/BRAIN W/O DYE: CPT

## 2024-08-30 PROCEDURE — 82248 BILIRUBIN DIRECT: CPT

## 2024-08-30 PROCEDURE — 99285 EMERGENCY DEPT VISIT HI MDM: CPT | Performed by: EMERGENCY MEDICINE

## 2024-08-30 PROCEDURE — 84443 ASSAY THYROID STIM HORMONE: CPT

## 2024-08-30 PROCEDURE — 2500000001 HC RX 250 WO HCPCS SELF ADMINISTERED DRUGS (ALT 637 FOR MEDICARE OP)

## 2024-08-30 PROCEDURE — 74177 CT ABD & PELVIS W/CONTRAST: CPT

## 2024-08-30 PROCEDURE — 1210000001 HC SEMI-PRIVATE ROOM DAILY

## 2024-08-30 PROCEDURE — 80048 BASIC METABOLIC PNL TOTAL CA: CPT | Performed by: EMERGENCY MEDICINE

## 2024-08-30 PROCEDURE — 94640 AIRWAY INHALATION TREATMENT: CPT

## 2024-08-30 PROCEDURE — 2500000002 HC RX 250 W HCPCS SELF ADMINISTERED DRUGS (ALT 637 FOR MEDICARE OP, ALT 636 FOR OP/ED)

## 2024-08-30 PROCEDURE — 72128 CT CHEST SPINE W/O DYE: CPT | Mod: RCN

## 2024-08-30 PROCEDURE — 86140 C-REACTIVE PROTEIN: CPT | Performed by: EMERGENCY MEDICINE

## 2024-08-30 PROCEDURE — 72110 X-RAY EXAM L-2 SPINE 4/>VWS: CPT | Performed by: RADIOLOGY

## 2024-08-30 PROCEDURE — 72131 CT LUMBAR SPINE W/O DYE: CPT | Mod: RCN

## 2024-08-30 PROCEDURE — 84155 ASSAY OF PROTEIN SERUM: CPT

## 2024-08-30 PROCEDURE — 70450 CT HEAD/BRAIN W/O DYE: CPT | Performed by: RADIOLOGY

## 2024-08-30 PROCEDURE — 72110 X-RAY EXAM L-2 SPINE 4/>VWS: CPT

## 2024-08-30 PROCEDURE — 74177 CT ABD & PELVIS W/CONTRAST: CPT | Performed by: STUDENT IN AN ORGANIZED HEALTH CARE EDUCATION/TRAINING PROGRAM

## 2024-08-30 RX ORDER — ACETAMINOPHEN 325 MG/1
975 TABLET ORAL ONCE
Status: DISCONTINUED | OUTPATIENT
Start: 2024-08-30 | End: 2024-08-30

## 2024-08-30 RX ORDER — FUROSEMIDE 20 MG/1
20 TABLET ORAL DAILY
Status: DISCONTINUED | OUTPATIENT
Start: 2024-08-30 | End: 2024-08-31 | Stop reason: HOSPADM

## 2024-08-30 RX ORDER — ALBUTEROL SULFATE 0.83 MG/ML
2.5 SOLUTION RESPIRATORY (INHALATION) EVERY 4 HOURS PRN
Status: DISCONTINUED | OUTPATIENT
Start: 2024-08-30 | End: 2024-08-31 | Stop reason: HOSPADM

## 2024-08-30 RX ORDER — VENLAFAXINE HYDROCHLORIDE 37.5 MG/1
37.5 CAPSULE, EXTENDED RELEASE ORAL DAILY
Status: DISCONTINUED | OUTPATIENT
Start: 2024-08-30 | End: 2024-08-31 | Stop reason: HOSPADM

## 2024-08-30 RX ORDER — TRAMADOL HYDROCHLORIDE 50 MG/1
25 TABLET ORAL EVERY 8 HOURS PRN
Status: DISCONTINUED | OUTPATIENT
Start: 2024-08-30 | End: 2024-08-30

## 2024-08-30 RX ORDER — AMLODIPINE BESYLATE 5 MG/1
5 TABLET ORAL DAILY
Status: DISCONTINUED | OUTPATIENT
Start: 2024-08-30 | End: 2024-08-31 | Stop reason: HOSPADM

## 2024-08-30 RX ORDER — FORMOTEROL FUMARATE DIHYDRATE 20 UG/2ML
20 SOLUTION RESPIRATORY (INHALATION)
Status: DISCONTINUED | OUTPATIENT
Start: 2024-08-30 | End: 2024-08-31 | Stop reason: HOSPADM

## 2024-08-30 RX ORDER — HEPARIN SODIUM 5000 [USP'U]/ML
5000 INJECTION, SOLUTION INTRAVENOUS; SUBCUTANEOUS EVERY 8 HOURS
Status: DISCONTINUED | OUTPATIENT
Start: 2024-08-30 | End: 2024-08-31 | Stop reason: HOSPADM

## 2024-08-30 RX ORDER — LISINOPRIL 20 MG/1
20 TABLET ORAL DAILY
Status: DISCONTINUED | OUTPATIENT
Start: 2024-08-30 | End: 2024-08-31 | Stop reason: HOSPADM

## 2024-08-30 RX ORDER — LANOLIN ALCOHOL/MO/W.PET/CERES
400 CREAM (GRAM) TOPICAL DAILY
Status: DISCONTINUED | OUTPATIENT
Start: 2024-08-30 | End: 2024-08-31 | Stop reason: HOSPADM

## 2024-08-30 RX ORDER — ACETAMINOPHEN 325 MG/1
975 TABLET ORAL EVERY 8 HOURS PRN
Status: DISCONTINUED | OUTPATIENT
Start: 2024-08-30 | End: 2024-08-31 | Stop reason: HOSPADM

## 2024-08-30 RX ORDER — ALPRAZOLAM 0.25 MG/1
0.5 TABLET ORAL NIGHTLY PRN
Status: DISCONTINUED | OUTPATIENT
Start: 2024-08-30 | End: 2024-08-31 | Stop reason: HOSPADM

## 2024-08-30 RX ORDER — ASPIRIN 81 MG/1
81 TABLET ORAL DAILY
Status: DISCONTINUED | OUTPATIENT
Start: 2024-08-30 | End: 2024-08-31 | Stop reason: HOSPADM

## 2024-08-30 RX ORDER — ATORVASTATIN CALCIUM 80 MG/1
80 TABLET, FILM COATED ORAL NIGHTLY
Status: DISCONTINUED | OUTPATIENT
Start: 2024-08-30 | End: 2024-08-31 | Stop reason: HOSPADM

## 2024-08-30 RX ORDER — CITALOPRAM 40 MG/1
40 TABLET, FILM COATED ORAL DAILY
Status: DISCONTINUED | OUTPATIENT
Start: 2024-08-30 | End: 2024-08-31 | Stop reason: HOSPADM

## 2024-08-30 RX ORDER — CARVEDILOL 25 MG/1
25 TABLET ORAL
Status: DISCONTINUED | OUTPATIENT
Start: 2024-08-30 | End: 2024-08-31 | Stop reason: HOSPADM

## 2024-08-30 RX ORDER — POLYETHYLENE GLYCOL 3350 17 G/17G
17 POWDER, FOR SOLUTION ORAL DAILY
Status: DISCONTINUED | OUTPATIENT
Start: 2024-08-30 | End: 2024-08-31 | Stop reason: HOSPADM

## 2024-08-30 ASSESSMENT — ENCOUNTER SYMPTOMS
UNEXPECTED WEIGHT CHANGE: 1
ROS GI COMMENTS: BOWEL URGENCY
MYALGIAS: 0
DIFFICULTY URINATING: 1
DYSURIA: 0
BLOOD IN STOOL: 0
BACK PAIN: 1
SEIZURES: 0
FEVER: 0
VOMITING: 0
POLYPHAGIA: 0
LIGHT-HEADEDNESS: 1
WEAKNESS: 1
APPETITE CHANGE: 1
FATIGUE: 1
CHEST TIGHTNESS: 0
CONSTIPATION: 0
BRUISES/BLEEDS EASILY: 1
ANAL BLEEDING: 0
NAUSEA: 1
CHILLS: 0

## 2024-08-30 ASSESSMENT — LIFESTYLE VARIABLES
HAVE PEOPLE ANNOYED YOU BY CRITICIZING YOUR DRINKING: NO
HAVE YOU EVER FELT YOU SHOULD CUT DOWN ON YOUR DRINKING: NO
EVER HAD A DRINK FIRST THING IN THE MORNING TO STEADY YOUR NERVES TO GET RID OF A HANGOVER: NO
TOTAL SCORE: 0
EVER FELT BAD OR GUILTY ABOUT YOUR DRINKING: NO

## 2024-08-30 ASSESSMENT — COLUMBIA-SUICIDE SEVERITY RATING SCALE - C-SSRS
1. IN THE PAST MONTH, HAVE YOU WISHED YOU WERE DEAD OR WISHED YOU COULD GO TO SLEEP AND NOT WAKE UP?: NO
2. HAVE YOU ACTUALLY HAD ANY THOUGHTS OF KILLING YOURSELF?: NO
6. HAVE YOU EVER DONE ANYTHING, STARTED TO DO ANYTHING, OR PREPARED TO DO ANYTHING TO END YOUR LIFE?: NO

## 2024-08-30 ASSESSMENT — PAIN SCALES - GENERAL
PAINLEVEL_OUTOF10: 4
PAINLEVEL_OUTOF10: 9

## 2024-08-30 ASSESSMENT — PAIN - FUNCTIONAL ASSESSMENT: PAIN_FUNCTIONAL_ASSESSMENT: 0-10

## 2024-08-30 ASSESSMENT — PAIN DESCRIPTION - LOCATION: LOCATION: BACK

## 2024-08-30 ASSESSMENT — PAIN DESCRIPTION - PAIN TYPE: TYPE: ACUTE PAIN

## 2024-08-30 NOTE — H&P
"History Of Present Illness  Edu Domingo is a 73 y.o. female presenting with adenocarcinoma of the lung s/p DIXIE lobectomy, breast lump, ischemic cardiomyopathy (resolved), CAD with RCA PCI, nonsustained VT, TIA, COPD, HTN depression, anxiety, HLD here with back pain.    Patient first noticed back/R flank pain a few months ago. Hx of kidney stones, was referred to urology, underwent R ureteroscopy and lithotripsy on 7/5/2024. Flank pain improved post-procedure but back pain persisted. Was taking tylenol with some relief, additionally helped by tramadol from PCP. Pain is worse with flexion, sitting without support.     Had MRI Lspine w/o contrast 8/8 showing degenerative changes with spurs and disc bulging. Then MRI Lspine with contrast 8/23/24, showing rounded masses in L4 and L5 in the cauda equina. Around this time patient reports that she had issues walking, R leg worse than L, \"feels like they're going to give out\". Having more bowel urgency, no episodes of incontinence. Also noted occasional numbness, tingling in R hand. Her PCP wanted her to see neurology, but couldn't get appointment for three months. Decision was made then to come to ED for expedited workup.     Past Medical History  She has a past medical history of Anxiety, Anxiety disorder, unspecified, Calculus of kidney, Cataracts, bilateral, Chronic kidney disease, COPD (chronic obstructive pulmonary disease) (Multi), Coronary artery disease, Hypertension, Lung cancer (Multi), Major depressive disorder, recurrent, in partial remission (CMS-HCC) (09/21/2021), Nephrolithiasis, Old myocardial infarction, Other conditions influencing health status, Other transient cerebral ischemic attacks and related syndromes, Personal history of other diseases of the circulatory system, Personal history of other malignant neoplasm of bronchus and lung, Personal history of other specified conditions (01/10/2019), and Skin cancer.    Surgical History  She has a past " surgical history that includes Other surgical history (08/20/2013); Other surgical history (08/20/2013); Lung lobectomy (04/22/2014); Other surgical history (04/22/2014); CT angio head w and wo IV contrast (11/26/2021); CT angio neck (11/26/2021); and CT angio neck (11/23/2022).     Social History  She reports that she has been smoking cigarettes. She started smoking about 52 years ago. She has a 26.3 pack-year smoking history. She has never used smokeless tobacco. She reports that she does not currently use alcohol. She reports that she does not currently use drugs.    Family History  Family History   Problem Relation Name Age of Onset    Heart disease Mother      Rectal cancer Mother      Other (cardiac disorder) Mother      Prostate cancer Father      Ovarian cancer Mother's Sister      Breast cancer Mother's Sister      Diabetes Maternal Grandmother      Diabetes Maternal Grandfather      Cancer Cousin       Review of Systems   Constitutional:  Positive for appetite change, fatigue and unexpected weight change (15 lbs). Negative for chills and fever.   Respiratory:  Negative for chest tightness.    Cardiovascular:  Negative for chest pain and leg swelling.   Gastrointestinal:  Positive for nausea. Negative for anal bleeding, blood in stool, constipation and vomiting.        Bowel urgency   Endocrine: Negative for polyphagia and polyuria.   Genitourinary:  Positive for difficulty urinating. Negative for dysuria and urgency.   Musculoskeletal:  Positive for back pain. Negative for myalgias.   Neurological:  Positive for weakness (legs, R>L) and light-headedness. Negative for seizures and syncope.   Hematological:  Bruises/bleeds easily.   Psychiatric/Behavioral:  Negative for behavioral problems.      Allergies  Morphine, Myrbetriq [mirabegron], Oxycodone-acetaminophen, and Sulfa (sulfonamide antibiotics)     Physical Exam  Constitutional:       General: She is not in acute distress.     Appearance: Normal  appearance.   Cardiovascular:      Rate and Rhythm: Normal rate and regular rhythm.      Pulses: Normal pulses.      Heart sounds: Normal heart sounds. No murmur heard.     No friction rub. No gallop.   Pulmonary:      Effort: Pulmonary effort is normal. No respiratory distress.      Breath sounds: Normal breath sounds.   Abdominal:      General: Abdomen is flat. There is no distension.      Palpations: Abdomen is soft.   Musculoskeletal:         General: No swelling or tenderness. Normal range of motion.   Skin:     General: Skin is warm and dry.      Capillary Refill: Capillary refill takes less than 2 seconds.      Findings: Bruising present.   Neurological:      Mental Status: She is alert and oriented to person, place, and time.      GCS: GCS eye subscore is 4. GCS verbal subscore is 5. GCS motor subscore is 6.      Cranial Nerves: Cranial nerves 2-12 are intact.      Sensory: Sensation is intact.      Motor: Motor function is intact. No weakness, tremor or abnormal muscle tone.      Coordination: Coordination is intact.      Deep Tendon Reflexes:      Reflex Scores:       Patellar reflexes are 2+ on the right side and 2+ on the left side.       Achilles reflexes are 2+ on the right side and 2+ on the left side.     Comments: Normal rectal tone   Psychiatric:         Mood and Affect: Mood normal.         Behavior: Behavior normal.       Last Recorded Vitals  /57   Pulse 66   Temp 35.6 °C (96 °F) (Temporal)   Resp 18   Wt 54 kg (119 lb)   SpO2 100%     Relevant Results  Results for orders placed or performed during the hospital encounter of 08/30/24 (from the past 24 hour(s))   CBC and Auto Differential   Result Value Ref Range    WBC 9.0 4.4 - 11.3 x10*3/uL    nRBC 0.0 0.0 - 0.0 /100 WBCs    RBC 5.07 4.00 - 5.20 x10*6/uL    Hemoglobin 14.9 12.0 - 16.0 g/dL    Hematocrit 43.8 36.0 - 46.0 %    MCV 86 80 - 100 fL    MCH 29.4 26.0 - 34.0 pg    MCHC 34.0 32.0 - 36.0 g/dL    RDW 14.8 (H) 11.5 - 14.5 %     Platelets 269 150 - 450 x10*3/uL    Neutrophils % 53.8 40.0 - 80.0 %    Immature Granulocytes %, Automated 0.2 0.0 - 0.9 %    Lymphocytes % 34.6 13.0 - 44.0 %    Monocytes % 7.0 2.0 - 10.0 %    Eosinophils % 3.1 0.0 - 6.0 %    Basophils % 1.3 0.0 - 2.0 %    Neutrophils Absolute 4.86 1.60 - 5.50 x10*3/uL    Immature Granulocytes Absolute, Automated 0.02 0.00 - 0.50 x10*3/uL    Lymphocytes Absolute 3.12 (H) 0.80 - 3.00 x10*3/uL    Monocytes Absolute 0.63 0.05 - 0.80 x10*3/uL    Eosinophils Absolute 0.28 0.00 - 0.40 x10*3/uL    Basophils Absolute 0.12 (H) 0.00 - 0.10 x10*3/uL   Basic metabolic panel   Result Value Ref Range    Glucose 99 74 - 99 mg/dL    Sodium 138 136 - 145 mmol/L    Potassium 4.2 3.5 - 5.3 mmol/L    Chloride 97 (L) 98 - 107 mmol/L    Bicarbonate 27 21 - 32 mmol/L    Anion Gap 18 10 - 20 mmol/L    Urea Nitrogen 20 6 - 23 mg/dL    Creatinine 1.14 (H) 0.50 - 1.05 mg/dL    eGFR 52 (L) >60 mL/min/1.73m*2    Calcium 9.9 8.6 - 10.6 mg/dL   Sedimentation Rate   Result Value Ref Range    Sedimentation Rate 5 0 - 30 mm/h   C-reactive protein   Result Value Ref Range    C-Reactive Protein 0.14 <1.00 mg/dL   Hepatic function panel   Result Value Ref Range    Albumin 5.1 (H) 3.4 - 5.0 g/dL    Bilirubin, Total 0.7 0.0 - 1.2 mg/dL    Bilirubin, Direct 0.2 0.0 - 0.3 mg/dL    Alkaline Phosphatase 109 33 - 136 U/L    ALT 17 7 - 45 U/L    AST 20 9 - 39 U/L    Total Protein 7.9 6.4 - 8.2 g/dL   Hemoglobin A1c   Result Value Ref Range    Hemoglobin A1C 5.9 (H) see below %    Estimated Average Glucose 123 Not Established mg/dL   Protein, total   Result Value Ref Range    Total Protein 7.8 6.4 - 8.2 g/dL     Scheduled medications  amLODIPine, 5 mg, oral, Daily  [Held by provider] aspirin, 81 mg, oral, Daily  atorvastatin, 80 mg, oral, Nightly  carvedilol, 25 mg, oral, BID  [Held by provider] citalopram, 40 mg, oral, Daily  tiotropium, 2 puff, inhalation, Daily   And  formoterol, 20 mcg, nebulization, q12h  furosemide, 20  mg, oral, Daily  heparin (porcine), 5,000 Units, subcutaneous, q8h  [Held by provider] lisinopril, 20 mg, oral, Daily  magnesium oxide, 400 mg, oral, Daily  polyethylene glycol, 17 g, oral, Daily  venlafaxine XR, 37.5 mg, oral, Daily      Continuous medications     PRN medications  PRN medications: acetaminophen, albuterol, ALPRAZolam    CT chest abdomen pelvis w IV contrast    Result Date: 8/30/2024  Interpreted By:  Anika Cornejo,  and Michael Morgan STUDY: CT CHEST ABDOMEN PELVIS W IV CONTRAST;  8/30/2024 3:42 pm   INDICATION: Signs/Symptoms:MRI with potential spinal tumor, assess for primary lesion, hx of remote lung AC   COMPARISON: CT abdomen and pelvis from 04/05/2024. MRI lumbar spine from 08/22/2024, CT chest from 05/23/2023   ACCESSION NUMBER(S): [id number]   ORDERING CLINICIAN: FERN LAMB   TECHNIQUE: CT of the chest, abdomen, and pelvis was performed. Contiguous axial images were obtained at  5 mm slice thickness through the chest, and at  3 mm through the abdomen and pelvis. Coronal and sagittal reconstructions at  3 mm slice thickness were performed. 100 ml of contrast material Omnipaque were administered intravenously without immediate complication.   FINDINGS: CHEST:   LUNG/PLEURA/LARGE AIRWAYS: No air space opacity, focal consolidation, pleural effusion/hemothorax, or pneumothorax are appreciated. There are surgical changes of prior left upper lung lobectomy are seen, which are similar to prior imaging. There is similar-appearing of a 0.9 cm ground-glass opacity adjacent to the heart. (Series 204, image 194) This appears similar to prior imaging from chest CT in 2023.   VESSELS: No traumatic aortic injury is appreciated within the limitations of this non-EKG gated study.  The thoracic aorta is of normal course and caliber.  Main pulmonary artery and its branches are normal in caliber.  No coronary artery calcifications are seen.   HEART: The heart is normal in size.   There is no pericardial  effusion.   MEDIASTINUM AND MIRNA: No pneumomediastinum, abnormal mediastinal fluid collection or mediastinal hematoma are appreciated.  No mediastinal, hilar or biaxillary adenopathy is present.  The esophagus is normal in course and caliber.   CHEST WALL AND LOWER NECK: No acute fracture or dislocation of the included osseous structures are appreciated. Bilateral breast implants noted. No suspicious osseous lesions are identified.  There is a 1.3 cm thyroid nodule of the left lobe which appears similar to prior imaging. (Series 201, image 11).   ABDOMEN:   LIVER: No focal perfusion abnormality of the liver is appreciated to suggest contusion or laceration. There is no subcapsular hematoma, no perihepatic fluid collection.  The liver enhances homogeneously. There are no focal lesions.   GALLBLADDER: The gallbladder is nondistended. Cholelithiasis is present.   BILE DUCTS: The intahepatic and extrahepatic bile ducts are not dilated.   PANCREAS: The pancreas appears unremarkable.   SPLEEN: No parenchymal perfusion deficit of the spleen is appreciated to suggest contusion or laceration. There is no subcapsular hematoma, no perisplenic fluid collection.   ADRENAL GLANDS: The bilateral adrenal glands are unremarkable in appearance. There is mild thickening of the left adrenal gland measuring 0.9 cm in diameter which is similar to prior study.   KIDNEYS AND URETERS: No parenchymal perfusion deficit is appreciated in bilateral kidneys to suggest contusion or laceration. There is no subcapsular hematoma, no perinephric fluid collection.  Similar-appearing nephrolithiasis of the right inferior renal pole with renal calculus measuring 0.3 cm (series 201, image 127) and 0.2 cm bracket. (Series 202, image 77). There is similar mild hydronephrosis of the right renal pelvis. (Series 201, image 118).   PELVIS:   BLADDER: The urinary bladder appears within normal limits.   REPRODUCTIVE ORGANS: The uterus is present.   BOWEL: The  stomach is unremarkable.  The small bowel is normal in caliber without evidence of focal wall thickening or obstruction.  There is no evidence of focal wall thickening or dilatation of the large bowel.  The appendix is not definitely visualized, although there are no pericecal inflammatory changes to suggest acute appendicitis.   VESSELS: The aorta and IVC are within normal limits.  The principal vasculature of the abdomen and pelvis is patent.   There is moderate atherosclerotic calcification of the abdominal aorta and its branches.   PERITONEUM/RETROPERITONEUM/LYMPH NODES: There is no evidence of intra- or retroperitoneal hematoma.  There is no free or loculated fluid collection, no free intraperitoneal air. No abdominopelvic lymphadenopathy is present.   BONES AND ABDOMINAL WALL: No evidence of acute fracture or dislocation of the included osseous structures. There is a grade 1 anterolisthesis of L4 on L5. The L4/L5 tumor identified on MRI is not well visualized on the study. The abdominal wall soft tissues appear normal.       CHEST 1.  Similar-appearing surgical changes of the left upper lobe lobectomy. 2. Stable 0.9 cm peripheral left lower lobe nodular density compared to 05/23/2023 CT. 3. No evidence of new intrathoracic lymphadenopathy or metastatic disease. 4. Additional chronic findings as described above. Please refer to concurrently imaged and separately dictated CT of the thoracic spine.   ABDOMEN - PELVIS 1. No CT evidence of acute abdominopelvic abnormality, lymphadenopathy or new metastatic disease. 2. Multiple renal calculi of the right kidney with mild hydronephrosis of the right kidney which is unchanged from prior imaging. 3. Please refer to concurrently imaged and separately dictated CT of the lumbar spine. 4. Additional chronic findings as described above.   I personally reviewed the images/study and I agree with the findings as stated by Cathy Rodriguez MD, PGY-2 this study was interpreted at  [place], [place].   MACRO: None   Signed by: Anika Cornejo 8/30/2024 5:25 PM Dictation workstation:   [id number]    CT thoracic spine wo IV contrast    Result Date: 8/30/2024  Interpreted By:  Asa Esparza and Hooper Grayson STUDY: CT THORACIC SPINE WO IV CONTRAST;  8/30/2024 3:42 pm   INDICATION: Signs/Symptoms:assess for mets.     COMPARISON: None.   ACCESSION NUMBER(S): [id number]   ORDERING CLINICIAN: FERN LAMB   TECHNIQUE: Helical CT images of the thoracic spine are obtained after the IV administration of 100 mL Omnipaque 350 contrast agent. Axial, coronal and sagittal reconstructions are submitted for review.   FINDINGS: No acute or aggressive appearing osseous abnormalities. Normal alignment of the thoracic spine. Mild intervertebral disc space height loss is seen. No evidence of bony encroachment on the neural foramina or spinal canal.   Bone mineralization appears diffusely decreased.   Bilateral upper lung parenchymal soft tissue consolidation is seen with air bronchograms, suggesting previous surgery/radiation. Again observed is thickening of the demonstrated segment of the esophagus. Calcified atheromatous plaque is noted throughout the thoracic aorta. Paraspinal soft tissues are otherwise unremarkable.       No CT evidence of acute osseous abnormality or metastatic deposit in the thoracic spine.   I personally reviewed the images/study and I agree with the findings as stated. This study was interpreted at [facility], [place], [place].   MACRO: None   Signed by: Asa Esparza 8/30/2024 4:52 PM Dictation workstation:   [id number]    CT lumbar spine wo IV contrast    Result Date: 8/30/2024  Interpreted By:  Asa Esparza and Hooper Grayson STUDY: CT LUMBAR SPINE WO IV CONTRAST  8/30/2024 3:42 pm   INDICATION: Signs/Symptoms:assess for tumor     COMPARISON: 08/07/2024 lumbar MR   ACCESSION NUMBER(S): [id number]    ORDERING CLINICIAN: FERN LAMB   TECHNIQUE: Helical CT images of the lumbar spine are obtained after the IV administration of 100 mL Omnipaque 350 contrast agent. Axial, coronal and sagittal reconstructions are provided for review.   FINDINGS: Alignment:  There is a proximally 5 mm of retrolisthesis of L5 relative to L4. Slight loss of lumbar lordosis seen. No acute or aggressive appearing bony abnormalities.   Vertebrae/Disc Spaces:   The vertebral body heights are intact. Disc space height loss observed at L4-5 and L5-S1.   Lower Thoracic Spine:  There is no significant central canal stenosis in the included lower thoracic region.   T12-L1:  No evidence of osseous encroachment on the spinal canal or of the neural foramina.   L1-2:  No evidence of osseous encroachment on the spinal canal or of the neural foramina.   L2-3:  No evidence of osseous encroachment on the spinal canal or of the neural foramina.   L3-4:  No evidence of osseous encroachment on the spinal canal or of the neural foramina.   L4-5:  The L4-5 lateral recesses and spinal canal are severely stenosed secondary to disc bulge, ligament thickening and facet hypertrophy similar to the prior MR   L5-S1:  Posterior endplate osteophytosis observed, somewhat favoring the right. There is mild osseous encroachment on the right neural foramen normal appearance of the left neural foramen. No evidence of osseous encroachment on the spinal canal.   Prevertebral/Paraspinal Soft Tissues: Calcified atheromatous plaque is noted throughout the abdominal aorta and its branch vasculature. Nonobstructive right nephrolithiasis seen.       Degenerative changes as noted above most severe at L4-5 similar to the 08/07/2024 MR. Small nodules noted in the thecal sac on the prior MR not identified on the current exam due to differences in imaging technique.   I personally reviewed the images/study and I agree with the findings as stated. This study was interpreted at  [place], [place].   MACRO: None   Signed by: Asa Esparza 8/30/2024 4:49 PM Dictation workstation:   [id number]    CT cervical spine wo IV contrast    Result Date: 8/30/2024  Interpreted By:  Asa Esparza and Hooper Grayson STUDY: CT CERVICAL SPINE WO IV CONTRAST;  8/30/2024 3:37 pm   INDICATION: Signs/Symptoms:assess for metastatic tumor.     COMPARISON: CT angiography of the neck obtained November 23, 2022   ACCESSION NUMBER(S): [id number]   ORDERING CLINICIAN: FERN LAMB   TECHNIQUE: Helical CT images of the cervical spine are obtained. Axial, coronal and sagittal reconstructions are provided for review.   FINDINGS: Fractures: There is no evidence for an acute fracture of the cervical spine.   Vertebral Alignment: Sigmoid configuration of the cervical spine is observed. There is 4 mm of C3 on C4 anterolisthesis.   Craniocervical Junction: The odontoid process and craniocervical junction are intact.   No severe stenoses of the cervical spinal canal are noted. The left neural foramen is severely stenosed secondary to uncovertebral spurring and facet hypertrophy. The left facet joint is severely arthritic. The right C5-6 neural foramen and lateral recess are mildly to moderately stenosed by uncovertebral spurring as well.   Vertebrae/Disc Spaces:  Diffuse cervical spine disc space height loss is observed. Degenerative ankylosis of the C4 and C5 vertebral bodies is seen. Posterior endplate osteophyte formation observed at C4-5 and C5-6. No acute or aggressive appearing osseous abnormalities.   Prevertebral/Paraspinal Soft Tissues: Bilateral upper lung scarring is observed with bronchiectasis and air bronchograms. There is leftward deviation of the trachea. Circumferential soft tissue thickening of the esophagus is observed (axial series 301, image 426).   There is a 1.5 cm low attenuating nodule observed within the left thyroid lobe. This lesion has grown  significantly since the comparison CT.       1. No CT evidence of acute or aggressive appearing osseous abnormality of the cervical spine. Chronic and degenerative findings are detailed above. 2. Suspect scarring and atelectasis of the remnant lung parenchymal tissue. Recurrent neoplasm is not excluded based on noncontrast imaging alone. 3. Circumferential thickening of the esophagus may be secondary to adjacent granulation tissue. Findings should be correlated with dysphagia. 4. Interval growth of the left thyroid lobe nodule.   I personally reviewed the images/study and I agree with the findings as stated. This study was interpreted at [place], [place].   MACRO: Incidental Finding:  There are few small hypoattenuating nodules measuring equal to or greater than 1.5 cm in the thyroid gland. (**-YCF-**)   Instructions:  Further evaluation with nonemergent thyroid ultrasound. (Managing Incidental Thyroid Nodules Detected on Imaging: White Paper of the ACR Incidental Thyroid Findings Committee. Catherine Espana. et al. Journal of the American College of Radiology,Volume 12, Issue 2, 143 - 150.) [email or web address]   Signed by: Asa Esparza 8/30/2024 4:32 PM Dictation workstation:   [id number]    CT head wo IV contrast    Result Date: 8/30/2024  Interpreted By:  Asa Esparza and Hooper Grayson STUDY: [id number] CT HEAD WO IV CONTRAST   INDICATION: Signs/Symptoms:new spine tumor, R leg weakness, assess for brain tumor   COMPARISON: CT of the head obtained May 23, 2023   ACCESSION NUMBER(S): [id number]   ORDERING CLINICIAN: FERN LAMB   TECHNIQUE: Noncontrast helical CT of the head was performed. Multiplanar reformations in bone and soft tissue algorithm were provided.   FINDINGS: Parenchymal hypodensity observed in the bilateral basal ganglia consistent with remote lacunar infarcts.   There is no loss of gray-white differentiation.   No evidence of acute intracranial  hemorrhage.   No intracranial mass or mass effect.   No midline shift or herniation.   No ventriculomegaly. Normal appearance of the basilar cisterns.   Normal CT appearances of the paranasal sinuses. Status post right lens replacement. No mastoid effusion. No acute or aggressive appearing calvarial lesion.       No CT evidence of acute intracranial infarct, mass, or mass effect. Bilateral remote lacunar infarcts are noted.   I personally reviewed the images/study and I agree with the findings as stated. This study was interpreted at [facility], [place], [place].   Signed by: Asa Esparza 8/30/2024 4:24 PM Dictation workstation:   [id number]       Assessment/Plan   Assessment & Plan  Spinal cord lesion (Multi)    Edu Domingo is a 73 y.o. female here with with adenocarcinoma of the lung s/p DIXIE lobectomy, breast lump, ischemic cardiomyopathy (resolved), CAD with RCA PCI, nonsustained VT, TIA, COPD, HTN, depression, anxiety, HLD here with back pain and spinal cord lesion.    Neuro exam reassuring, with 5/5 bilateral strength following walking around ED. Anal sphincter tone appropriate, low suspicion for cord compression. CT scan largely unremarkable for metastatic disease, lab work so far non-concerning. Unsure if etiology of back/leg pain is MSK over neurologic. Will continue workup to assess for malignancy, lower suspicion for infectious or rheumatologic process at this time.     #Spinal cord lesion  #low back pain  -low suspicion for ongoing cord compression, no steroids at this time  -neurosurgery consulted, will plan to obtain further imaging  -HIV, syphillis, copper, Hep C RNA, lyme, B12, A1c, TSH reflex  -no LP at this time  -XR lumbar spine w flexion, extension  -tylenol 975 mg q6h prn    #CAD  #ischemic cardiomyopathy, resolved  #HTN  #HLD  -aspirin 81 mg daily  -amlodipine 5 mg daily  -lipitor 80 mg nightly  -lasix 20 mg daily  -lisinopril 20 mg daily    #COPD  -spiriva 2  puffs daily  -albuterol nebs prn    #depression  #anxiety   -xanax 0.5 mg nightly prn  -reportedly on SNRI and SSRI  -holding home celexa   -venlafaxine XR 37.5 mg daily    Diet: regular  GI ppx: not indicated  DVT ppx: subcutaneous heparin  Bowel reg: miralax    Code Status: Full Code  NOK: Joseluis Domingo (spouse) [phone]    Casey Edge MD  PGY-1, Internal Medicine-Pediatrics

## 2024-08-30 NOTE — CONSULTS
Consults  Date of Service:  8/30/2024 Attending Provider:  Samuel Woods MD     Reason for Consultation:  Edu Domingo is being seen today for a consult requested by Samuel Woods MD for new lumbar spine lesions.    Subjective   History of Present Illness:  Edu is a 73 y.o. female with No Principal Problem: There is no principal problem currently on the Problem List. Please update the Problem List and refresh.    Patient reported that she has had back pain for months, described it as long back pain worse when walking, relieved by sitting of bending over. Said was previously thought to be related to her renal disease but not resolved after lithotripsy, primary care physician ordered an MRI L spine. Noted to find possible lesions, therefore followed up with contrasted scan and was sent to the ED.     Patient denied any weakness, numbness, vision changes, fevers or chills. Did endorse RUE hand numbness and tingling worse in her first three digits. Denied any radicular pain but did report difficulty with find motor movements, has stopped wearing button up shirts because they were difficult to button. Otherwise no falls or unsteadiness.     Review of Systems negative other than listed in HPI.    Objective   Vitals:  Vitals:    08/30/24 1116   BP: 133/72   Pulse: 68   Resp: 16   Temp: 35.6 °C (96 °F)   SpO2: 99%         Exam:  Constitutional: No acute distress  Resp: breathing comfortably  Cardio: well perfused  GI: nondistended  MSK: full range of motion  Neuro: Awake, Ox3  face symmetric  RUE 5/5  LUE 5/5  RLE 5/5  LLE 5/5  RUE fingertip tingling worst in the first three digits  No hoffmans  No clonus  Psych: appropriate  Skin: no obvious lesions    Medical History  Past Medical History:   Diagnosis Date    Anxiety     Anxiety disorder, unspecified     Anxiety    Calculus of kidney     Bilateral kidney stones    Cataracts, bilateral     Chronic kidney disease     stage 3    COPD (chronic obstructive pulmonary  disease) (Multi)     Coronary artery disease     Hypertension     Lung cancer (Multi)     Major depressive disorder, recurrent, in partial remission (CMS-HCC) 09/21/2021    Recurrent major depressive disorder, in partial remission    Nephrolithiasis     Old myocardial infarction     History of myocardial infarction    Other conditions influencing health status     Coronary Artery Disease    Other transient cerebral ischemic attacks and related syndromes     Subclavian steal syndrome    Personal history of other diseases of the circulatory system     History of hypertension    Personal history of other malignant neoplasm of bronchus and lung     Personal history of malignant neoplasm of lung    Personal history of other specified conditions 01/10/2019    History of abdominal pain    Skin cancer     squamous cell carcinoma       Surgical History  Past Surgical History:   Procedure Laterality Date    CT ANGIO NECK  11/26/2021    CT NECK ANGIO W AND WO IV CONTRAST 11/26/2021 [facility]ICAL [facility]GAC[facility]    CT ANGIO NECK  11/23/2022    CT NECK ANGIO W AND WO IV CONTRAST 11/23/2022 [facility]CILL[facility] LEGACY    CT HEAD ANGIO W AND WO IV CONTRAST  11/26/2021    CT HEAD ANGIO W AND WO IV CONTRAST 11/26/2021 [facility] CLINICAL LEGACY    LUNG LOBECTOMY  04/22/2014    Lung Lobectomy    OTHER SURGICAL HISTORY  08/20/2013    Cath Stent 1 Type Drug-Eluting    OTHER SURGICAL HISTORY  08/20/2013    Cardiac Cath Procedure Outcome: Successful    OTHER SURGICAL HISTORY  04/22/2014    Destruction Of Malignant Lesion        Medications  Current Outpatient Medications   Medication Instructions    acetaminophen (TYLENOL ORAL) 1 tablet, oral, Daily PRN    albuterol 90 mcg/actuation inhaler INHALE 2 PUFFS BY MOUTH AS INSTRUCTED EVERY 4 HOURS AS NEEDED FOR WHEEZING / SHORTNESS OF BREATH    ALPRAZolam (XANAX) 0.5 mg, oral, 3 times daily PRN    amLODIPine (NORVASC) 5 mg, oral, Daily    ammonium lactate (Lac-Hydrin) 12 % lotion Topical, As needed    Anoro Ellipta  62.5-25 mcg/actuation blister with device INHALE 1 PUFF BY MOUTH AS INSTRUCTED ONCE DAILY.    aspirin 81 mg EC tablet 1 tablet, oral, Daily    atorvastatin (LIPITOR) 80 mg, oral, Daily    carvedilol (COREG) 25 mg, oral, 2 times daily (morning and late afternoon)    celecoxib (CELEBREX) 200 mg, oral, Daily PRN    cetirizine HCl (ZYRTEC ORAL) 1 capsule, oral, Daily PRN    cholecalciferol (Vitamin D-3) 50 MCG (2000 UT) tablet oral    citalopram (CELEXA) 40 mg, oral, Daily    diclofenac (CATAFLAM) 50 mg, oral, 3 times daily    furosemide (LASIX) 20 mg, oral, Daily    Gemtesa 75 mg, oral, Daily    hydrocortisone 2.5 % cream Topical, 2 times daily PRN    lisinopril 20 mg, oral, Daily    magnesium oxide (MAG-OX) 400 mg, oral, Daily    nitroglycerin (NITROSTAT) 0.4 mg, sublingual, Every 5 min PRN, Times three PRN    oxygen (O2) gas therapy at bedtime    venlafaxine XR (EFFEXOR-XR) 37.5 mg, oral, Daily, Do not crush or chew.        Diagnostic Results:    Lab Results   Component Value Date    WBC 8.8 06/25/2024    HGB 13.0 06/25/2024    HCT 40.4 06/25/2024    MCV 92 06/25/2024     06/25/2024     Lab Results   Component Value Date    CREATININE 1.21 (H) 06/25/2024    BUN 21 06/25/2024     06/25/2024    K 4.5 06/25/2024     06/25/2024    CO2 28 06/25/2024     Lab Results   Component Value Date    INR 1.0 05/23/2023    INR 1.0 11/26/2022    INR 1.1 11/27/2021    PROTIME 11.5 05/23/2023    PROTIME 12.1 11/26/2022    PROTIME 12.8 11/27/2021       === 04/05/24 ===    CT ABDOMEN PELVIS W IV CONTRAST    - Impression -  1.  Suspected calculi in the distal right ureter measuring 1-2 mm,  with vascular phleboliths considered less likely. Right ureter is  non-dilated. Mild dilatation of the right renal pelvis.  2. Four additional non-obstructing calculi in peripheral calices of  the right kidney measuring 3 mm or smaller.  3. Apparent wall thickening of the gastric fundus is strongly favored  to be attributable to  decompressed state. However a mass lesion is  not entirely excluded. Correlation with any symptoms to suggest  gastric malignancy may be helpful. If there is persistent clinical  concern, a CT could be obtained shortly after administration of oral  contrast, versus endoscopy.  4. A few additional chronic/incidental findings are similar to prior  study as detailed above including severe atherosclerotic  calcifications of the abdominal aorta, small sliding hiatal hernia,  trace left pleural effusion, and small pericardial effusion.    MACRO:  None    Signed by: Ritesh Rascon 4/6/2024 11:01 AM  Dictation workstation:   [id number]  === 08/23/24 ===    MR LUMBAR SPINE W AND WO CONTRAST    - Impression -  Rounded masses within the cauda equina at L4 and L5, which  demonstrate likely contrast enhancement have increased in size or  prominence since the recent comparison examination. Of note, these  lesions are not well visualized on axial imaging. Differential  diagnostic considerations would include neoplastic or inflammatory  disease more likely than schwannoma or meningioma given apparent rate  of change. Please correlate clinically and given atypical appearance  and difficulty visualizing findings in the axial view if further  imaging clarification were clinically necessary consider imaging of  the remainder of the neuraxis and/or dedicated 3D high-resolution  lumbar spine MRI with contrast. CSF sampling may also be of utility  if clinically indicated.    I personally reviewed the images/study and I agree with the findings  as stated. This study was interpreted at [facility], [place], [place].    MACRO:  None    Signed by: Octavio Camp 8/26/2024 4:42 PM  Dictation workstation:   [id number]  mJOA:  - UE Motor: 3 - Able to button shirt with great difficulty  - LE Motor: 7 - No dysfunction  - Sensation: 2 - Mild sensory loss of hands  - Sphincter: 3 - Normal micturition  - TOTAL:  15    Assessment/Plan   Assessment:  73yF h/o lung adenocarcinomoa s/p lobectomy (2014), actinic keratoses of LLE s/p cryotherapy, HTN, HLD, CAD s/p RCA PCI (on ASA), COPD, smoker, prior TIA, depression, anxiety, R hydronephrosis s/p laser lithotripsy (7/5/2024), 8/23 p/w back pain as OP, MRI L spine two enhancing lesions along cauda equina at L4 and L5, poss schwawnnoma increased compared to MRI on 8/7, L4-5 stenosis w/ grade 1 spondy    Plan:    Although lesions on imaging more consistent with possible schwannoma, patient will need MRI brain, C and T spine with and without contrast to rule out other lesions, especially in the setting of concern for metastatic disease and myelopathic symptoms  Recommend CT CAP with and without contrast for metastatic work up  Back pain and imaging with spondylosis also consistent with possible lumbar claudication, can obtain xrays of the L spine with flexion and extension films for further work up    Danny Burns MD

## 2024-08-30 NOTE — SIGNIFICANT EVENT
Senior Staffing Note    73 y.o. female with PMHx of adenocarcinoma of the lung s/p DIXIE lobectomy (2013), breast lump, ischemic cardiomyopathy, COPD (by PFTs), HTN, depression, anxiety, and HLD presenting at the request of PCP following MRI findings of cauda equina nodules at L4-5. This is in the setting of several months of stable low back pain and R > L leg weakness, though the later are better described as fatigable weakness secondary to pain, along with weight loss of roughly 15 pounds, in the setting of active dietary modification. No concern for cord compression on imaging or exam - appropriate anal tone and wink, and no localizing weakness or decreased sensation on exam. CT C/A/P largely unremarkable for possible metastatic disease, and labs quite stable. Patient's symptoms may moreso be in the setting of an MSK as opposed to a neurological pathology. That the lesions are nodular and within the cauda itself is more suggestive of a possible schwannoma or meningioma - there does not appear to be evidence of localized inflammation to be more concerning for an infectious or rheumatological process (and patient's labs and exam are slightly less c/f these items). NSGY involved, further imaging ordered - stable for admission to the floor, with no acute indication for steroids or radiation. Will expedite workup as below.    #Cauda equina nodules at L4-5  - LFTs added on, will assess for protein gap, be able to better assess calcium  - A1c  - TSH and FT4  - HIV, syphilis, hep c, Lyme screening  - B12 and copper  - Consider TB testing   - Defer LP, no strong clinical indication   - C and T spine MR  - MR brain  - Follow NSGY recommendations    #History of lung cancer  #Active smoker  #DIXIE GGO  #DAMIEN 3 bx  #Breast lump?  CT C/A/P without gross c/f mets.  - Consider possible oncology consult vs OP FUV    #LBP  - XR L spine with flexion and extension    #Depression  On both an SNRI and SSRI at home.   - Hold SSRI    Regine  MD Ameya  Internal Medicine and Pediatrics, PGY-2  [place]

## 2024-08-30 NOTE — ED TRIAGE NOTES
Pt presented to ED for c/o rounded masses within the cauda equina at L4 and L5, which  demonstrate likely contrast enhancement have increased in size or  prominence since the recent comparison examination per MRI done on 8/9/24.    Pt having severe pain and issues ambulating, numbness as well. Pt is fully ambulatory in triage without an assistant device.

## 2024-08-30 NOTE — ED PROVIDER NOTES
Emergency Department Provider Note        History of Present Illness     History provided by: Patient  Limitations to History: None  External Records Reviewed with Brief Summary: Outpatient progress note from Primary care which showed That she has been getting a workup done for low back pain, she had an MRI with contrast of the lumbar spine that showed spinal cord masses.    HPI:  Edu Domingo is a 73 y.o. female With a past medical history of adenocarcinoma of the lung, breast lump, hypertension who presents with chronic back pain.  She states that she had a recent kidney stone that needed ureteral stenting.  She states that she had continued to have back pain, so her PCP ordered an MRI.  They found a mass in the lumbar spine.  Repeat MRI with contrast was done which is concerning for masses.  She states that she feels like she is a little weaker in the right hip.  The patient's had a history of adenocarcinoma of the lung which was treated back in 2013 with lobectomy as well as chemo but has not been on any treatments recently.  She also states that she has a left breast lump that has not been fully worked up yet and mammography has not been performed.    Patient denies any chest pain, shortness of breath, vomiting, fevers, chills. Patient denies saddle anesthesias, urinary or bowel incontinence, urinary retention.    Physical Exam   Triage vitals:  T 35.6 °C (96 °F)  HR 68  /72  RR 16  O2 99 % None (Room air)    General: awake, alert, oriented x 3, no acute distress  Head: normocephalic, atraumatic  Eyes: PERRL, EOMI, no scleral icterus  Mouth: mucous membranes moist, no oral lesions identified  Neck: supple, ROM intact, trachea is midline  CV: RRR, normal S1/S2, no murmurs/rubs/gallops  Resp: equal rise bilaterally, normal respiratory effort, CTAB, no wheezing, rhonchi, or rales.  ABD: soft, nontender, nondistended, no guarding or rigidity, normal bowel sounds, no peritoneal signs  Neuro: No focal  deficits. Cranial nerves 2-12 intact. Strength 5/5 in upper and lower extremities bilaterally. Sensation intact to light touch throughout.   MSK: calves soft and nontender. No cyanosis, clubbing, or edema. Moves all extremities with good tone. No palpable deformities. Neurovascularly intact throughout.   Skin: warm, dry, and intact  Psych: Appropriate mood and affect      Medical Decision Making & ED Course   Medical Decision Makin y.o. female Who was sent in by her PCP for concerns of her rounded mass in the L4-L5 area of her spine.  The patient's not having any red flag symptoms of cauda equina syndrome, such as urinary retention, urinary incontinence, or saddle anesthesia.  There is no focal neurologic deficits at this time.  The patient had normal pulses in the lower and upper extremities bilaterally.  Based on herRecent scans there is low concern for abdominal aortic aneurysm.  Patient was given some tramadol for pain.  Neurosurgery was consulted and recommended CT of the head and neck chest and C/L-spine as well as MRIs with and without contrast and the results are pending.  She had a CBC which was unremarkable.  She did have a BMP that showed an elevated creatinine but is at her baseline.  She did not have an elevated CRP or ESR which decreases the suspicion for inflammatory process which was part of the differential on the MRI. There is concerned that this is a metastasis from either primary cancer of the lung and/or breast cancer.    ----      Differential diagnoses considered include but are not limited to: Metastasis to the lumbar spine.     Social Determinants of Health which Significantly Impact Care: None identified     EKG Independent Interpretation: EKG not obtained    Independent Result Review and Interpretation: Relevant laboratory and radiographic results were reviewed and independently interpreted by myself.  As necessary, they are commented on in the ED Course.    Chronic conditions  affecting the patient's care: As documented above in MDM    The patient was discussed with the following consultants/services: Neurosurgery regarding Potential metastasis to the lumbar spine.    Care Considerations: As documented above in MDM    ED Course:  Diagnoses as of 08/30/24 1353   Spinal cord lesion (Multi)     Disposition   Patient was signed out to Billy Isbell pending completion of their work-up.  Please see the next provider's transition of care note for the remainder of the patient's care.     Procedures       Patient seen and discussed with ED attending physician.    AUDIE DORSEY  Emergency Medicine       Audie Dorsey  08/30/24 7757

## 2024-08-31 ENCOUNTER — APPOINTMENT (OUTPATIENT)
Dept: RADIOLOGY | Facility: HOSPITAL | Age: 74
DRG: 566 | End: 2024-08-31
Payer: MEDICARE

## 2024-08-31 VITALS
DIASTOLIC BLOOD PRESSURE: 63 MMHG | RESPIRATION RATE: 16 BRPM | TEMPERATURE: 97.2 F | OXYGEN SATURATION: 96 % | BODY MASS INDEX: 19.83 KG/M2 | WEIGHT: 119 LBS | HEIGHT: 65 IN | SYSTOLIC BLOOD PRESSURE: 143 MMHG | HEART RATE: 68 BPM

## 2024-08-31 LAB
ALBUMIN SERPL BCP-MCNC: 4 G/DL (ref 3.4–5)
ANION GAP SERPL CALC-SCNC: 13 MMOL/L (ref 10–20)
BUN SERPL-MCNC: 11 MG/DL (ref 6–23)
CALCIUM SERPL-MCNC: 9.4 MG/DL (ref 8.6–10.6)
CHLORIDE SERPL-SCNC: 103 MMOL/L (ref 98–107)
CO2 SERPL-SCNC: 28 MMOL/L (ref 21–32)
CREAT SERPL-MCNC: 0.82 MG/DL (ref 0.5–1.05)
EGFRCR SERPLBLD CKD-EPI 2021: 77 ML/MIN/1.73M*2
GLUCOSE SERPL-MCNC: 96 MG/DL (ref 74–99)
MAGNESIUM SERPL-MCNC: 2.02 MG/DL (ref 1.6–2.4)
PHOSPHATE SERPL-MCNC: 3 MG/DL (ref 2.5–4.9)
POTASSIUM SERPL-SCNC: 3.7 MMOL/L (ref 3.5–5.3)
SODIUM SERPL-SCNC: 140 MMOL/L (ref 136–145)
TREPONEMA PALLIDUM IGG+IGM AB [PRESENCE] IN SERUM OR PLASMA BY IMMUNOASSAY: NONREACTIVE
VIT B12 SERPL-MCNC: 655 PG/ML (ref 211–911)

## 2024-08-31 PROCEDURE — 72141 MRI NECK SPINE W/O DYE: CPT | Mod: REDUCED SERVICES | Performed by: RADIOLOGY

## 2024-08-31 PROCEDURE — 82607 VITAMIN B-12: CPT | Performed by: INTERNAL MEDICINE

## 2024-08-31 PROCEDURE — 72146 MRI CHEST SPINE W/O DYE: CPT | Mod: REDUCED SERVICES | Performed by: RADIOLOGY

## 2024-08-31 PROCEDURE — 87522 HEPATITIS C REVRS TRNSCRPJ: CPT

## 2024-08-31 PROCEDURE — 2500000001 HC RX 250 WO HCPCS SELF ADMINISTERED DRUGS (ALT 637 FOR MEDICARE OP)

## 2024-08-31 PROCEDURE — 72146 MRI CHEST SPINE W/O DYE: CPT | Mod: 52

## 2024-08-31 PROCEDURE — 2500000004 HC RX 250 GENERAL PHARMACY W/ HCPCS (ALT 636 FOR OP/ED)

## 2024-08-31 PROCEDURE — 82525 ASSAY OF COPPER: CPT

## 2024-08-31 PROCEDURE — 99222 1ST HOSP IP/OBS MODERATE 55: CPT

## 2024-08-31 PROCEDURE — 70551 MRI BRAIN STEM W/O DYE: CPT | Mod: 52

## 2024-08-31 PROCEDURE — 80069 RENAL FUNCTION PANEL: CPT

## 2024-08-31 PROCEDURE — 94640 AIRWAY INHALATION TREATMENT: CPT

## 2024-08-31 PROCEDURE — 86618 LYME DISEASE ANTIBODY: CPT

## 2024-08-31 PROCEDURE — 72141 MRI NECK SPINE W/O DYE: CPT | Mod: 52

## 2024-08-31 PROCEDURE — 72110 X-RAY EXAM L-2 SPINE 4/>VWS: CPT | Performed by: STUDENT IN AN ORGANIZED HEALTH CARE EDUCATION/TRAINING PROGRAM

## 2024-08-31 PROCEDURE — 2500000002 HC RX 250 W HCPCS SELF ADMINISTERED DRUGS (ALT 637 FOR MEDICARE OP, ALT 636 FOR OP/ED)

## 2024-08-31 PROCEDURE — 72120 X-RAY BEND ONLY L-S SPINE: CPT

## 2024-08-31 PROCEDURE — 99236 HOSP IP/OBS SAME DATE HI 85: CPT

## 2024-08-31 PROCEDURE — 83735 ASSAY OF MAGNESIUM: CPT

## 2024-08-31 PROCEDURE — 36415 COLL VENOUS BLD VENIPUNCTURE: CPT

## 2024-08-31 PROCEDURE — 86780 TREPONEMA PALLIDUM: CPT

## 2024-08-31 RX ORDER — TRAMADOL HYDROCHLORIDE 50 MG/1
25 TABLET ORAL ONCE
Status: COMPLETED | OUTPATIENT
Start: 2024-08-31 | End: 2024-08-31

## 2024-08-31 RX ORDER — ONDANSETRON HYDROCHLORIDE 2 MG/ML
4 INJECTION, SOLUTION INTRAVENOUS ONCE
Status: COMPLETED | OUTPATIENT
Start: 2024-08-31 | End: 2024-08-31

## 2024-08-31 RX ORDER — TRAMADOL HYDROCHLORIDE 50 MG/1
25 TABLET ORAL
Status: COMPLETED | OUTPATIENT
Start: 2024-08-31 | End: 2024-08-31

## 2024-08-31 RX ORDER — OXYCODONE HYDROCHLORIDE 5 MG/1
5 TABLET ORAL
Status: DISCONTINUED | OUTPATIENT
Start: 2024-08-31 | End: 2024-08-31

## 2024-08-31 SDOH — SOCIAL STABILITY: SOCIAL INSECURITY: DO YOU FEEL UNSAFE GOING BACK TO THE PLACE WHERE YOU ARE LIVING?: NO

## 2024-08-31 SDOH — SOCIAL STABILITY: SOCIAL INSECURITY: HAVE YOU HAD ANY THOUGHTS OF HARMING ANYONE ELSE?: NO

## 2024-08-31 SDOH — SOCIAL STABILITY: SOCIAL INSECURITY: HAS ANYONE EVER THREATENED TO HURT YOUR FAMILY OR YOUR PETS?: NO

## 2024-08-31 SDOH — SOCIAL STABILITY: SOCIAL INSECURITY: ARE YOU OR HAVE YOU BEEN THREATENED OR ABUSED PHYSICALLY, EMOTIONALLY, OR SEXUALLY BY ANYONE?: NO

## 2024-08-31 SDOH — SOCIAL STABILITY: SOCIAL INSECURITY: WERE YOU ABLE TO COMPLETE ALL THE BEHAVIORAL HEALTH SCREENINGS?: YES

## 2024-08-31 SDOH — SOCIAL STABILITY: SOCIAL INSECURITY: ARE THERE ANY APPARENT SIGNS OF INJURIES/BEHAVIORS THAT COULD BE RELATED TO ABUSE/NEGLECT?: NO

## 2024-08-31 SDOH — SOCIAL STABILITY: SOCIAL INSECURITY: DOES ANYONE TRY TO KEEP YOU FROM HAVING/CONTACTING OTHER FRIENDS OR DOING THINGS OUTSIDE YOUR HOME?: NO

## 2024-08-31 SDOH — SOCIAL STABILITY: SOCIAL INSECURITY: DO YOU FEEL ANYONE HAS EXPLOITED OR TAKEN ADVANTAGE OF YOU FINANCIALLY OR OF YOUR PERSONAL PROPERTY?: NO

## 2024-08-31 SDOH — SOCIAL STABILITY: SOCIAL INSECURITY: ABUSE: ADULT

## 2024-08-31 SDOH — SOCIAL STABILITY: SOCIAL INSECURITY: HAVE YOU HAD THOUGHTS OF HARMING ANYONE ELSE?: NO

## 2024-08-31 ASSESSMENT — PAIN SCALES - GENERAL
PAINLEVEL_OUTOF10: 8
PAINLEVEL_OUTOF10: 1
PAINLEVEL_OUTOF10: 3

## 2024-08-31 ASSESSMENT — LIFESTYLE VARIABLES
SUBSTANCE_ABUSE_PAST_12_MONTHS: NO
AUDIT-C TOTAL SCORE: 0
HOW OFTEN DO YOU HAVE 6 OR MORE DRINKS ON ONE OCCASION: NEVER
AUDIT-C TOTAL SCORE: 0
SKIP TO QUESTIONS 9-10: 1
HOW OFTEN DO YOU HAVE A DRINK CONTAINING ALCOHOL: NEVER
HOW MANY STANDARD DRINKS CONTAINING ALCOHOL DO YOU HAVE ON A TYPICAL DAY: PATIENT DOES NOT DRINK
PRESCIPTION_ABUSE_PAST_12_MONTHS: NO

## 2024-08-31 ASSESSMENT — PAIN DESCRIPTION - LOCATION: LOCATION: BACK

## 2024-08-31 ASSESSMENT — ACTIVITIES OF DAILY LIVING (ADL)
TOILETING: INDEPENDENT
WALKS IN HOME: INDEPENDENT
LACK_OF_TRANSPORTATION: NO
HEARING - LEFT EAR: FUNCTIONAL
ADEQUATE_TO_COMPLETE_ADL: YES
LACK_OF_TRANSPORTATION: NO
PATIENT'S MEMORY ADEQUATE TO SAFELY COMPLETE DAILY ACTIVITIES?: YES
HEARING - RIGHT EAR: FUNCTIONAL
JUDGMENT_ADEQUATE_SAFELY_COMPLETE_DAILY_ACTIVITIES: YES
GROOMING: INDEPENDENT
BATHING: INDEPENDENT
DRESSING YOURSELF: INDEPENDENT
FEEDING YOURSELF: INDEPENDENT

## 2024-08-31 ASSESSMENT — PATIENT HEALTH QUESTIONNAIRE - PHQ9
2. FEELING DOWN, DEPRESSED OR HOPELESS: NOT AT ALL
SUM OF ALL RESPONSES TO PHQ9 QUESTIONS 1 & 2: 0
1. LITTLE INTEREST OR PLEASURE IN DOING THINGS: NOT AT ALL

## 2024-08-31 ASSESSMENT — COGNITIVE AND FUNCTIONAL STATUS - GENERAL
MOBILITY SCORE: 24
PATIENT BASELINE BEDBOUND: NO
MOBILITY SCORE: 24
DAILY ACTIVITIY SCORE: 24
DAILY ACTIVITIY SCORE: 24

## 2024-08-31 ASSESSMENT — PAIN - FUNCTIONAL ASSESSMENT: PAIN_FUNCTIONAL_ASSESSMENT: 0-10

## 2024-08-31 NOTE — PROGRESS NOTES
08/31/24 1352   Discharge Planning   Living Arrangements Spouse/significant other   Support Systems Spouse/significant other   Assistance Needed Independent for adls   Type of Residence Private residence   Number of Stairs to Enter Residence 3   Number of Stairs Within Residence 12   Home or Post Acute Services None   Expected Discharge Disposition Home   Does the patient need discharge transport arranged? No   Financial Resource Strain   How hard is it for you to pay for the very basics like food, housing, medical care, and heating? Not hard   Housing Stability   In the last 12 months, was there a time when you were not able to pay the mortgage or rent on time? N   At any time in the past 12 months, were you homeless or living in a shelter (including now)? N   Transportation Needs   In the past 12 months, has lack of transportation kept you from medical appointments or from getting medications? no   In the past 12 months, has lack of transportation kept you from meetings, work, or from getting things needed for daily living? No     Transitional Care Coordination Progress Note:  Patient discussed during interdisciplinary rounds.   Team members present: BRADFORD ROD  Plan per Medical/Surgical team: Spinal Cord Lesion  Payor: [facility]  Discharge disposition: Home  Potential Barriers: none  ADOD: 1-2 days    Previous Home Care: NA  DME: Oxygen concentrator and portable tank, pt unsure who supplies  Pharmacy: [facility] [address] [place]  Falls: Denies  PCP:  Abram Salmeron; last visit 1-2 weeks ago  Met with patient at bedside, provided introduction of self and role. Patient states she lives at home with . Independent for adls; safe at home. Patient states she normally drives self to appointments. Patient state she wears oxygen at home prn at night, she is unsure of name of oxygen company that supplies. Patient states no concerns obtaining medications; states no social/financial concerns. Patient states  to  provide transport home at time of discharge.         Yessenia PEREYRAN, RN  Transitional Care Coordinator (TCC)  [phone]

## 2024-08-31 NOTE — CARE PLAN
Problem: Pain - Adult  Goal: Verbalizes/displays adequate comfort level or baseline comfort level  Outcome: Progressing     Problem: Safety - Adult  Goal: Free from fall injury  Outcome: Progressing   The patient's goals for the shift include      The clinical goals for the shift include Decrease in pain

## 2024-08-31 NOTE — HOSPITAL COURSE
"SHAD Domingo is a 73 y.o. female presenting with adenocarcinoma of the lung s/p DIXIE lobectomy, breast lump, ischemic cardiomyopathy (resolved), CAD with RCA PCI, nonsustained VT, TIA, COPD, HTN depression, anxiety, HLD here with back pain.     Patient first noticed back/R flank pain a few months ago. Hx of kidney stones, was referred to urology, underwent R ureteroscopy and lithotripsy on 7/5/2024. Flank pain improved post-procedure but back pain persisted. Was taking tylenol with some relief, additionally helped by tramadol from PCP. Pain is worse with flexion, sitting without support.      Had MRI Lspine w/o contrast 8/8 showing degenerative changes with spurs and disc bulging. Then MRI Lspine with contrast 8/23/24, showing rounded masses in L4 and L5 in the cauda equina. Around this time patient reports that she had issues walking, R leg worse than L, \"feels like they're going to give out\". Having more bowel urgency, no episodes of incontinence. Also noted occasional numbness, tingling in R hand. Her PCP wanted her to see neurology, but couldn't get appointment for three months. Decision was made then to come to ED for expedited workup.     ED/Hospital Course (8/30-8/31)  Neuro exam reassuring, with 5/5 bilateral strength following walking around ED. Anal sphincter tone appropriate, low suspicion for cord compression. CT scan largely unremarkable for metastatic disease, lab work non-concerning. Patient received MRI head, C/T spine overnight, though procedure was cut short before giving IV contrast due to inability to tolerate MRI from discomfort. Neurosurgery examined the available images which showed no acute concerns requiring intervention, spinal cord lesions likely schwannomas Obtained lumbar xrays, patient was discharged home on 8/31 with plans for close neurosurgery follow up.      "

## 2024-08-31 NOTE — DISCHARGE SUMMARY
Discharge Diagnosis  Spinal cord lesion (Multi)    Issues Requiring Follow-Up  -fu with PCP to discuss discontinuing SSRI or SNRI, currently instructed to hold celexa  -neurosurgery fu in 6 weeks    Discharge Meds     Your medication list        ASK your doctor about these medications        Instructions Last Dose Given Next Dose Due   albuterol 90 mcg/actuation inhaler      INHALE 2 PUFFS BY MOUTH AS INSTRUCTED EVERY 4 HOURS AS NEEDED FOR WHEEZING / SHORTNESS OF BREATH       ALPRAZolam 0.5 mg tablet  Commonly known as: Xanax      Take 1 tablet (0.5 mg) by mouth 3 times a day as needed for anxiety.       amLODIPine 5 mg tablet  Commonly known as: Norvasc      Take 1 tablet (5 mg) by mouth once daily.       ammonium lactate 12 % lotion  Commonly known as: Lac-Hydrin      Apply topically if needed for dry skin.       Anoro Ellipta 62.5-25 mcg/actuation blister with device  Generic drug: umeclidinium-vilanteroL      INHALE 1 PUFF BY MOUTH AS INSTRUCTED ONCE DAILY.       aspirin 81 mg EC tablet           atorvastatin 80 mg tablet  Commonly known as: Lipitor      Take 1 tablet (80 mg) by mouth once daily.       carvedilol 25 mg tablet  Commonly known as: Coreg      Take 1 tablet (25 mg) by mouth 2 times a day with meals.       celecoxib 200 mg capsule  Commonly known as: CeleBREX      Take 1 capsule (200 mg) by mouth once daily as needed for moderate pain (4 - 6).       cholecalciferol 50 MCG (2000 UT) tablet  Commonly known as: Vitamin D-3           citalopram 40 mg tablet  Commonly known as: CeleXA      Take 1 tablet (40 mg) by mouth once daily.       diclofenac 50 mg tablet  Commonly known as: Cataflam      Take 1 tablet (50 mg) by mouth 3 times a day for 10 days.       furosemide 20 mg tablet  Commonly known as: Lasix      Take 1 tablet (20 mg) by mouth once daily.       Gemtesa 75 mg tablet  Generic drug: vibegron      TAKE ONE TABLET BY MOUTH DAILY       hydrocortisone 2.5 % cream      Apply topically 2 times a day  "as needed for irritation or rash.       lisinopril 20 mg tablet      Take 1 tablet (20 mg) by mouth once daily.       magnesium oxide 400 mg tablet  Commonly known as: Mag-Ox      Take 1 tablet (400 mg) by mouth once daily.       nitroglycerin 0.4 mg SL tablet  Commonly known as: Nitrostat      Place 1 tablet (0.4 mg) under the tongue every 5 minutes if needed (angina). Times three PRN       oxygen gas therapy  Commonly known as: O2           TYLENOL ORAL           venlafaxine XR 37.5 mg 24 hr capsule  Commonly known as: Effexor-XR      Take 1 capsule (37.5 mg) by mouth once daily. Do not crush or chew.       ZYRTEC ORAL                    Test Results Pending At Discharge  Pending Labs       Order Current Status    Copper, serum In process    Hepatitis C RNA, Quantitative, PCR In process    LYME (B. BURGDORFERI) AB MODIFIED 2-TITER TESTING, WITH REFLEX TO IGM AND IGG BY LJ In process            Hospital Course  HPI  Edu Domingo is a 73 y.o. female presenting with adenocarcinoma of the lung s/p DIXIE lobectomy, breast lump, ischemic cardiomyopathy (resolved), CAD with RCA PCI, nonsustained VT, TIA, COPD, HTN depression, anxiety, HLD here with back pain.     Patient first noticed back/R flank pain a few months ago. Hx of kidney stones, was referred to urology, underwent R ureteroscopy and lithotripsy on 7/5/2024. Flank pain improved post-procedure but back pain persisted. Was taking tylenol with some relief, additionally helped by tramadol from PCP. Pain is worse with flexion, sitting without support.      Had MRI Lspine w/o contrast 8/8 showing degenerative changes with spurs and disc bulging. Then MRI Lspine with contrast 8/23/24, showing rounded masses in L4 and L5 in the cauda equina. Around this time patient reports that she had issues walking, R leg worse than L, \"feels like they're going to give out\". Having more bowel urgency, no episodes of incontinence. Also noted occasional numbness, tingling in R hand. " Her PCP wanted her to see neurology, but couldn't get appointment for three months. Decision was made then to come to ED for expedited workup.     ED/Hospital Course (8/30-8/31)  Neuro exam reassuring, with 5/5 bilateral strength following walking around ED. Anal sphincter tone appropriate, low suspicion for cord compression. CT scan largely unremarkable for metastatic disease, lab work non-concerning. Patient received MRI head, C/T spine overnight, though procedure was cut short before giving IV contrast due to inability to tolerate MRI from discomfort. Neurosurgery examined the available images which showed no acute concerns requiring intervention, spinal cord lesions likely schwannomas Obtained lumbar xrays, patient was discharged home on 8/31 with plans for close neurosurgery follow up.        Pertinent Physical Exam At Time of Discharge  Physical Exam  Constitutional:       General: She is not in acute distress.     Appearance: Normal appearance. She is normal weight.   Cardiovascular:      Rate and Rhythm: Normal rate and regular rhythm.      Pulses: Normal pulses.      Heart sounds: Normal heart sounds.   Pulmonary:      Effort: Pulmonary effort is normal.      Breath sounds: Normal breath sounds.   Abdominal:      General: Abdomen is flat. There is no distension.      Palpations: Abdomen is soft.   Musculoskeletal:         General: Tenderness (low back, stable) present. No swelling. Normal range of motion.      Right lower leg: No edema.      Left lower leg: No edema.   Skin:     General: Skin is warm and dry.      Capillary Refill: Capillary refill takes less than 2 seconds.   Neurological:      General: No focal deficit present.      Mental Status: She is alert and oriented to person, place, and time.       Outpatient Follow-Up  Future Appointments   Date Time Provider Department Center   9/6/2024  8:00 AM Abram Salmeron DO [id number] [place]   9/13/2024  1:00 PM Osmar Monroe MD [id number] [place]    10/21/2024  2:00 PM Fartun Randall MD [id number] [place]   12/6/2024  8:45 AM Aimee Ochoa MD [id number] [place]   1/9/2025 10:30 AM Christophe Wood MD [id number] [place]   6/3/2025 11:30 AM Zahra Harman MD [id number] [place]     Casey Edge MD  PGY-1, Internal Medicine-Pediatrics

## 2024-08-31 NOTE — CARE PLAN
Problem: Pain - Adult  Goal: Verbalizes/displays adequate comfort level or baseline comfort level  8/31/2024 1511 by Za Awad RN  Outcome: Adequate for Discharge  8/31/2024 1048 by Za Awad RN  Outcome: Progressing     Problem: Safety - Adult  Goal: Free from fall injury  8/31/2024 1511 by Za Awad RN  Outcome: Adequate for Discharge  8/31/2024 1048 by Za Awad RN  Outcome: Progressing     Problem: Discharge Planning  Goal: Discharge to home or other facility with appropriate resources  Outcome: Adequate for Discharge     Problem: Chronic Conditions and Co-morbidities  Goal: Patient's chronic conditions and co-morbidity symptoms are monitored and maintained or improved  Outcome: Adequate for Discharge   The patient's goals for the shift include      The clinical goals for the shift include Decrease in pain

## 2024-08-31 NOTE — DISCHARGE INSTRUCTIONS
You were admitted to the hospital following an MRI that showed concern for lesions on your spinal cord.   for additional imaging and lab studies. Those studies suggested that your spinal cord lesions were likely schwannomas, which are generally considered to be benign tumors from a part of your nerves. The neurosurgery team would like to follow-up with you in 6 weeks to discuss these findings and the timing of repeat imaging.    When we reviewed your medications, you mentioned you were taking both venlafaxine (effexor) and citalopram (celexa) both daily. These medicines are both anti-depressants from similar classes of medicines and generally should NOT be combined. There is a risk of a serious drug interaction when taking both of these medicines at the same time. You should talk with your primary care physician about these medicines and which one would be the best to continue and which would be the best to stop.    You should be seen by a doctor or come back to the hospital if you develop any of the following:  -new bowel or urinary incontinence  -weakness or numbness in your arms or legs that prevents you from moving around  -worsening pain that does not improve with medication  -increased confusion or sleepiness  -inability to tolerate any food or medicine by mouth

## 2024-08-31 NOTE — SIGNIFICANT EVENT
Imaging reviewed, patient with small lesions along L4 and L5 cauda equina likely schwannomas, will follow up as outpatient for possible growth with a 3 month MRI L spine with and without contrast. Patient also found to have significant cervical stenosis and an L4-5 spondy. Recommend pain management and physical therapy, patient can follow up in 6 weeks with Dr. Gomez, we will arrange. Please page [phone] with any questions.

## 2024-08-31 NOTE — PROGRESS NOTES
Pharmacy Medication History Review    Edu Domingo is a 73 y.o. female admitted for Spinal cord lesion (Multi). Pharmacy reviewed the patient's vjlpa-as-depyydaxi medications and allergies for accuracy.    The list below reflects the updated PTA list. Comments regarding how patient may be taking medications differently can be found in the Admit Orders Activity  Prior to Admission Medications   Prescriptions Last Dose Informant   ALPRAZolam (Xanax) 0.5 mg tablet 8/30/2024 Self   Sig: Take 1 tablet (0.5 mg) by mouth 3 times a day as needed for anxiety.   Anoro Ellipta 62.5-25 mcg/actuation blister with device Past Week at patient request refills Self   Sig: INHALE 1 PUFF BY MOUTH AS INSTRUCTED ONCE DAILY.   Gemtesa 75 mg tablet 8/30/2024 Self   Sig: TAKE ONE TABLET BY MOUTH DAILY   acetaminophen (TYLENOL ORAL) 8/30/2024 Self   Sig: Take 1 tablet by mouth once daily as needed.   albuterol 90 mcg/actuation inhaler Past Week Self   Sig: INHALE 2 PUFFS BY MOUTH AS INSTRUCTED EVERY 4 HOURS AS NEEDED FOR WHEEZING / SHORTNESS OF BREATH   amLODIPine (Norvasc) 5 mg tablet 8/30/2024 Self   Sig: Take 1 tablet (5 mg) by mouth once daily.   ammonium lactate (Lac-Hydrin) 12 % lotion Past Month Self   Sig: Apply topically if needed for dry skin.   aspirin 81 mg EC tablet 8/30/2024 Self   Sig: Take 1 tablet (81 mg) by mouth once daily.   atorvastatin (Lipitor) 80 mg tablet 8/30/2024 Self   Sig: Take 1 tablet (80 mg) by mouth once daily.   carvedilol (Coreg) 25 mg tablet 8/30/2024 Self   Sig: Take 1 tablet (25 mg) by mouth 2 times a day with meals.   celecoxib (CeleBREX) 200 mg capsule 8/30/2024 Self   Sig: Take 1 capsule (200 mg) by mouth once daily as needed for moderate pain (4 - 6).   cetirizine HCl (ZYRTEC ORAL) 8/30/2024 Self   Sig: Take 1 capsule by mouth once daily as needed.   cholecalciferol (Vitamin D-3) 50 MCG (2000 UT) tablet Unknown Self   Sig: Take by mouth.   citalopram (CeleXA) 40 mg tablet 8/30/2024 Self   Sig: Take  1 tablet (40 mg) by mouth once daily.   diclofenac (Cataflam) 50 mg tablet Not Taking    Sig: Take 1 tablet (50 mg) by mouth 3 times a day for 10 days.   Patient not taking: Reported on 8/31/2024   furosemide (Lasix) 20 mg tablet 8/30/2024 Self   Sig: Take 1 tablet (20 mg) by mouth once daily.   hydrocortisone 2.5 % cream Past Month Self   Sig: Apply topically 2 times a day as needed for irritation or rash.   lisinopril 20 mg tablet 8/30/2024 Self   Sig: Take 1 tablet (20 mg) by mouth once daily.   magnesium oxide (Mag-Ox) 400 mg tablet 8/30/2024 Self   Sig: Take 1 tablet (400 mg) by mouth once daily.   nitroglycerin (Nitrostat) 0.4 mg SL tablet Unknown Self   Sig: Place 1 tablet (0.4 mg) under the tongue every 5 minutes if needed (angina). Times three PRN   oxygen (O2) gas therapy Past Month Self   Sig: at bedtime   venlafaxine XR (Effexor-XR) 37.5 mg 24 hr capsule 8/30/2024 Self   Sig: Take 1 capsule (37.5 mg) by mouth once daily. Do not crush or chew.      Facility-Administered Medications: None        The list below reflects the updated allergy list. Please review each documented allergy for additional clarification and justification.  Allergies  Reviewed by Sarbjit Bello RN on 8/30/2024        Severity Reactions Comments    Morphine Not Specified Unknown agitation    Myrbetriq [mirabegron] Not Specified Other Elevated blood pressure.    Oxycodone-acetaminophen Not Specified Unknown agitation    Sulfa (sulfonamide Antibiotics) Not Specified Unknown             Patient accepts M2B at discharge. Pharmacy has been updated to [facility].    Sources used to complete the med history include   PATIENT INTERVIEW   EPIC DISPENSE HISTORY  EPIC ALLERGY LIST   OARRS   Medications ADDED:  N/A  Medications CHANGED:  N/A  Medications REMOVED:   DICLOFENAC 50 MG   Below are additional concerns with the patient's PTA list.  Patient is an excellent historian - patient knows medication name dose frequency and indication   OARRS    8/9/2024 TRAMADOL 50 MG QTY: 15 DS: 5  7/23/2024 ALPRAZOLAM 0.5 MG QTY: 90 DS: 30  Jm Rojas CPhT  Transitions of Care Pharmacy Technician  [facility]  Please reach out via [employer] Secure Chat for questions, or if no response call [phone] or CallVU “MedRec”

## 2024-08-31 NOTE — NURSING NOTE
08/31/24  0300: : Pt admitted from ED  to 3 Room 335, Pt observed A/Ox4 , on room air  and with complain of nausea, weakness and paresthesia of the  RUE/RLE . Safety maintained, admission package provided, call light and personal items at reach.

## 2024-09-01 LAB
HCV RNA SERPL NAA+PROBE-ACNC: NOT DETECTED K[IU]/ML
HCV RNA SERPL NAA+PROBE-LOG IU: NORMAL {LOG_IU}/ML

## 2024-09-02 LAB
B BURGDOR.VLSE1+PEPC10 AB SER IA-ACNC: 0.18 IV
COPPER SERPL-MCNC: 97.9 UG/DL (ref 80–155)

## 2024-09-06 ENCOUNTER — APPOINTMENT (OUTPATIENT)
Dept: PRIMARY CARE | Facility: CLINIC | Age: 74
End: 2024-09-06
Payer: MEDICARE

## 2024-09-06 VITALS
HEIGHT: 64 IN | DIASTOLIC BLOOD PRESSURE: 64 MMHG | OXYGEN SATURATION: 93 % | BODY MASS INDEX: 20.49 KG/M2 | TEMPERATURE: 97 F | WEIGHT: 120 LBS | SYSTOLIC BLOOD PRESSURE: 120 MMHG

## 2024-09-06 DIAGNOSIS — N63.42 SUBAREOLAR MASS OF LEFT BREAST: ICD-10-CM

## 2024-09-06 DIAGNOSIS — M89.8X8 MASS OF SPINE: ICD-10-CM

## 2024-09-06 DIAGNOSIS — J06.9 ACUTE URI: Primary | ICD-10-CM

## 2024-09-06 DIAGNOSIS — C34.92 MALIGNANT NEOPLASM OF LEFT LUNG, UNSPECIFIED PART OF LUNG (MULTI): ICD-10-CM

## 2024-09-06 DIAGNOSIS — N63.20 MASS OF LEFT BREAST, UNSPECIFIED QUADRANT: ICD-10-CM

## 2024-09-06 LAB — POC SARS-COV-2 AG BINAX: NORMAL

## 2024-09-06 PROCEDURE — 4004F PT TOBACCO SCREEN RCVD TLK: CPT | Performed by: FAMILY MEDICINE

## 2024-09-06 PROCEDURE — 1170F FXNL STATUS ASSESSED: CPT | Performed by: FAMILY MEDICINE

## 2024-09-06 PROCEDURE — 1123F ACP DISCUSS/DSCN MKR DOCD: CPT | Performed by: FAMILY MEDICINE

## 2024-09-06 PROCEDURE — 3078F DIAST BP <80 MM HG: CPT | Performed by: FAMILY MEDICINE

## 2024-09-06 PROCEDURE — 3074F SYST BP LT 130 MM HG: CPT | Performed by: FAMILY MEDICINE

## 2024-09-06 PROCEDURE — 1111F DSCHRG MED/CURRENT MED MERGE: CPT | Performed by: FAMILY MEDICINE

## 2024-09-06 PROCEDURE — 87811 SARS-COV-2 COVID19 W/OPTIC: CPT | Performed by: FAMILY MEDICINE

## 2024-09-06 PROCEDURE — 1160F RVW MEDS BY RX/DR IN RCRD: CPT | Performed by: FAMILY MEDICINE

## 2024-09-06 PROCEDURE — 1159F MED LIST DOCD IN RCRD: CPT | Performed by: FAMILY MEDICINE

## 2024-09-06 PROCEDURE — 3008F BODY MASS INDEX DOCD: CPT | Performed by: FAMILY MEDICINE

## 2024-09-06 PROCEDURE — 99215 OFFICE O/P EST HI 40 MIN: CPT | Performed by: FAMILY MEDICINE

## 2024-09-06 RX ORDER — NIRMATRELVIR AND RITONAVIR 300-100 MG
3 KIT ORAL 2 TIMES DAILY
Qty: 30 TABLET | Refills: 0 | Status: SHIPPED | OUTPATIENT
Start: 2024-09-06 | End: 2024-09-11

## 2024-09-06 ASSESSMENT — ENCOUNTER SYMPTOMS
OCCASIONAL FEELINGS OF UNSTEADINESS: 1
DEPRESSION: 0
LOSS OF SENSATION IN FEET: 0

## 2024-09-06 ASSESSMENT — ACTIVITIES OF DAILY LIVING (ADL)
BATHING: INDEPENDENT
TAKING_MEDICATION: INDEPENDENT
DOING_HOUSEWORK: INDEPENDENT
MANAGING_FINANCES: INDEPENDENT
DRESSING: INDEPENDENT
GROCERY_SHOPPING: INDEPENDENT

## 2024-09-06 NOTE — PROGRESS NOTES
"Subjective   Patient ID: [id number]     Edu Domingo is a 73 y.o. female who presents for Medicare Annual Wellness Visit Subsequent (Fasting).  HPI  Originally scheduled for MWV.  Not done today.  She just got out of the hospital.      She also thinks she has covid infection.  She was exposed to covid.        DC summary from 8/31/24 reviewed today.    NS thought findings were Schwannoma related and not metastatic.  She did not have a lumbar puncture.    She complains of cough since Tuesday.  She was exposed to covid.  No fever.  Mild SOB.      No sore throat.  Has irritation in throat from drainage.  Nausea.  No diarrhea.      Objective     /64 (BP Location: Right arm, Patient Position: Sitting)   Temp 36.1 °C (97 °F) (Skin)   Ht 1.613 m (5' 3.5\")   Wt 54.4 kg (120 lb)   LMP  (LMP Unknown)   SpO2 93% Comment: room air  BMI 20.92 kg/m²      Physical Exam  Constitutional:       Appearance: Normal appearance.   HENT:      Nose: Rhinorrhea present.      Mouth/Throat:      Pharynx: No oropharyngeal exudate or posterior oropharyngeal erythema.   Eyes:      Conjunctiva/sclera: Conjunctivae normal.   Cardiovascular:      Rate and Rhythm: Normal rate and regular rhythm.      Heart sounds: Normal heart sounds. No murmur heard.  Pulmonary:      Effort: Pulmonary effort is normal. No respiratory distress.      Breath sounds: Normal breath sounds.   Musculoskeletal:      Cervical back: Neck supple. No rigidity.   Neurological:      General: No focal deficit present.      Mental Status: She is alert and oriented to person, place, and time.         Assessment/Plan   Problem List Items Addressed This Visit       Acute URI - Primary                   Relevant Medications    nirmatrelvir-ritonavir (Paxlovid) 300 mg (150 mg x 2)-100 mgi tablet therapy pack    Other Relevant Orders    POCT BinaxNOW Covid-19 Ag Card manually resulted (Completed)     Other Visit Diagnoses       Mass of spine        Relevant Orders    NM PET CT " whole body    Mass of left breast, unspecified quadrant        Relevant Orders    NM PET CT whole body    BI mammo bilateral diagnostic    Malignant neoplasm of left lung, unspecified part of lung (Multi)        Relevant Orders    NM PET CT whole body    Subareolar mass of left breast        Relevant Orders    BI mammo bilateral diagnostic            Abram Salmeron,        Assessment & Plan  Acute URI    Orders:    POCT BinaxNOW Covid-19 Ag Card manually resulted    nirmatrelvir-ritonavir (Paxlovid) 300 mg (150 mg x 2)-100 mgi tablet therapy pack; Take 3 tablets by mouth 2 times a day for 5 days. Follow the instructions on the package    Mass of spine    Orders:    NM PET CT whole body; Future    Mass of left breast, unspecified quadrant    Orders:    NM PET CT whole body; Future    BI mammo bilateral diagnostic; Future    Malignant neoplasm of left lung, unspecified part of lung (Multi)    Orders:    NM PET CT whole body; Future    Subareolar mass of left breast    Orders:    BI mammo bilateral diagnostic; Future    I ordered a PET scan to evaluate your spinal masses.  I ordered a mammogram for your left breast. Make an appointment with Dr Callahan, neurosurgery.  Return in two weeks.    I printed an order for Paxlovid.  Keep testing for COVID.  Take this only of you test positive.  It was discussed that you should stop alprazolam and atorvastatin if you take Paxlovid.

## 2024-09-06 NOTE — PATIENT INSTRUCTIONS
I ordered a PET scan to evaluate your spinal masses.  I ordered a mammogram for your left breast. Make an appointment with Dr Callahan, neurosurgery.  Return in two weeks.    I printed an order for Paxlovid.  Keep testing for COVID.  Take this only of you test positive.  It was discussed that you should stop alprazolam and atorvastatin if you take Paxlovid.

## 2024-09-06 NOTE — ASSESSMENT & PLAN NOTE
Orders:    POCT BinaxNOW Covid-19 Ag Card manually resulted    nirmatrelvir-ritonavir (Paxlovid) 300 mg (150 mg x 2)-100 mgi tablet therapy pack; Take 3 tablets by mouth 2 times a day for 5 days. Follow the instructions on the package

## 2024-09-10 ENCOUNTER — OFFICE VISIT (OUTPATIENT)
Dept: PRIMARY CARE | Facility: CLINIC | Age: 74
End: 2024-09-10
Payer: MEDICARE

## 2024-09-10 VITALS
TEMPERATURE: 96.3 F | WEIGHT: 120 LBS | SYSTOLIC BLOOD PRESSURE: 102 MMHG | BODY MASS INDEX: 20.92 KG/M2 | DIASTOLIC BLOOD PRESSURE: 60 MMHG

## 2024-09-10 DIAGNOSIS — J20.9 ACUTE BRONCHITIS, UNSPECIFIED ORGANISM: Primary | ICD-10-CM

## 2024-09-10 PROCEDURE — 4004F PT TOBACCO SCREEN RCVD TLK: CPT | Performed by: FAMILY MEDICINE

## 2024-09-10 PROCEDURE — 3074F SYST BP LT 130 MM HG: CPT | Performed by: FAMILY MEDICINE

## 2024-09-10 PROCEDURE — 99214 OFFICE O/P EST MOD 30 MIN: CPT | Performed by: FAMILY MEDICINE

## 2024-09-10 PROCEDURE — 1159F MED LIST DOCD IN RCRD: CPT | Performed by: FAMILY MEDICINE

## 2024-09-10 PROCEDURE — 1160F RVW MEDS BY RX/DR IN RCRD: CPT | Performed by: FAMILY MEDICINE

## 2024-09-10 PROCEDURE — 1123F ACP DISCUSS/DSCN MKR DOCD: CPT | Performed by: FAMILY MEDICINE

## 2024-09-10 PROCEDURE — 3078F DIAST BP <80 MM HG: CPT | Performed by: FAMILY MEDICINE

## 2024-09-10 PROCEDURE — 1111F DSCHRG MED/CURRENT MED MERGE: CPT | Performed by: FAMILY MEDICINE

## 2024-09-10 RX ORDER — METHYLPREDNISOLONE 4 MG/1
TABLET ORAL
Qty: 21 TABLET | Refills: 0 | Status: SHIPPED | OUTPATIENT
Start: 2024-09-10 | End: 2024-09-17

## 2024-09-10 RX ORDER — AMOXICILLIN AND CLAVULANATE POTASSIUM 875; 125 MG/1; MG/1
875 TABLET, FILM COATED ORAL 2 TIMES DAILY
Qty: 20 TABLET | Refills: 0 | Status: SHIPPED | OUTPATIENT
Start: 2024-09-10 | End: 2024-09-20

## 2024-09-10 NOTE — PATIENT INSTRUCTIONS
I ordered an antibiotic and a steroid.  Use the inhaler you have at home.  Return after the PET scan and mammogram later this month.  Return right away if you are acutely worse.

## 2024-09-10 NOTE — PROGRESS NOTES
Subjective   Patient ID: [id number]     Edu Domingo is a 73 y.o. female who presents for Chest Congestion and Cough.  HPI  She complains of coughing and chest congestion.    She has been found to have enlarging spinal masses in the lumbar spine that were concerning for malignancy.  She has a history of lung cancer.  I ordered a PET scan after her last appointment here on 9/6/24.    She had coughing here on 9/6/24.  A covid test done at that visit was negative.    She is getting worse.  She has been in bed since being here on Friday.      She tested for covid again.  It is still negative.    She has a deep bronchial cough.      No chest pain.  She has shortness of breath.    She fell yesterday.    Got out of bed to go to the bathroom. She got dizzy as she was walking and fell.      PET scan and mammogram are scheduled later this month.  Objective     /60 (BP Location: Left arm, Patient Position: Sitting)   Temp 35.7 °C (96.3 °F) (Skin)   Wt 54.4 kg (120 lb)   LMP  (LMP Unknown)   BMI 20.92 kg/m²      Physical Exam  Constitutional:       General: She is not in acute distress.     Appearance: She is ill-appearing (appears weak. voice weak.  Frequent cough.).   Neck:      Comments: Kernig's neg.  CROM normal.  Cardiovascular:      Rate and Rhythm: Normal rate and regular rhythm.   Pulmonary:      Effort: Pulmonary effort is normal. No respiratory distress.      Breath sounds: Rhonchi (R>L rhochi) present.   Musculoskeletal:      Cervical back: Neck supple. No rigidity.   Lymphadenopathy:      Cervical: No cervical adenopathy.   Neurological:      Mental Status: She is alert.         Assessment/Plan   Problem List Items Addressed This Visit       Acute bronchitis - Primary    Relevant Medications    methylPREDNISolone (Medrol Dospak) 4 mg tablets    amoxicillin-pot clavulanate (Augmentin) 875-125 mg tablet     I ordered an antibiotic and a steroid.  Use the inhaler you have at home.  Return after the PET scan  and mammogram later this month.  Return right away if you are acutely worse.  Abram Salmeron, DO

## 2024-09-13 ENCOUNTER — APPOINTMENT (OUTPATIENT)
Dept: UROLOGY | Facility: CLINIC | Age: 74
End: 2024-09-13
Payer: MEDICARE

## 2024-09-16 ENCOUNTER — TELEPHONE (OUTPATIENT)
Dept: PRIMARY CARE | Facility: CLINIC | Age: 74
End: 2024-09-16
Payer: MEDICARE

## 2024-09-16 DIAGNOSIS — F32.0 DEPRESSION, MAJOR, SINGLE EPISODE, MILD (CMS-HCC): ICD-10-CM

## 2024-09-16 RX ORDER — ALPRAZOLAM 0.5 MG/1
0.5 TABLET ORAL 3 TIMES DAILY PRN
Qty: 90 TABLET | Refills: 0 | Status: SHIPPED | OUTPATIENT
Start: 2024-09-16

## 2024-09-16 NOTE — TELEPHONE ENCOUNTER
REFILL  MEDICATION:     Alprazolam 0.5 MG; One tablet 3 times a day as needed for anxiety.    PHARM: [facility]   PHARM NUMBER: [phone]    LR: 7/23/24        90 tablets with 0 refills   LV: 8/9/24  NV: 9/26/24

## 2024-09-19 ENCOUNTER — APPOINTMENT (OUTPATIENT)
Dept: PRIMARY CARE | Facility: CLINIC | Age: 74
End: 2024-09-19
Payer: MEDICARE

## 2024-09-20 ENCOUNTER — HOSPITAL ENCOUNTER (OUTPATIENT)
Dept: RADIOLOGY | Facility: CLINIC | Age: 74
Discharge: HOME | End: 2024-09-20
Payer: MEDICARE

## 2024-09-20 DIAGNOSIS — C34.92 MALIGNANT NEOPLASM OF LEFT LUNG, UNSPECIFIED PART OF LUNG (MULTI): ICD-10-CM

## 2024-09-20 DIAGNOSIS — M89.8X8 MASS OF SPINE: ICD-10-CM

## 2024-09-20 DIAGNOSIS — N63.20 MASS OF LEFT BREAST, UNSPECIFIED QUADRANT: ICD-10-CM

## 2024-09-20 PROCEDURE — 3430000001 HC RX 343 DIAGNOSTIC RADIOPHARMACEUTICALS: Performed by: FAMILY MEDICINE

## 2024-09-20 PROCEDURE — 78816 PET IMAGE W/CT FULL BODY: CPT | Mod: PS

## 2024-09-20 PROCEDURE — A9552 F18 FDG: HCPCS | Performed by: FAMILY MEDICINE

## 2024-09-20 RX ORDER — FLUDEOXYGLUCOSE F 18 200 MCI/ML
10.9 INJECTION, SOLUTION INTRAVENOUS
Status: COMPLETED | OUTPATIENT
Start: 2024-09-20 | End: 2024-09-20

## 2024-09-23 ENCOUNTER — OFFICE VISIT (OUTPATIENT)
Dept: PRIMARY CARE | Facility: CLINIC | Age: 74
End: 2024-09-23
Payer: MEDICARE

## 2024-09-23 VITALS
WEIGHT: 120 LBS | DIASTOLIC BLOOD PRESSURE: 62 MMHG | TEMPERATURE: 97.4 F | SYSTOLIC BLOOD PRESSURE: 130 MMHG | BODY MASS INDEX: 20.92 KG/M2

## 2024-09-23 DIAGNOSIS — D33.4 SCHWANNOMA OF SPINAL CORD (MULTI): Primary | ICD-10-CM

## 2024-09-23 PROCEDURE — 4004F PT TOBACCO SCREEN RCVD TLK: CPT | Performed by: FAMILY MEDICINE

## 2024-09-23 PROCEDURE — 3075F SYST BP GE 130 - 139MM HG: CPT | Performed by: FAMILY MEDICINE

## 2024-09-23 PROCEDURE — 1160F RVW MEDS BY RX/DR IN RCRD: CPT | Performed by: FAMILY MEDICINE

## 2024-09-23 PROCEDURE — 99213 OFFICE O/P EST LOW 20 MIN: CPT | Performed by: FAMILY MEDICINE

## 2024-09-23 PROCEDURE — 1123F ACP DISCUSS/DSCN MKR DOCD: CPT | Performed by: FAMILY MEDICINE

## 2024-09-23 PROCEDURE — 1159F MED LIST DOCD IN RCRD: CPT | Performed by: FAMILY MEDICINE

## 2024-09-23 PROCEDURE — 1111F DSCHRG MED/CURRENT MED MERGE: CPT | Performed by: FAMILY MEDICINE

## 2024-09-23 PROCEDURE — 3078F DIAST BP <80 MM HG: CPT | Performed by: FAMILY MEDICINE

## 2024-09-23 RX ORDER — TRAMADOL HYDROCHLORIDE 50 MG/1
50 TABLET ORAL EVERY 8 HOURS PRN
Qty: 60 TABLET | Refills: 0 | Status: SHIPPED | OUTPATIENT
Start: 2024-09-23 | End: 2024-10-13

## 2024-09-23 NOTE — PATIENT INSTRUCTIONS
I ordered referrals to neurosurgery and physiatry for your back pain that at this time does not appear to be from a malignancy.  Follow up with the diagnostic mammogram as scheduled tomorrow.  Return in one month.

## 2024-09-23 NOTE — PROGRESS NOTES
Subjective   Patient ID: [id number]     Edu Domingo is a 73 y.o. female who presents for Follow-up (Discuss PET scan results.).  HPI    She is here for a follow up to her PET scan.  She had a spinal lesion that was concerning for malignancy in the setting of known lung cancer.  I referred her to  for a lumbar puncture but this was not done during her admission there.  Just to be more careful to rule out a malignant mass in the spine, I ordered a PET scan.    The PET scan showed no hypermetabolic changes in the spinal mass.  Therefore, it is likely that this is a schwannoma, as suggested by the MRI report.     She feels a breast mass.  She has a diagnostic mammo tomorrow.          Objective     /62 (BP Location: Right arm, Patient Position: Sitting)   Temp 36.3 °C (97.4 °F) (Skin)   Wt 54.4 kg (120 lb)   LMP  (LMP Unknown)   BMI 20.92 kg/m²      Physical Exam  Constitutional:       Appearance: Normal appearance.   Musculoskeletal:      Comments: Tender at lumbar paraspinals.     Neurological:      Mental Status: She is alert.         Assessment/Plan   Problem List Items Addressed This Visit    None  Visit Diagnoses       Schwannoma of spinal cord (Multi)    -  Primary    Relevant Medications    traMADol (Ultram) 50 mg tablet    Other Relevant Orders    Referral to Neurosurgery    Referral to Physical Medicine Rehab        I ordered referrals to neurosurgery and physiatry for your back pain that at this time does not appear to be from a malignancy.  Follow up with the diagnostic mammogram as scheduled tomorrow.  Return in one month.    Abram Salmeron DO

## 2024-09-24 ENCOUNTER — HOSPITAL ENCOUNTER (OUTPATIENT)
Dept: RADIOLOGY | Facility: CLINIC | Age: 74
Discharge: HOME | End: 2024-09-24
Payer: MEDICARE

## 2024-09-24 VITALS — HEIGHT: 65 IN | BODY MASS INDEX: 19.66 KG/M2 | WEIGHT: 118 LBS

## 2024-09-24 DIAGNOSIS — N63.20 UNSPECIFIED LUMP IN THE LEFT BREAST, UNSPECIFIED QUADRANT: ICD-10-CM

## 2024-09-24 DIAGNOSIS — N63.25 UNSPECIFIED LUMP IN THE LEFT BREAST, OVERLAPPING QUADRANTS: ICD-10-CM

## 2024-09-24 DIAGNOSIS — N63.42 SUBAREOLAR MASS OF LEFT BREAST: ICD-10-CM

## 2024-09-24 DIAGNOSIS — N63.20 MASS OF LEFT BREAST, UNSPECIFIED QUADRANT: ICD-10-CM

## 2024-09-24 PROCEDURE — 77066 DX MAMMO INCL CAD BI: CPT | Performed by: STUDENT IN AN ORGANIZED HEALTH CARE EDUCATION/TRAINING PROGRAM

## 2024-09-24 PROCEDURE — 76981 USE PARENCHYMA: CPT | Mod: LT

## 2024-09-24 PROCEDURE — 77062 BREAST TOMOSYNTHESIS BI: CPT

## 2024-09-24 PROCEDURE — 76642 ULTRASOUND BREAST LIMITED: CPT | Performed by: STUDENT IN AN ORGANIZED HEALTH CARE EDUCATION/TRAINING PROGRAM

## 2024-09-24 PROCEDURE — 76642 ULTRASOUND BREAST LIMITED: CPT | Mod: LT

## 2024-09-24 PROCEDURE — G0279 TOMOSYNTHESIS, MAMMO: HCPCS | Performed by: STUDENT IN AN ORGANIZED HEALTH CARE EDUCATION/TRAINING PROGRAM

## 2024-09-26 ENCOUNTER — APPOINTMENT (OUTPATIENT)
Dept: PRIMARY CARE | Facility: CLINIC | Age: 74
End: 2024-09-26
Payer: MEDICARE

## 2024-10-21 ENCOUNTER — APPOINTMENT (OUTPATIENT)
Dept: CARDIOLOGY | Facility: CLINIC | Age: 74
End: 2024-10-21
Payer: MEDICARE

## 2024-10-25 ENCOUNTER — TELEPHONE (OUTPATIENT)
Dept: PRIMARY CARE | Facility: CLINIC | Age: 74
End: 2024-10-25

## 2024-10-25 DIAGNOSIS — M54.50 CHRONIC RIGHT-SIDED LOW BACK PAIN, UNSPECIFIED WHETHER SCIATICA PRESENT: Primary | ICD-10-CM

## 2024-10-25 DIAGNOSIS — G89.29 CHRONIC RIGHT-SIDED LOW BACK PAIN, UNSPECIFIED WHETHER SCIATICA PRESENT: Primary | ICD-10-CM

## 2024-10-25 RX ORDER — TRAMADOL HYDROCHLORIDE 50 MG/1
50 TABLET ORAL EVERY 8 HOURS PRN
Qty: 60 TABLET | Refills: 0 | Status: SHIPPED | OUTPATIENT
Start: 2024-10-25 | End: 2024-11-14

## 2024-10-25 NOTE — TELEPHONE ENCOUNTER
REFILL  MEDICATION:     Tramadol 50 MG; Take 1 tablet every 8 hours if needed for severe pain for up to 5 days.    PHARM: [facility]   PHARM NUMBER: [phone]    LR: 8/9/24      15 tablets with 0 refills   LV: 9/23/24  NV: No future appt.

## 2024-10-31 ENCOUNTER — TELEPHONE (OUTPATIENT)
Dept: PRIMARY CARE | Facility: CLINIC | Age: 74
End: 2024-10-31
Payer: MEDICARE

## 2024-10-31 DIAGNOSIS — F32.0 DEPRESSION, MAJOR, SINGLE EPISODE, MILD (CMS-HCC): ICD-10-CM

## 2024-10-31 DIAGNOSIS — Z98.61 CAD S/P PERCUTANEOUS CORONARY ANGIOPLASTY: ICD-10-CM

## 2024-10-31 DIAGNOSIS — I10 PRIMARY HYPERTENSION: ICD-10-CM

## 2024-10-31 DIAGNOSIS — I25.10 CAD S/P PERCUTANEOUS CORONARY ANGIOPLASTY: ICD-10-CM

## 2024-10-31 RX ORDER — NITROGLYCERIN 0.4 MG/1
0.4 TABLET SUBLINGUAL EVERY 5 MIN PRN
Qty: 90 TABLET | Refills: 0 | Status: SHIPPED | OUTPATIENT
Start: 2024-10-31

## 2024-10-31 RX ORDER — AMLODIPINE BESYLATE 5 MG/1
5 TABLET ORAL DAILY
Qty: 30 TABLET | Refills: 2 | Status: SHIPPED | OUTPATIENT
Start: 2024-10-31 | End: 2025-04-29

## 2024-10-31 RX ORDER — CARVEDILOL 25 MG/1
25 TABLET ORAL
Qty: 180 TABLET | Refills: 1 | Status: SHIPPED | OUTPATIENT
Start: 2024-10-31

## 2024-11-04 ENCOUNTER — OFFICE VISIT (OUTPATIENT)
Facility: CLINIC | Age: 74
End: 2024-11-04
Payer: MEDICARE

## 2024-11-04 VITALS
SYSTOLIC BLOOD PRESSURE: 148 MMHG | WEIGHT: 120 LBS | HEART RATE: 75 BPM | BODY MASS INDEX: 19.99 KG/M2 | DIASTOLIC BLOOD PRESSURE: 70 MMHG | HEIGHT: 65 IN | TEMPERATURE: 97.4 F

## 2024-11-04 DIAGNOSIS — D33.4 SCHWANNOMA OF SPINAL CORD (MULTI): ICD-10-CM

## 2024-11-04 DIAGNOSIS — M54.16 LUMBAR RADICULOPATHY: Primary | ICD-10-CM

## 2024-11-04 ASSESSMENT — PATIENT HEALTH QUESTIONNAIRE - PHQ9
SUM OF ALL RESPONSES TO PHQ9 QUESTIONS 1 & 2: 2
2. FEELING DOWN, DEPRESSED OR HOPELESS: SEVERAL DAYS
10. IF YOU CHECKED OFF ANY PROBLEMS, HOW DIFFICULT HAVE THESE PROBLEMS MADE IT FOR YOU TO DO YOUR WORK, TAKE CARE OF THINGS AT HOME, OR GET ALONG WITH OTHER PEOPLE: SOMEWHAT DIFFICULT
1. LITTLE INTEREST OR PLEASURE IN DOING THINGS: SEVERAL DAYS

## 2024-11-04 ASSESSMENT — PAIN SCALES - GENERAL: PAINLEVEL_OUTOF10: 9

## 2024-11-04 NOTE — PROGRESS NOTES
[facility] [place]  Department of Neurological Surgery  New Patient Visit    History of Present Illness:  dEu Domingo is a 73 y.o. year old female who presents to the spine clinic with 4-6 months of worsening mechanical back pain. Her pain is related to degenerative changes in her low back. The more she does, the more she hurts. Her back pain does not radiate past the hips, but occasionally into the thighs. She is taking tramadol for relief. She has not performed PT or pain management.     Prior Spine Surgeries: none    Physical Therapy: no    Diabetic: no     Osteoporosis: osteopenia  XR DEXA bone density    Result Date: 3/1/2024  DEXA:  According to World Health Organization criteria, classification is low bone mass (osteopenia)   Followup recommended in two years or sooner as clinically warranted.   All images and detailed analysis are available on the  Radiology PACS.   MACRO: None   Signed by: Jose Goldsmith 3/1/2024 10:23 AM Dictation workstation:   [id number]     Patient's BMI is Body mass index is 19.97 kg/m².    Review of Systems:  14/14 systems reviewed and negative other than what is listed in the history of present illness    Patient Active Problem List   Diagnosis    Abdominal pain    Abnormal EKG    Adenocarcinoma of lung (Multi)    Antibiotic-associated diarrhea    Anxiety    Atypical chest pain    Back skin lesion    Benign skin cyst    Bilateral carotid bruits    Blepharitis    Bright red rectal bleeding    Bruit of left carotid artery    CAD S/P percutaneous coronary angioplasty    Cellulitis of left axilla    Cellulitis of right axilla    Cellulitis of right ear    Cellulitis, lip    Chest pain    Colitis    Depression, major, single episode, mild (CMS-HCC)    Dizziness    Essential hypertension    Excessive ear wax, bilateral    External hemorrhoids    GERD (gastroesophageal reflux disease)    Hand injury, right, initial encounter    Cephalgia    Headache    Chronic hip  pain    Hip pain, left    Hip pain, right    Hives    Hyperlipidemia    Hypertension    Left lumbar radiculitis    Menopausal state    Multiple dysplastic nevi    Near syncope    Nocturia    Nicotine use disorder    Arthritis, degenerative    Osteoarthritis    Overactive bladder    Pain due to onychomycosis of nail    Palpitations    Pneumonia    Post-traumatic osteoarthritis of both ankles    Primary nocturnal enuresis    Radial nerve compression    Rectal bleed    Skin lesion of lower extremity    Squamous cell skin cancer    STEMI (ST elevation myocardial infarction) (Multi)    Carotid artery stenosis    Stenosis of right carotid artery    Stye    Subcutaneous mass    Syncope    Urge incontinence of urine    Urinary incontinence    Urinary urgency    UTI (urinary tract infection)    Vulvar intraepithelial neoplasia (DAMIEN) grade 3    Nephrolithiasis    Sprain of right hand    Skin lesion of face    Acute URI    Right otitis externa    Pharyngitis, acute    Acute sinusitis    Acute otitis media    Acute otitis externa    Acute bronchitis    Cellulitis of face    Non-sustained ventricular tachycardia (Multi)    Actinic keratosis    Other seborrheic keratosis    Chronic cough    Coronary artery disease with unstable angina pectoris (Multi)    Edema of both lower extremities    General weakness    Hematuria    Hypertensive renovascular disease    Injury of radial nerve    Neoplasm of uncertain behavior of skin    Osteoarthritis of carpometacarpal (CMC) joint of thumb    Other melanin hyperpigmentation    Personal history of other malignant neoplasm of skin    Pyelonephritis    Sleep attack    Subclavian steal syndrome    Greater trochanteric pain syndrome    Benign neoplasm of soft tissues of upper limb    Hemangioma of skin and subcutaneous tissue    Abscess of face    Squamous cell carcinoma of skin of lower limb, including hip    BMI 22.0-22.9, adult    Nuclear sclerotic cataract of both eyes    Vulvar dysplasia     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)    Stage 3a chronic kidney disease (Multi)    Depression    Cardiomyopathy, ischemic    Right nephrolithiasis    Spinal cord lesion (Multi)     Past Medical History:   Diagnosis Date    Anxiety     Anxiety disorder, unspecified     Anxiety    Calculus of kidney     Bilateral kidney stones    Cataracts, bilateral     Chronic kidney disease     stage 3    COPD (chronic obstructive pulmonary disease) (Multi)     Coronary artery disease     Hypertension     Lung cancer (Multi)     Major depressive disorder, recurrent, in partial remission (CMS-[facility]) 09/21/2021    Recurrent major depressive disorder, in partial remission    Nephrolithiasis     Old myocardial infarction     History of myocardial infarction    Other conditions influencing health status     Coronary Artery Disease    Other transient cerebral ischemic attacks and related syndromes     Subclavian steal syndrome    Personal history of other diseases of the circulatory system     History of hypertension    Personal history of other malignant neoplasm of bronchus and lung     Personal history of malignant neoplasm of lung    Personal history of other specified conditions 01/10/2019    History of abdominal pain    Skin cancer     squamous cell carcinoma     Past Surgical History:   Procedure Laterality Date    CT ANGIO NECK  11/26/2021    CT NECK ANGIO W AND WO IV CONTRAST 11/26/2021 [facility]    CT ANGIO NECK  11/23/2022    CT NECK ANGIO W AND WO IV CONTRAST 11/23/2022 [facility]    CT HEAD ANGIO W AND WO IV CONTRAST  11/26/2021    CT HEAD ANGIO W AND WO IV CONTRAST 11/26/2021 [facility] CL[facility]ICAL [facility]GAC[facility]    LUNG LOBECTOMY  04/22/2014    Lung Lobectomy    OTHER SURGICAL HISTORY  08/20/2013    Cath Stent 1 Type Drug-Eluting    OTHER SURGICAL HISTORY  08/20/2013    Cardiac Cath Procedure Outcome: Successful    OTHER SURGICAL HISTORY  04/22/2014    Destruction Of Malignant Lesion     Social History      Tobacco Use    Smoking status: Every Day     Current packs/day: 0.50     Average packs/day: 0.5 packs/day for 52.8 years (26.4 ttl pk-yrs)     Types: Cigarettes     Start date: 1972    Smokeless tobacco: Never   Substance Use Topics    Alcohol use: Not Currently     family history includes Breast cancer in her mother's sister; Breast cancer (age of onset: 30) in her mother; Cancer in her cousin; Diabetes in her maternal grandfather and maternal grandmother; Heart disease in her mother; Ovarian cancer in her mother's sister; Prostate cancer in her father; Rectal cancer in her mother; cardiac disorder in her mother.    Current Outpatient Medications:     acetaminophen (TYLENOL ORAL), Take 1 tablet by mouth once daily as needed., Disp: , Rfl:     albuterol 90 mcg/actuation inhaler, INHALE 2 PUFFS BY MOUTH AS INSTRUCTED EVERY 4 HOURS AS NEEDED FOR WHEEZING / SHORTNESS OF BREATH, Disp: 18 g, Rfl: 0    ALPRAZolam (Xanax) 0.5 mg tablet, Take 1 tablet (0.5 mg) by mouth 3 times a day as needed for anxiety., Disp: 90 tablet, Rfl: 0    ALPRAZolam (Xanax) 0.5 mg tablet, Take 1 tablet (0.5 mg) by mouth 3 times a day as needed for anxiety., Disp: 90 tablet, Rfl: 0    amLODIPine (Norvasc) 5 mg tablet, Take 1 tablet (5 mg) by mouth once daily., Disp: 30 tablet, Rfl: 2    ammonium lactate (Lac-Hydrin) 12 % lotion, Apply topically if needed for dry skin., Disp: 296 g, Rfl: 11    Anoro Ellipta 62.5-25 mcg/actuation blister with device, INHALE 1 PUFF BY MOUTH AS INSTRUCTED ONCE DAILY., Disp: 60 each, Rfl: 3    aspirin 81 mg EC tablet, Take 1 tablet (81 mg) by mouth once daily., Disp: , Rfl:     atorvastatin (Lipitor) 80 mg tablet, Take 1 tablet (80 mg) by mouth once daily., Disp: 90 tablet, Rfl: 1    carvedilol (Coreg) 25 mg tablet, Take 1 tablet (25 mg) by mouth 2 times daily (morning and late afternoon)., Disp: 180 tablet, Rfl: 1    celecoxib (CeleBREX) 200 mg capsule, Take 1 capsule (200 mg) by mouth once daily as needed for  moderate pain (4 - 6)., Disp: 90 capsule, Rfl: 1    cetirizine HCl (ZYRTEC ORAL), Take 1 capsule by mouth once daily as needed., Disp: , Rfl:     cholecalciferol (Vitamin D-3) 50 MCG (2000 UT) tablet, Take by mouth., Disp: , Rfl:     furosemide (Lasix) 20 mg tablet, Take 1 tablet (20 mg) by mouth once daily., Disp: 30 tablet, Rfl: 0    hydrocortisone 2.5 % cream, Apply topically 2 times a day as needed for irritation or rash., Disp: 30 g, Rfl: 0    nitroglycerin (Nitrostat) 0.4 mg SL tablet, Place 1 tablet (0.4 mg) under the tongue every 5 minutes if needed (angina). Times three PRN, Disp: 90 tablet, Rfl: 0    traMADol (Ultram) 50 mg tablet, Take 1 tablet (50 mg) by mouth every 8 hours if needed for severe pain (7 - 10) for up to 20 days., Disp: 60 tablet, Rfl: 0    venlafaxine XR (Effexor-XR) 37.5 mg 24 hr capsule, Take 1 capsule (37.5 mg) by mouth once daily. Do not crush or chew., Disp: 90 capsule, Rfl: 0    Gemtesa 75 mg tablet, TAKE ONE TABLET BY MOUTH DAILY (Patient not taking: Reported on 11/4/2024), Disp: 30 tablet, Rfl: 2    lisinopril 20 mg tablet, Take 1 tablet (20 mg) by mouth once daily. (Patient not taking: Reported on 11/4/2024), Disp: 30 tablet, Rfl: 1    magnesium oxide (Mag-Ox) 400 mg tablet, Take 1 tablet (400 mg) by mouth once daily. (Patient not taking: Reported on 11/4/2024), Disp: 90 tablet, Rfl: 1    oxygen (O2) gas therapy, at bedtime, Disp: , Rfl:   Allergies   Allergen Reactions    Morphine Unknown     agitation    Myrbetriq [Mirabegron] Other     Elevated blood pressure.    Oxycodone-Acetaminophen Unknown     agitation    Sulfa (Sulfonamide Antibiotics) Unknown       Physical Examination    General: Well developed, awake/alert/oriented x3, no distress, alert and cooperative  Skin: Warm and dry, no lesions, no rashes  ENMT: Mucous membranes moist, no apparent injury, no lesions seen  Head/Neck: Neck Supple, no apparent injury  Respiratory/Thorax: Normal breath sounds with good chest  expansion, thorax symmetric  Cardiovascular: No pitting edema, no JVD    Motor Strength: 5/5 Throughout all extremities    Muscle Bulk: Normal and symmetric in all extremities    Posture:   -- Cervical: Normal  -- Thoracic: Normal  -- Lumbar : Normal  Paraspinal muscle spasm/tenderness absent.     Sensation: intact to light touch    Low back pain  No radiculopathy    Results    I personally reviewed and interpreted the imaging results which included CT and MRI showing severe degenerative disc disease L4-5, L5-S1 with moderate spinal stenosis and grade 1 spondylolisthesis at L4-5. Homogenously enhancing lesion along a nerve root in the lumbar spine consistent with a schwannoma.     Assessment and Plan:    Edu Domingo is a 73 y.o. year old female who presents to the spine clinic with 4-6 months of worsening mechanical back pain. Her pain is related to degenerative changes in her low back. The more she does, the more she hurts. Her back pain does not radiate past the hips, but occasionally into the thighs. She is taking tramadol for relief. She has not performed PT or pain management.     At this time, I would recommend PT and pain management. She will FU if these are to fail for consideration of surgery as a last resort.        I have reviewed all prior documentation and reviewed the electronic medical record since admission. I have personally have reviewed all advanced imaging not just the reports and used my interpretation as documented as the relevant findings. I have reviewed the risks and benefits of all treatment recommendations listed in this note with the patient and family.       The above clinical summary has been dictated with voice recognition software. It has not been proofread for grammatical errors, typographical mistakes, or other semantic inconsistencies.    Thank you for visiting our office today. It was our pleasure to take part in your healthcare.     Do not hesitate to call with any questions  regarding your plan of care after leaving at [phone] M-F 8am-4pm.     To clinicians, thank you very much for this kind referral. It is a privilege to partner with you in the care of your patients. My office would be delighted to assist you with any further consultations or with questions regarding the plan of care outlined. Do not hesitate to call the office or contact me directly.       Sincerely,      Kaushal Callahan MD, [facility]  Spine , [facility]  Andriy Carson Chair in Spinal Neurosurgery  Complex Spine Surgery Fellowship Director   of Neurological Surgery  [facility] [employer]  Phone: [phone]  Fax: [phone]        Scribe Attestation  By signing my name below, I Jessica Catina , Scribe   attest that this documentation has been prepared under the direction and in the presence of Kaushal Callahan MD.

## 2024-11-07 ENCOUNTER — TELEMEDICINE (OUTPATIENT)
Dept: PRIMARY CARE | Facility: CLINIC | Age: 74
End: 2024-11-07
Payer: MEDICARE

## 2024-11-07 DIAGNOSIS — J02.9 ACUTE PHARYNGITIS, UNSPECIFIED ETIOLOGY: Primary | ICD-10-CM

## 2024-11-07 PROCEDURE — 4004F PT TOBACCO SCREEN RCVD TLK: CPT | Performed by: FAMILY MEDICINE

## 2024-11-07 PROCEDURE — 1123F ACP DISCUSS/DSCN MKR DOCD: CPT | Performed by: FAMILY MEDICINE

## 2024-11-07 PROCEDURE — 1159F MED LIST DOCD IN RCRD: CPT | Performed by: FAMILY MEDICINE

## 2024-11-07 PROCEDURE — 99213 OFFICE O/P EST LOW 20 MIN: CPT | Performed by: FAMILY MEDICINE

## 2024-11-07 PROCEDURE — 1160F RVW MEDS BY RX/DR IN RCRD: CPT | Performed by: FAMILY MEDICINE

## 2024-11-07 RX ORDER — AZITHROMYCIN 250 MG/1
TABLET, FILM COATED ORAL
Qty: 6 TABLET | Refills: 0 | Status: SHIPPED | OUTPATIENT
Start: 2024-11-07 | End: 2024-11-12

## 2024-11-07 NOTE — PROGRESS NOTES
Subjective   Patient ID: [id number]   Virtual or Telephone Consent    An interactive audio and video telecommunication system which permits real time communications between the patient (at the originating site) and provider (at the distant site) was utilized to provide this telehealth service.   Verbal consent was requested and obtained from Edu Domingo on this date, 11/07/24 for a telehealth visit.     Edu Domingo is a 73 y.o. female who presents for cough, sore throat and ear pain.  HPI  She has a sore throat, especially on the right side.  She has right ear pain.      This started two or three days ago.      Objective     LMP  (LMP Unknown)      Physical Exam  Constitutional:       General: She is not in acute distress.     Appearance: She is not toxic-appearing.   Pulmonary:      Effort: Pulmonary effort is normal.      Breath sounds: No rhonchi.   Neurological:      Mental Status: She is alert.         Assessment/Plan   Problem List Items Addressed This Visit       Pharyngitis, acute - Primary    Relevant Medications    azithromycin (Zithromax) 250 mg tablet     I will order antibiotics for your suspected infection.      You will follow up with pain management as per the neurosurgery recommendations earlier this week.  Abram Salmeron DO

## 2024-11-11 ENCOUNTER — TELEPHONE (OUTPATIENT)
Dept: PRIMARY CARE | Facility: CLINIC | Age: 74
End: 2024-11-11
Payer: MEDICARE

## 2024-11-11 DIAGNOSIS — F32.0 DEPRESSION, MAJOR, SINGLE EPISODE, MILD (CMS-HCC): ICD-10-CM

## 2024-11-11 NOTE — TELEPHONE ENCOUNTER
REFILL  MEDICATION:     Alprazolam 0.5 MG; Take 1 tablet 3 times a day as needed for anxiety.     PHARM: [facility]   PHARM NUMBER: [phone]    LR: 9/16/24     90 tablets with 0 refills  LV: 9/23/24  NV: No future appt.

## 2024-11-12 RX ORDER — ALPRAZOLAM 0.5 MG/1
0.5 TABLET ORAL 3 TIMES DAILY PRN
Qty: 90 TABLET | Refills: 0 | Status: SHIPPED | OUTPATIENT
Start: 2024-11-12

## 2024-11-15 ENCOUNTER — TELEMEDICINE (OUTPATIENT)
Dept: PRIMARY CARE | Facility: CLINIC | Age: 74
End: 2024-11-15
Payer: MEDICARE

## 2024-11-15 DIAGNOSIS — J20.9 ACUTE BRONCHITIS, UNSPECIFIED ORGANISM: Primary | ICD-10-CM

## 2024-11-15 PROCEDURE — 99213 OFFICE O/P EST LOW 20 MIN: CPT | Performed by: FAMILY MEDICINE

## 2024-11-15 PROCEDURE — 1123F ACP DISCUSS/DSCN MKR DOCD: CPT | Performed by: FAMILY MEDICINE

## 2024-11-15 PROCEDURE — 1159F MED LIST DOCD IN RCRD: CPT | Performed by: FAMILY MEDICINE

## 2024-11-15 PROCEDURE — 4004F PT TOBACCO SCREEN RCVD TLK: CPT | Performed by: FAMILY MEDICINE

## 2024-11-15 PROCEDURE — 1160F RVW MEDS BY RX/DR IN RCRD: CPT | Performed by: FAMILY MEDICINE

## 2024-11-15 RX ORDER — METHYLPREDNISOLONE 4 MG/1
TABLET ORAL
Qty: 21 TABLET | Refills: 0 | Status: SHIPPED | OUTPATIENT
Start: 2024-11-15 | End: 2024-11-22

## 2024-11-15 RX ORDER — BENZONATATE 200 MG/1
200 CAPSULE ORAL 3 TIMES DAILY PRN
Qty: 21 CAPSULE | Refills: 0 | Status: SHIPPED | OUTPATIENT
Start: 2024-11-15 | End: 2024-12-15

## 2024-11-15 NOTE — PROGRESS NOTES
Subjective   Patient ID: [id number]   Virtual or Telephone Consent    An interactive audio and video telecommunication system which permits real time communications between the patient (at the originating site) and provider (at the distant site) was utilized to provide this telehealth service.   Verbal consent was requested and obtained from Edu Domingo on this date, 11/15/24 for a telehealth visit.     Edu Domingo is a 73 y.o. female who presents for a cough that has persisted despite antibiotics.      Her ear pain and sore throat are better.      She has a worsened cough and she has developed wheezing.      If she real still, she does not cough as much.         HPI    She has a persistent cough.  She was given a Zpack on 11/7/24 for a sore throat and cough.        Objective     LMP  (LMP Unknown)      Physical Exam  Constitutional:       Appearance: She is not ill-appearing or toxic-appearing.   Pulmonary:      Effort: Pulmonary effort is normal. No respiratory distress.   Neurological:      General: No focal deficit present.      Mental Status: She is alert. Mental status is at baseline.         Assessment/Plan   Problem List Items Addressed This Visit       Acute bronchitis - Primary    Relevant Medications    methylPREDNISolone (Medrol Dospak) 4 mg tablets    benzonatate (Tessalon) 200 mg capsule     I ordered a cough suppressant and a steroid.  Return in one week for an in person appointment if this continues and sooner if it worsens.    Abram Salmeron,

## 2024-11-15 NOTE — PATIENT INSTRUCTIONS
I ordered a cough suppressant and a steroid.  Return in one week for an in person appointment if this continues and sooner if it worsens.

## 2024-11-21 ENCOUNTER — CONSULT (OUTPATIENT)
Dept: ORTHOPEDIC SURGERY | Facility: CLINIC | Age: 74
End: 2024-11-21
Payer: MEDICARE

## 2024-11-21 DIAGNOSIS — M48.062 LUMBAR STENOSIS WITH NEUROGENIC CLAUDICATION: Primary | ICD-10-CM

## 2024-11-21 DIAGNOSIS — M47.816 LUMBAR SPONDYLOSIS: ICD-10-CM

## 2024-11-21 DIAGNOSIS — D33.4 SCHWANNOMA OF SPINAL CORD (MULTI): ICD-10-CM

## 2024-11-21 DIAGNOSIS — M54.16 LUMBAR RADICULITIS: ICD-10-CM

## 2024-11-21 PROCEDURE — 99205 OFFICE O/P NEW HI 60 MIN: CPT | Performed by: PHYSICAL MEDICINE & REHABILITATION

## 2024-11-21 PROCEDURE — 99215 OFFICE O/P EST HI 40 MIN: CPT | Performed by: PHYSICAL MEDICINE & REHABILITATION

## 2024-11-21 RX ORDER — PREGABALIN 25 MG/1
25 CAPSULE ORAL 2 TIMES DAILY
Qty: 60 CAPSULE | Refills: 1 | Status: SHIPPED | OUTPATIENT
Start: 2024-11-21 | End: 2024-12-21

## 2024-11-21 ASSESSMENT — PAIN SCALES - GENERAL: PAINLEVEL_OUTOF10: 4

## 2024-11-21 ASSESSMENT — PAIN - FUNCTIONAL ASSESSMENT: PAIN_FUNCTIONAL_ASSESSMENT: 0-10

## 2024-11-21 ASSESSMENT — PAIN DESCRIPTION - DESCRIPTORS: DESCRIPTORS: OTHER (COMMENT);SHOOTING

## 2024-11-21 NOTE — PROGRESS NOTES
New Consult/New Patient Note    11/21/2024   Abram Salmeron S, DO    Assessment: Very pleasant 73-year-old female with chronic lower back and bilateral leg pain and symptoms.  Pain can be intermittently severe and impact her daily activity as well as her ambulation.  -Moderate to severe central canal lumbar stenosis at L4-5.  -Multilevel foraminal narrowing with facet arthropathy  -Lumbar schwannomas-follows with surgical spine    PLAN:  1)  Imaging/Diagnostic Studies: Since reviewed interpreted most recent lumbar x-ray and MRI.  Also reviewed her cervical MRI which also shows multilevel central canal stenosis and foraminal narrowing.  Reviewed Dr. Callahan's most recent note as well reviewed most recent blood work-creatinine seems improved from prior.  2)  Therapy/Rehabilitation: New consult provided for physical therapy  3)  Pharmacological Management: New Rx provided for low-dose Lyrica with instructions titrate slowly, as needed and as tolerated.  She did not tolerate gabapentin well in the past.  Advised to use tramadol sparingly and only for severe breakthrough pain.  Educated over-the-counter Tylenol use  4)  Spine/Surgical Interventions: At this time she would like to schedule a L5-S1 interlaminar epidural steroid injection.  This to be done with fluoroscopic guidance and therapeutic.  She has had over 6 weeks of prior therapy including medications.  Advised that she should seek further surgical care if she has any worsening neurological deficits.  Understands potential risks including but not limited to bleeding, infection, irritating or damaging the nerves in this area as well as dural puncture  5)  Alternative Treatments: May consider alternative treatment options in the future including manipulation (chiropractor versus osteopathic) and/or acupuncture if patient does not obtain optimal relief with initial treatment plan.  6)  Consultations: Physical therapy  7)  Follow -up: 4-6 weeks or PRN if symptoms  worsen/do not improve.   8)  Future treatment considerations: Further titration of Lyrica, repeat injection as needed.  May also benefit from lumbar medial branch blocks    Patient advised of the difference between hurt and harm and advised to continue with all normal activities and exercises. Patient verbalized understanding of the above plan and was happy with the care provided.      The above clinical summary has been dictated with voice recognition software. It has not been proofread for grammatical errors, typographical mistakes, or other semantic inconsistencies.    Thank you for visiting our office today. It was our pleasure to take part in your healthcare.     Do not hesitate to call with any questions regarding your plan of care after leaving at [phone]    To clinicians, thank you very much for this kind referral. It is a privilege to partner with you in the care of your patients. My office would be delighted to assist you with any further consultations or with questions regarding the plan of care outlined. Do not hesitate to call the office or contact me directly.     Sincerely,    LETI Guzman MD  , Physical Medicine and Rehabilitation, Orthopedic Spine  [facility]  [facility]         Edu Domingo   is a 73 y.o. female who presents with chronic lower back pain.  Recently evaluated by Dr. Kaushal Callahan from surgical spine-recommended further conservative treatment.  Noted moderate spinal canal stenosis as well as likely schwannoma.  Pain worse for the past 4-6 months.    Location: Main area pain is at the right lower back  Radiation: primarily axial, intermittently to the left leg  Quality:  achy, burning, sharp   current 3-4/10,  at its worst 9-10/10  Exacerbated by forward flexion, walking   Relieved by rest and sitting, Tramadol  Onset, traumatic event: no recent  Has tried:  Tramadol    Valsalva sign is  pos  Grocery cart sign is pos  Smoker:  yes, 3-4 daily   Does wake them at night  Litigation: none     Patient denies bowel/bladder incontinence, denies fever, denies unintentional weight loss, denies clumsiness of hands, feet, or dropping things.  Denies any constitutional or myelopathic symptomatology.      PREVIOUS TREATMENTS  IN THE LAST SIX MONTHS     Active conservative therapy  in the last six months (see below)              1. Physical therapy: no                                                                                    2. Home exercise program after PT: no                                                     3. A physician supervised home exercise program (HEP):  no               4. Chiropractic Care: no                                                                     Passive conservative therapy  in the last six months (see below)              1. NSAIDS:  celebrex                                                                                                   2. Prescription pain medication:  oral steroid taper, Tramadol                                                            3. Acupuncture: no                                                                       4. Tens unit: no     Assistive Devices: none    Work status:  retired       ROS: Other than listed in HPI, PMHX below, and intake paperwork including a 30 point patient-recorded review of symptoms which was personally reviewed and inclusive of no history of unintentional weight loss, change in appetite, significant malaise, fevers, chills, or change in bowel/bladder, shortness of breath, or chest pain.    I have confirmed and edited as necessary Past Medical, Past Surgical, Family, Social History and ROS as obtained by others. These were also obtained on new patient forms.      PHYSICAL EXAM:   GENERAL APPEARANCE:  Well nourished, well developed, and no apparent distress.  NEURO PSYCH: Patient oriented to person, place, Mood  pleasant. Benign affect.  MUSCULOSKELETAL and NEUROLOGICAL       VISUAL INSPECTION           LUMBAR: WNL  SPINE ROM:   LUMBAR ROM: Limited diffusely      PALPATION:           SPINOUS PROCESS: Nontender midline           PARASPINALS: Mild tenderness bilateral lower lumbar  FACET LOADING: Mildly positive bilateral lower lumbar  MUSCLE BULK: Normal and symmetrical in the upper & lower extremities.  MUSCLE TONE: Normal  MOTOR: 5/5 in all muscle groups tested in bilateral lower extremities   SENSORY: Normal sensory exam to light touch in the lower extremities  GAIT: Flexed forward posture, able to go up on heels and toes with no significant weakness.  No significant balance deficit appreciated  +30-second extension testing    DATA REVIEW:   The below imaging studies were personally reviewed and discussed with the patient.    Medical Decision Making:  The above note constitutes a Moderate to High level of medical decision making based on past data and imaging review, new and chronic symptoms with exacerbation, change in weakness or sensation, new imaging and diagnostic studies ordered, discussion of potential interventional or surgical treatment options, acute or chronic pain that may pose a threat to bodily function.    Past Medical History:   Diagnosis Date    Anxiety     Anxiety disorder, unspecified     Anxiety    Calculus of kidney     Bilateral kidney stones    Cataracts, bilateral     Chronic kidney disease     stage 3    COPD (chronic obstructive pulmonary disease) (Multi)     Coronary artery disease     Hypertension     Lung cancer (Multi)     Major depressive disorder, recurrent, in partial remission (CMS-[facility]) 09/21/2021    Recurrent major depressive disorder, in partial remission    Nephrolithiasis     Old myocardial infarction     History of myocardial infarction    Other conditions influencing health status     Coronary Artery Disease    Other transient cerebral ischemic attacks and related syndromes      Subclavian steal syndrome    Personal history of other diseases of the circulatory system     History of hypertension    Personal history of other malignant neoplasm of bronchus and lung     Personal history of malignant neoplasm of lung    Personal history of other specified conditions 01/10/2019    History of abdominal pain    Skin cancer     squamous cell carcinoma       Medication Documentation Review Audit       Reviewed by Sohail Guzman MD (Physician) on 11/21/24 at 0954      Medication Order Taking? Sig Documenting Provider Last Dose Status   acetaminophen (TYLENOL ORAL) [id number] Yes Take 1 tablet by mouth once daily as needed. Historical Provider, MD  Active   albuterol 90 mcg/actuation inhaler [id number] Yes INHALE 2 PUFFS BY MOUTH AS INSTRUCTED EVERY 4 HOURS AS NEEDED FOR WHEEZING / SHORTNESS OF BREATH Abram Salmeron DO  Active   ALPRAZolam (Xanax) 0.5 mg tablet [id number] Yes Take 1 tablet (0.5 mg) by mouth 3 times a day as needed for anxiety. Abram Salmeron DO  Active   ALPRAZolam (Xanax) 0.5 mg tablet [id number]  Take 1 tablet (0.5 mg) by mouth 3 times a day as needed for anxiety. Abram Salmeron DO  Active   amLODIPine (Norvasc) 5 mg tablet [id number] Yes Take 1 tablet (5 mg) by mouth once daily. Abram Salmeron DO  Active   ammonium lactate (Lac-Hydrin) 12 % lotion [id number] Yes Apply topically if needed for dry skin. Aimee Ochoa MD  Active   Anoro Ellipta 62.5-25 mcg/actuation blister with device [id number] Yes INHALE 1 PUFF BY MOUTH AS INSTRUCTED ONCE DAILY. Abram Salmeron DO  Active   aspirin 81 mg EC tablet [id number] Yes Take 1 tablet (81 mg) by mouth once daily. Historical Provider, MD  Active   atorvastatin (Lipitor) 80 mg tablet [id number] Yes Take 1 tablet (80 mg) by mouth once daily. Abram Salmeron DO  Active   benzonatate (Tessalon) 200 mg capsule [id number] Yes Take 1 capsule (200 mg) by mouth 3 times a day as needed for cough. Do not crush or chew. Abram MEYERS  Jaron DO  Active   carvedilol (Coreg) 25 mg tablet [id number] Yes Take 1 tablet (25 mg) by mouth 2 times daily (morning and late afternoon). Abram Salmeron DO  Active   celecoxib (CeleBREX) 200 mg capsule [id number] Yes Take 1 capsule (200 mg) by mouth once daily as needed for moderate pain (4 - 6). Abram Salmeron DO  Active   cetirizine HCl (ZYRTEC ORAL) [id number] Yes Take 1 capsule by mouth once daily as needed. Historical Provider, MD  Active   cholecalciferol (Vitamin D-3) 50 MCG ( UT) tablet [id number] Yes Take by mouth. Historical Provider, MD  Active   furosemide (Lasix) 20 mg tablet [id number] Yes Take 1 tablet (20 mg) by mouth once daily. Abram Salmeron DO  Active   Gemtesa 75 mg tablet [id number]  TAKE ONE TABLET BY MOUTH DAILY   Patient not taking: Reported on 2024    Kelli Linton MD [facility]  Active   hydrocortisone 2.5 % cream [id number] Yes Apply topically 2 times a day as needed for irritation or rash. Abram Salmeron DO  Active   lisinopril 20 mg tablet [id number]  Take 1 tablet (20 mg) by mouth once daily.   Patient not taking: Reported on 2024    Abram Salmeron DO  Active   magnesium oxide (Mag-Ox) 400 mg tablet [id number]  Take 1 tablet (400 mg) by mouth once daily.   Patient not taking: Reported on 2024    Abram Salmeron DO  Active   methylPREDNISolone (Medrol Dospak) 4 mg tablets [id number] Yes Take as directed on package. Abram Salmeron DO  Active   nitroglycerin (Nitrostat) 0.4 mg SL tablet [id number] Yes Place 1 tablet (0.4 mg) under the tongue every 5 minutes if needed (angina). Times three PRN bAram Salmeron DO  Active   oxygen (O2) gas therapy [id number] Yes at bedtime Historical Provider, MD  Active   traMADol (Ultram) 50 mg tablet [id number]  Take 1 tablet (50 mg) by mouth every 8 hours if needed for severe pain (7 - 10) for up to 20 days. Abram Salmeron DO   24 1658   venlafaxine XR (Effexor-XR) 37.5 mg 24 hr capsule  [id number] Yes Take 1 capsule (37.5 mg) by mouth once daily. Do not crush or chew. Abram S Holtzmeier, DO  Active                    Allergies   Allergen Reactions    Morphine Unknown     agitation    Myrbetriq [Mirabegron] Other     Elevated blood pressure.    Oxycodone-Acetaminophen Unknown     agitation    Sulfa (Sulfonamide Antibiotics) Unknown       Social History     Socioeconomic History    Marital status:      Spouse name: Not on file    Number of children: Not on file    Years of education: Not on file    Highest education level: Not on file   Occupational History    Not on file   Tobacco Use    Smoking status: Every Day     Current packs/day: 0.50     Average packs/day: 0.5 packs/day for 52.9 years (26.4 ttl pk-yrs)     Types: Cigarettes     Start date: 1972    Smokeless tobacco: Never   Vaping Use    Vaping status: Never Used   Substance and Sexual Activity    Alcohol use: Not Currently    Drug use: Not Currently    Sexual activity: Not on file   Other Topics Concern    Not on file   Social History Narrative    Not on file     Social Drivers of Health     Financial Resource Strain: Low Risk  (8/31/2024)    Overall Financial Resource Strain (CARDIA)     Difficulty of Paying Living Expenses: Not hard at all   Food Insecurity: Not on file   Transportation Needs: No Transportation Needs (8/31/2024)    PRAPARE - Transportation     Lack of Transportation (Medical): No     Lack of Transportation (Non-Medical): No   Physical Activity: Not on file   Stress: Not on file   Social Connections: Not on file   Intimate Partner Violence: Not on file   Housing Stability: Low Risk  (8/31/2024)    Housing Stability Vital Sign     Unable to Pay for Housing in the Last Year: No     Number of Times Moved in the Last Year: 1     Homeless in the Last Year: No       Past Surgical History:   Procedure Laterality Date    CT ANGIO NECK  11/26/2021    CT NECK ANGIO W AND WO IV CONTRAST 11/26/2021 [facility] CLINICAL LEGACY    CT  ANGIO NECK  11/23/2022    CT NECK ANGIO W AND WO IV CONTRAST 11/23/2022 [facility] ANCILLARY LEGACY    CT HEAD ANGIO W AND WO IV CONTRAST  11/26/2021    CT HEAD ANGIO W AND WO IV CONTRAST 11/26/2021 [facility] CLINICAL LEGACY    LUNG LOBECTOMY  04/22/2014    Lung Lobectomy    OTHER SURGICAL HISTORY  08/20/2013    Cath Stent 1 Type Drug-Eluting    OTHER SURGICAL HISTORY  08/20/2013    Cardiac Cath Procedure Outcome: Successful    OTHER SURGICAL HISTORY  04/22/2014    Destruction Of Malignant Lesion

## 2024-11-22 ENCOUNTER — TELEPHONE (OUTPATIENT)
Dept: PRIMARY CARE | Facility: CLINIC | Age: 74
End: 2024-11-22
Payer: MEDICARE

## 2024-11-22 DIAGNOSIS — J20.9 ACUTE BRONCHITIS, UNSPECIFIED ORGANISM: ICD-10-CM

## 2024-11-22 RX ORDER — BENZONATATE 200 MG/1
200 CAPSULE ORAL 3 TIMES DAILY PRN
Qty: 21 CAPSULE | Refills: 0 | Status: SHIPPED | OUTPATIENT
Start: 2024-11-22 | End: 2024-12-22

## 2024-11-22 NOTE — TELEPHONE ENCOUNTER
Pt is calling stating that her cough has improved but she still has a cough. Pt is asking for another refill on her benzonatate?    REFILL  MEDICATION:     Benzonatate 200 MG; Take 1 capsule 3 times a day as needed for cough.    PHARM: [facility]   PHARM NUMBER: [phone]    LR: 11/15/24     21 capsules with 0 refills   LV: 11/15/24  NV: No future appt.

## 2024-12-02 ENCOUNTER — TELEPHONE (OUTPATIENT)
Dept: PRIMARY CARE | Facility: CLINIC | Age: 74
End: 2024-12-02
Payer: MEDICARE

## 2024-12-02 DIAGNOSIS — M54.50 CHRONIC RIGHT-SIDED LOW BACK PAIN, UNSPECIFIED WHETHER SCIATICA PRESENT: ICD-10-CM

## 2024-12-02 DIAGNOSIS — G89.29 CHRONIC RIGHT-SIDED LOW BACK PAIN, UNSPECIFIED WHETHER SCIATICA PRESENT: ICD-10-CM

## 2024-12-02 RX ORDER — TRAMADOL HYDROCHLORIDE 50 MG/1
TABLET ORAL
COMMUNITY
End: 2024-12-02 | Stop reason: SDUPTHER

## 2024-12-02 RX ORDER — TRAMADOL HYDROCHLORIDE 50 MG/1
50 TABLET ORAL EVERY 8 HOURS PRN
Qty: 10 TABLET | Refills: 0 | Status: SHIPPED | OUTPATIENT
Start: 2024-12-02

## 2024-12-02 NOTE — TELEPHONE ENCOUNTER
Pt requesting refill- she is requesting 10 tablets to get her through until her never block on 12/13/24    Tramadol 50mg take 1 tab by mouth every 8 hours if needed for severe pain (7-10) for up to 20 days   [phone]  Last refill 10/25/24  Last appt 11/15/24  No future appt has been scheduled

## 2024-12-04 ENCOUNTER — TELEPHONE (OUTPATIENT)
Dept: GYNECOLOGIC ONCOLOGY | Facility: HOSPITAL | Age: 74
End: 2024-12-04
Payer: MEDICARE

## 2024-12-04 ENCOUNTER — OFFICE VISIT (OUTPATIENT)
Dept: GYNECOLOGIC ONCOLOGY | Facility: CLINIC | Age: 74
End: 2024-12-04
Payer: MEDICARE

## 2024-12-04 VITALS
DIASTOLIC BLOOD PRESSURE: 67 MMHG | SYSTOLIC BLOOD PRESSURE: 150 MMHG | BODY MASS INDEX: 19.7 KG/M2 | WEIGHT: 118.39 LBS | OXYGEN SATURATION: 95 % | HEART RATE: 78 BPM | RESPIRATION RATE: 16 BRPM | TEMPERATURE: 97.5 F

## 2024-12-04 DIAGNOSIS — N90.3 VULVAR DYSPLASIA: ICD-10-CM

## 2024-12-04 DIAGNOSIS — N76.4 VULVAR ABSCESS: Primary | ICD-10-CM

## 2024-12-04 DIAGNOSIS — N32.81 OAB (OVERACTIVE BLADDER): ICD-10-CM

## 2024-12-04 PROCEDURE — 99214 OFFICE O/P EST MOD 30 MIN: CPT | Performed by: NURSE PRACTITIONER

## 2024-12-04 PROCEDURE — 3077F SYST BP >= 140 MM HG: CPT | Performed by: NURSE PRACTITIONER

## 2024-12-04 PROCEDURE — 4004F PT TOBACCO SCREEN RCVD TLK: CPT | Performed by: NURSE PRACTITIONER

## 2024-12-04 PROCEDURE — 1159F MED LIST DOCD IN RCRD: CPT | Performed by: NURSE PRACTITIONER

## 2024-12-04 PROCEDURE — 3078F DIAST BP <80 MM HG: CPT | Performed by: NURSE PRACTITIONER

## 2024-12-04 PROCEDURE — 1126F AMNT PAIN NOTED NONE PRSNT: CPT | Performed by: NURSE PRACTITIONER

## 2024-12-04 PROCEDURE — 1123F ACP DISCUSS/DSCN MKR DOCD: CPT | Performed by: NURSE PRACTITIONER

## 2024-12-04 RX ORDER — DOXYCYCLINE 100 MG/1
100 CAPSULE ORAL 2 TIMES DAILY
Qty: 14 CAPSULE | Refills: 0 | Status: SHIPPED | OUTPATIENT
Start: 2024-12-04 | End: 2024-12-11

## 2024-12-04 RX ORDER — OXYBUTYNIN CHLORIDE 10 MG/1
10 TABLET, EXTENDED RELEASE ORAL DAILY
Qty: 30 TABLET | Refills: 11 | Status: SHIPPED | OUTPATIENT
Start: 2024-12-04 | End: 2025-12-04

## 2024-12-04 ASSESSMENT — PAIN SCALES - GENERAL: PAINLEVEL_OUTOF10: 0-NO PAIN

## 2024-12-04 NOTE — PROGRESS NOTES
Patient ID: Edu Domingo is a 74 y.o. female.  Primary Care Provider: DO Oneil Aviles    Referred by Dr. 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    21 R vulvar biopsy:  DAMIEN-3 with HPV cytopathic effect.    1/2 PPD smoker x 30 years     PMH:  Hypertension, overactive bladder, hypercholesterolemia, anxiety. History of lung cancer in  (underwent LL lobectomy followed by chemo)- per patient they did not think lung was the primary. History of skin cancer on legs.    PSH: Left lower lobectomy , right arm ganglion cyst removal, lesions excised on legs for skin cancer     Family history: Mother with rectal cancer, father with pancreatic cancer, maternal aunt breast, maternal aunt cervix cancer    ,  x2. No abnormal paps last in . Menopause in .       Objective    Visit Vitals  /67 (BP Location: Right arm, Patient Position: Sitting, BP Cuff Size: Adult)   Pulse 78   Temp 36.4 °C (97.5 °F) (Temporal)   Resp 16          Interval history: Patient is a 74 year old female s/p R wide excision of anterior vulvar/thigh lesion, R wide excision of perianal lesion, and R thigh punch biopsy on 2021 for VIN3.      Nursing Note- called office with complaints.    Patient called to report left labial lump. She noticed lump 1 week ago. Lump has increased in size and started draining purulent drainage today. + Pain with movement. Denies fever. Message routed to Nicky Rodriguez CNP     Patient states she noticed a week ago a lump on her left labia, has continued to get bigger.  She states now bothersome when walking.  She states she has some purulent drainage today.  Patient's  had heart attack since last seen.  She has not been sexually active.  She denies any bowel issues.  She stopped taking her Myrbetriq due to not being able to afford her  medication and her own.   She is having worsening OAB.   Patient seeing dermatology  on Friday for skin cancer check.   Patient using Lume on her vulvar skin and groins.    Physical Exam:    Constitutional: Doing well. ROSANA  Eyes: PERRL  ENMT: Moist mucus membranes  Head/Neck: Supple. Symmetrical  Cardiovascular: Regular, rate and rhythm. 2+ equal pulses of the extremities  Respiratory/Thorax: CTA. RRR. Chest rise symmetrical.  Gastrointestinal: Non-distended, soft, non-tender  Genitourinary:   Cervical os visualized with no lesions, no CMT, small mobile uterus, no adnexal masses noted. Smooth vaginal walls.   Well healed incision on right.   Left vulva with round indurated area 2.5cm, purulent drainage with palpation, no surrounding erythema.    Musculoskeletal: ROM intact, no joint swelling, normal strength  Extremities: No edema  Neurological: Alert and oriented x 3. Pleasant and cooperative.  Lymphatic: No lymphadenopathy. No lymphedema  Psychological: Appropriate mood and behavior  Skin: Warm and dry, no lesions, no rashes    A complete review of systems was performed and all systems were normal except what is noted in the interval history.          Assessment/Plan  Patient is a 74 year old female s/p R wide excision of anterior vulvar/thigh lesion, R wide excision of perianal lesion, and R thigh punch biopsy on 6/2/2021 for VIN3. ROSANA 3.5 years.  Vulvar abscess.  OAB.      Plan:   RX for Oxybutynin 10 mg sent  RX for Doxycyline 100 mg bid   F/U in 2 weeks for vulvar check

## 2024-12-04 NOTE — TELEPHONE ENCOUNTER
Patient called to report left labial lump.  She noticed lump 1 week ago.    Lump has increased in size and started draining purulent drainage today.    + Pain with movement.   Denies fever.      Message routed to Nicky Rodriguez, CNP    [phone]  Phoned patient to notify that she is scheduled to see Nicky at 1:20 today at the [facility] location.

## 2024-12-06 ENCOUNTER — APPOINTMENT (OUTPATIENT)
Dept: DERMATOLOGY | Facility: CLINIC | Age: 74
End: 2024-12-06
Payer: MEDICARE

## 2024-12-13 ENCOUNTER — HOSPITAL ENCOUNTER (OUTPATIENT)
Dept: OPERATING ROOM | Facility: CLINIC | Age: 74
Discharge: HOME | End: 2024-12-13
Payer: MEDICARE

## 2024-12-13 VITALS
SYSTOLIC BLOOD PRESSURE: 139 MMHG | BODY MASS INDEX: 19.25 KG/M2 | DIASTOLIC BLOOD PRESSURE: 63 MMHG | HEIGHT: 65 IN | TEMPERATURE: 97 F | HEART RATE: 77 BPM | WEIGHT: 115.52 LBS | RESPIRATION RATE: 18 BRPM | OXYGEN SATURATION: 99 %

## 2024-12-13 DIAGNOSIS — M48.062 LUMBAR STENOSIS WITH NEUROGENIC CLAUDICATION: ICD-10-CM

## 2024-12-13 PROCEDURE — 2500000004 HC RX 250 GENERAL PHARMACY W/ HCPCS (ALT 636 FOR OP/ED): Performed by: PHYSICAL MEDICINE & REHABILITATION

## 2024-12-13 PROCEDURE — 7100000009 HC PHASE TWO TIME - INITIAL BASE CHARGE

## 2024-12-13 PROCEDURE — 3600000006 HC OR TIME - EACH INCREMENTAL 1 MINUTE - PROCEDURE LEVEL ONE

## 2024-12-13 PROCEDURE — 3600000001 HC OR TIME - INITIAL BASE CHARGE - PROCEDURE LEVEL ONE

## 2024-12-13 PROCEDURE — 7100000010 HC PHASE TWO TIME - EACH INCREMENTAL 1 MINUTE

## 2024-12-13 PROCEDURE — 2550000001 HC RX 255 CONTRASTS: Performed by: PHYSICAL MEDICINE & REHABILITATION

## 2024-12-13 PROCEDURE — 62323 NJX INTERLAMINAR LMBR/SAC: CPT | Performed by: PHYSICAL MEDICINE & REHABILITATION

## 2024-12-13 RX ORDER — MIDAZOLAM HYDROCHLORIDE 1 MG/ML
INJECTION, SOLUTION INTRAMUSCULAR; INTRAVENOUS AS NEEDED
Status: COMPLETED | OUTPATIENT
Start: 2024-12-13 | End: 2024-12-13

## 2024-12-13 RX ORDER — SODIUM BICARBONATE 42 MG/ML
INJECTION, SOLUTION INTRAVENOUS AS NEEDED
Status: COMPLETED | OUTPATIENT
Start: 2024-12-13 | End: 2024-12-13

## 2024-12-13 RX ORDER — DEXAMETHASONE SODIUM PHOSPHATE 10 MG/ML
INJECTION INTRAMUSCULAR; INTRAVENOUS AS NEEDED
Status: COMPLETED | OUTPATIENT
Start: 2024-12-13 | End: 2024-12-13

## 2024-12-13 RX ORDER — LIDOCAINE HYDROCHLORIDE 10 MG/ML
INJECTION, SOLUTION EPIDURAL; INFILTRATION; INTRACAUDAL; PERINEURAL AS NEEDED
Status: COMPLETED | OUTPATIENT
Start: 2024-12-13 | End: 2024-12-13

## 2024-12-13 ASSESSMENT — ENCOUNTER SYMPTOMS
NAUSEA: 0
BACK PAIN: 1
CHEST TIGHTNESS: 0
SHORTNESS OF BREATH: 0
FEVER: 0
DEPRESSION: 0
CHILLS: 0
APNEA: 0
OCCASIONAL FEELINGS OF UNSTEADINESS: 0
ARTHRALGIAS: 1
ABDOMINAL PAIN: 0
AGITATION: 0
LOSS OF SENSATION IN FEET: 0

## 2024-12-13 ASSESSMENT — PAIN SCALES - GENERAL
PAINLEVEL_OUTOF10: 9
PAINLEVEL_OUTOF10: 0 - NO PAIN
PAINLEVEL_OUTOF10: 0 - NO PAIN

## 2024-12-13 ASSESSMENT — PAIN - FUNCTIONAL ASSESSMENT
PAIN_FUNCTIONAL_ASSESSMENT: 0-10
PAIN_FUNCTIONAL_ASSESSMENT: 0-10

## 2024-12-13 NOTE — DISCHARGE INSTRUCTIONS
"Post Procedure Instructions     1. You MUST have a  to take you home and be available to assist you at home if needed, unless specifically arranged before procedure.     2. Get up from your seated or lying position slowly and carefully. It is not unusual to have some \"numbness\" in your back or legs following a lumbar injection. After a cervical injection it is not abnormal to have arm or neck numbness. The symptoms (if they occur) may last a few hours, but will wear off. Have someone assist you as you walk if numbness in your legs occurs.     3. Complete sensory block is the intent of an injection to nerves. Occasionally in trying to achieve sensory blockade you also receive a motor blockade (weak arm or leg) 4. If you receive sedation, do not drive a motorized vehicle for 24 hours.     5. Avoid ALL alcoholic beverages the day of your procedure.     6. Discuss sleep and pain medications with your prescribing physician if you received sedation.     7. It is safe to resume medications once home.     8. Tomorrow you may resume regular activities to the level your pain will tolerate. The exception is no lifting over 20 pounds for 36 hours. You will likely experience a temporary exacerbation of pre-injection pain when anesthetic medication wears off.     9. Remove your band aid tomorrow.     10. Do not take a tub bath or submerge in water for 48 hours. You may shower.     11. Apply ice to your injection site if it is swollen or hurts after the injection. Only apply 20 minutes on, then off for 20 minutes. Take the ice off as you sleep.     12. Call scheduling office at [phone] to verify a follow-up appointment 4-6 weeks after your procedure / injection with your pain diary.     13. Call your physician immediately or go to the emergency room for any of the following symptoms:    Severe pain at your injection site (tightness or aching is normal)    Pain, redness, pus, or leaking fluid at the injection site    " "Prolonged or increasing numbness 14 hours after a block    A temperature over 101.5 degrees F and / or chills    Excessive bruising, bleeding, or swelling at the injection site    Loss of bowel or bladder control or \"saddle anesthesia\"    Inability to speak, facial droop, and / or limb paralysis     Note: if you have any questions please contact our office at [phone], if the office is closed please call the after hours phone number at [phone] and ask for the pain resident on call    LETI Guzman M.D.  Medicine and Rehabilitation, [facility]     To schedule FOLLOW-UP appointments, please use the call center at [phone].   To schedule future PROCEDURES or for questions for the doctor please call [phone].  "

## 2024-12-13 NOTE — H&P
History Of Present Illness  Edu Domingo is a 74 y.o. female presenting with chronic lower back and leg pain refractory to conservative treatment, achy and burning, worse with activity, better at rest, trying to avoid surgery, pain can be a 6-8/10.     Past Medical History  Past Medical History:   Diagnosis Date    Anxiety     Anxiety disorder, unspecified     Anxiety    Calculus of kidney     Bilateral kidney stones    Cataracts, bilateral     Chronic kidney disease     stage 3    COPD (chronic obstructive pulmonary disease) (Multi)     Coronary artery disease     Hypertension     Lung cancer (Multi)     Major depressive disorder, recurrent, in partial remission (CMS-[facility]) 09/21/2021    Recurrent major depressive disorder, in partial remission    Nephrolithiasis     Old myocardial infarction     History of myocardial infarction    Other conditions influencing health status     Coronary Artery Disease    Other transient cerebral ischemic attacks and related syndromes     Subclavian steal syndrome    Personal history of other diseases of the circulatory system     History of hypertension    Personal history of other malignant neoplasm of bronchus and lung     Personal history of malignant neoplasm of lung    Personal history of other specified conditions 01/10/2019    History of abdominal pain    Skin cancer     squamous cell carcinoma       Surgical History  Past Surgical History:   Procedure Laterality Date    CT ANGIO NECK  11/26/2021    CT NECK ANGIO W AND WO IV CONTRAST 11/26/2021 [facility]ICAL [facility]GAC[facility]    CT ANGIO NECK  11/23/2022    CT NECK ANGIO W AND WO IV CONTRAST 11/23/2022 [facility] LEGAC[facility]    CT HEAD ANGIO W AND WO IV CONTRAST  11/26/2021    CT HEAD ANGIO W AND WO IV CONTRAST 11/26/2021 [facility] CLINICAL LEGACY    LUNG LOBECTOMY  04/22/2014    Lung Lobectomy    OTHER SURGICAL HISTORY  08/20/2013    Cath Stent 1 Type Drug-Eluting    OTHER SURGICAL HISTORY  08/20/2013    Cardiac Cath Procedure Outcome:  Successful    OTHER SURGICAL HISTORY  04/22/2014    Destruction Of Malignant Lesion        Social History  She reports that she has been smoking cigarettes. She started smoking about 52 years ago. She has a 26.5 pack-year smoking history. She has never used smokeless tobacco. She reports that she does not currently use alcohol. She reports that she does not currently use drugs.    Family History  Family History   Problem Relation Name Age of Onset    Heart disease Mother      Rectal cancer Mother      Other (cardiac disorder) Mother      Breast cancer Mother  30    Prostate cancer Father      Diabetes Maternal Grandmother      Diabetes Maternal Grandfather      Ovarian cancer Mother's Sister      Breast cancer Mother's Sister      Cancer Cousin          Allergies  Morphine, Myrbetriq [mirabegron], Oxycodone-acetaminophen, and Sulfa (sulfonamide antibiotics)    Review of Systems   Constitutional:  Negative for chills and fever.   Respiratory:  Negative for apnea, chest tightness and shortness of breath.    Cardiovascular:  Negative for chest pain.   Gastrointestinal:  Negative for abdominal pain and nausea.   Musculoskeletal:  Positive for arthralgias and back pain.   Psychiatric/Behavioral:  Negative for agitation.    All other systems reviewed and are negative.       Physical Exam  Vitals reviewed.   Constitutional:       Appearance: Normal appearance.   HENT:      Head: Atraumatic.   Pulmonary:      Effort: Pulmonary effort is normal.      Breath sounds: Normal breath sounds.   Neurological:      Mental Status: She is alert and oriented to person, place, and time. Mental status is at baseline.   Psychiatric:         Mood and Affect: Mood normal.         Behavior: Behavior is cooperative.          Last Recorded Vitals  There were no vitals taken for this visit.    Relevant Results           Assessment/Plan   Assessment & Plan  Lumbar stenosis with neurogenic claudication    L5/S1 interlaminar RL      Antimo P  MD Megan

## 2024-12-13 NOTE — PROGRESS NOTES
Patient ID: Edu Domingo is a 74 y.o. female.  Primary Care Provider: DO Oneil Aviles    Referred by Dr. 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    21 R vulvar biopsy:  DAMIEN-3 with HPV cytopathic effect.    / PPD smoker x 30 years     PMH:  Hypertension, overactive bladder, hypercholesterolemia, anxiety. History of lung cancer in  (underwent LL lobectomy followed by chemo)- per patient they did not think lung was the primary. History of skin cancer on legs.    PSH: Left lower lobectomy , right arm ganglion cyst removal, lesions excised on legs for skin cancer     Family history: Mother with rectal cancer, father with pancreatic cancer, maternal aunt breast, maternal aunt cervix cancer    ,  x2. No abnormal paps last in . Menopause in .       Objective    Visit Vitals  /64 (BP Location: Right arm, Patient Position: Sitting, BP Cuff Size: Adult)   Pulse 72   Temp 36.1 °C (97 °F) (Temporal)   Resp 16       Interval history: Patient is a 74 year old female s/p R wide excision of anterior vulvar/thigh lesion, R wide excision of perianal lesion, and R thigh punch biopsy on 2021 for VIN3.  Seen two weeks ago with vulvar abscess.  Here for wound check.  Patient states she took antibiotics, area improved within 3 days.  Denies any further drainage or bleeding or pain.  Patient states area is still firm but improved.      Physical Exam:    Constitutional: Doing well.   Eyes: PERRL  ENMT: Moist mucus membranes  Head/Neck: Supple. Symmetrical  Genitourinary: Well healed vulvar abscess on left vulva, induration improved, no erythema noted.   Musculoskeletal: ROM intact, no joint swelling, normal strength  Extremities: No edema  Neurological: Alert and oriented x 3. Pleasant and cooperative.  Psychological: Appropriate mood and behavior  Skin: Warm and dry, no lesions, no rashes    A complete review of systems was performed  and all systems were normal except what is noted in the interval history.        Assessment/Plan  Patient is a 74 year old female s/p R wide excision of anterior vulvar/thigh lesion, R wide excision of perianal lesion, and R thigh punch biopsy on 6/2/2021 for VIN3. Healed vulvar abscess.     Plan: F/U in 6 months or as needed

## 2024-12-16 ENCOUNTER — TELEPHONE (OUTPATIENT)
Dept: PRIMARY CARE | Facility: CLINIC | Age: 74
End: 2024-12-16
Payer: MEDICARE

## 2024-12-16 DIAGNOSIS — I10 PRIMARY HYPERTENSION: ICD-10-CM

## 2024-12-16 DIAGNOSIS — Z98.61 CAD S/P PERCUTANEOUS CORONARY ANGIOPLASTY: ICD-10-CM

## 2024-12-16 DIAGNOSIS — F32.0 DEPRESSION, MAJOR, SINGLE EPISODE, MILD (CMS-HCC): ICD-10-CM

## 2024-12-16 DIAGNOSIS — I25.10 CAD S/P PERCUTANEOUS CORONARY ANGIOPLASTY: ICD-10-CM

## 2024-12-16 DIAGNOSIS — M19.91 PRIMARY OSTEOARTHRITIS, UNSPECIFIED SITE: ICD-10-CM

## 2024-12-16 RX ORDER — ATORVASTATIN CALCIUM 80 MG/1
80 TABLET, FILM COATED ORAL DAILY
Qty: 90 TABLET | Refills: 1 | Status: SHIPPED | OUTPATIENT
Start: 2024-12-16

## 2024-12-16 RX ORDER — AMLODIPINE BESYLATE 5 MG/1
5 TABLET ORAL DAILY
Qty: 30 TABLET | Refills: 2 | Status: SHIPPED | OUTPATIENT
Start: 2024-12-16 | End: 2025-06-14

## 2024-12-16 RX ORDER — CELECOXIB 200 MG/1
200 CAPSULE ORAL DAILY PRN
Qty: 90 CAPSULE | Refills: 1 | Status: SHIPPED | OUTPATIENT
Start: 2024-12-16

## 2024-12-16 RX ORDER — ALPRAZOLAM 0.5 MG/1
0.5 TABLET ORAL 3 TIMES DAILY PRN
Qty: 90 TABLET | Refills: 0 | Status: SHIPPED | OUTPATIENT
Start: 2024-12-16

## 2024-12-16 RX ORDER — VENLAFAXINE HYDROCHLORIDE 37.5 MG/1
37.5 CAPSULE, EXTENDED RELEASE ORAL DAILY
Qty: 90 CAPSULE | Refills: 0 | Status: SHIPPED | OUTPATIENT
Start: 2024-12-16

## 2024-12-16 NOTE — TELEPHONE ENCOUNTER
REFILL  MEDICATION:     Venlafaxine XR 37.5 MG; Take 1 capsule daily.     LR: 7/23/24     90 capsules with 0 refills     Alprazolam 0.5 MG; Take 1 tablet 3 times a day as needed for anxiety.    LR: 9/16/24      90 tablets with 0 refills     Amlodipine 5 MG; Take 1 tablet daily.    LR: 10/31/24      30 tablets with 2 refills     Atorvastatin 80 MG; Take 1 tablet daily.    LR: 7/23/24     90 tablets with 1 refill     Celecoxib 200 MG; Take 1 capsule once daily as needed for moderate pain.    LR: 6/10/24       90 capsules with 1 refill     PHARM: [facility]   PHARM NUMBER: [phone]    LV: 9/23/24  NV: 12/23/24

## 2024-12-18 ENCOUNTER — OFFICE VISIT (OUTPATIENT)
Dept: GYNECOLOGIC ONCOLOGY | Facility: CLINIC | Age: 74
End: 2024-12-18
Payer: MEDICARE

## 2024-12-18 ENCOUNTER — TELEPHONE (OUTPATIENT)
Dept: ORTHOPEDIC SURGERY | Facility: CLINIC | Age: 74
End: 2024-12-18

## 2024-12-18 VITALS
BODY MASS INDEX: 20.07 KG/M2 | SYSTOLIC BLOOD PRESSURE: 143 MMHG | WEIGHT: 120.59 LBS | DIASTOLIC BLOOD PRESSURE: 64 MMHG | HEART RATE: 72 BPM | TEMPERATURE: 97 F | RESPIRATION RATE: 16 BRPM | OXYGEN SATURATION: 94 %

## 2024-12-18 DIAGNOSIS — N76.4 VULVAR ABSCESS: ICD-10-CM

## 2024-12-18 DIAGNOSIS — N90.3 VULVAR DYSPLASIA: Primary | ICD-10-CM

## 2024-12-18 PROCEDURE — 4004F PT TOBACCO SCREEN RCVD TLK: CPT | Performed by: NURSE PRACTITIONER

## 2024-12-18 PROCEDURE — 3078F DIAST BP <80 MM HG: CPT | Performed by: NURSE PRACTITIONER

## 2024-12-18 PROCEDURE — 3077F SYST BP >= 140 MM HG: CPT | Performed by: NURSE PRACTITIONER

## 2024-12-18 PROCEDURE — 1123F ACP DISCUSS/DSCN MKR DOCD: CPT | Performed by: NURSE PRACTITIONER

## 2024-12-18 PROCEDURE — 1159F MED LIST DOCD IN RCRD: CPT | Performed by: NURSE PRACTITIONER

## 2024-12-18 PROCEDURE — 99212 OFFICE O/P EST SF 10 MIN: CPT | Performed by: NURSE PRACTITIONER

## 2024-12-18 PROCEDURE — 1126F AMNT PAIN NOTED NONE PRSNT: CPT | Performed by: NURSE PRACTITIONER

## 2024-12-18 ASSESSMENT — PAIN SCALES - GENERAL: PAINLEVEL_OUTOF10: 0-NO PAIN

## 2024-12-18 NOTE — TELEPHONE ENCOUNTER
Patient called and is experiencing pain pain constant since she injection.  Low back pain and right leg pain.  Requesting Tramadol for pain medication now and is very angry that she cannot stand, sit, lie down without constant pain 20/10.  She states that she is does not take medications and not interested in a muscle relaxant, just Tramadol.  Appointment offered to see Dr. Acevedo, and patient declined, stating I just need some Tramadol to get me through this, or even a steroid.  Patient informed that Dr. Guzman will be notified of her concerns.

## 2024-12-19 DIAGNOSIS — M48.062 LUMBAR STENOSIS WITH NEUROGENIC CLAUDICATION: Primary | ICD-10-CM

## 2024-12-19 RX ORDER — METHYLPREDNISOLONE 4 MG/1
TABLET ORAL
Qty: 21 TABLET | Refills: 0 | Status: SHIPPED | OUTPATIENT
Start: 2024-12-19

## 2024-12-23 ENCOUNTER — APPOINTMENT (OUTPATIENT)
Dept: PRIMARY CARE | Facility: CLINIC | Age: 74
End: 2024-12-23
Payer: MEDICARE

## 2024-12-23 VITALS
SYSTOLIC BLOOD PRESSURE: 158 MMHG | TEMPERATURE: 97.5 F | DIASTOLIC BLOOD PRESSURE: 66 MMHG | WEIGHT: 116 LBS | BODY MASS INDEX: 19.3 KG/M2

## 2024-12-23 DIAGNOSIS — M54.50 CHRONIC RIGHT-SIDED LOW BACK PAIN, UNSPECIFIED WHETHER SCIATICA PRESENT: ICD-10-CM

## 2024-12-23 DIAGNOSIS — G89.29 CHRONIC RIGHT-SIDED LOW BACK PAIN, UNSPECIFIED WHETHER SCIATICA PRESENT: ICD-10-CM

## 2024-12-23 DIAGNOSIS — D33.4 SCHWANNOMA OF SPINAL CORD (MULTI): Primary | ICD-10-CM

## 2024-12-23 PROCEDURE — 1159F MED LIST DOCD IN RCRD: CPT | Performed by: FAMILY MEDICINE

## 2024-12-23 PROCEDURE — 99214 OFFICE O/P EST MOD 30 MIN: CPT | Performed by: FAMILY MEDICINE

## 2024-12-23 PROCEDURE — 3078F DIAST BP <80 MM HG: CPT | Performed by: FAMILY MEDICINE

## 2024-12-23 PROCEDURE — 3077F SYST BP >= 140 MM HG: CPT | Performed by: FAMILY MEDICINE

## 2024-12-23 PROCEDURE — 1160F RVW MEDS BY RX/DR IN RCRD: CPT | Performed by: FAMILY MEDICINE

## 2024-12-23 PROCEDURE — 1123F ACP DISCUSS/DSCN MKR DOCD: CPT | Performed by: FAMILY MEDICINE

## 2024-12-23 PROCEDURE — 4004F PT TOBACCO SCREEN RCVD TLK: CPT | Performed by: FAMILY MEDICINE

## 2024-12-23 NOTE — PATIENT INSTRUCTIONS
I recommend PT.  I recommend following up with Dr Guzman to consider medial branch blocks or radiofrequency ablation treatment.  You found low dose Lyrica to be too sedating.  I recommend that you follow up with Dr Guzman to determine the next step in the treatment of your chronic back pain.      Return in one or two months for a recheck.

## 2024-12-23 NOTE — PROGRESS NOTES
Subjective   Patient ID: [id number]     Edu Domingo is a 74 y.o. female who presents for Follow-up.  HPI    She is here for a recheck.      She remains on lisinopril, carvedilol, tramadol, alprazolam, Lyrica, venlafaxine, atorvastatin, albuterol, Anoro Ellipta, amlodipine, furosemide, magnesium, celecoxib, Gemtesa, aspirin, oxybutynin, vit d,       She has had a growth on imaging in the lumbar spine that was negative for uptake on PET scanning thought to be a schwannoma.      She has had injections from Dr Guzman.  Maybe  a little bit of help for a couple of days.      She still has a lot of back pain.      She started a medrol dose pack two days ago.  That has helped somewhat.     She has had CAD, COPD, breast cancer, vulvar cancer.         Objective   /66 (BP Location: Right arm, Patient Position: Sitting)   Temp 36.4 °C (97.5 °F) (Skin)   Wt 52.6 kg (116 lb)   LMP  (LMP Unknown)   BMI 19.30 kg/m²      Physical Exam  Constitutional:       Appearance: Normal appearance.   Cardiovascular:      Rate and Rhythm: Normal rate and regular rhythm.      Heart sounds: Normal heart sounds.   Pulmonary:      Effort: Pulmonary effort is normal.      Breath sounds: Normal breath sounds.   Musculoskeletal:      Comments: Tender at the lumbar paraspinals.         Neurological:      Mental Status: She is alert.         Assessment/Plan   Problem List Items Addressed This Visit    None  Visit Diagnoses       Schwannoma of spinal cord (Multi)    -  Primary    Chronic right-sided low back pain, unspecified whether sciatica present              I recommend PT.  I recommend following up with Dr Guzman to consider medial branch blocks or radiofrequency ablation treatment.  You found low dose Lyrica to be too sedating.  I recommend that you follow up with Dr Guzman to determine the next step in the treatment of your chronic back pain.      Return in one or two months for a recheck.    Abram Salmeron,

## 2025-01-09 ENCOUNTER — APPOINTMENT (OUTPATIENT)
Dept: CARDIOLOGY | Facility: CLINIC | Age: 75
End: 2025-01-09
Payer: MEDICARE

## 2025-01-14 ENCOUNTER — LAB (OUTPATIENT)
Dept: LAB | Facility: LAB | Age: 75
End: 2025-01-14
Payer: MEDICARE

## 2025-01-14 DIAGNOSIS — R94.31 ABNORMAL EKG: ICD-10-CM

## 2025-01-14 DIAGNOSIS — I21.3 ST ELEVATION MYOCARDIAL INFARCTION (STEMI), UNSPECIFIED ARTERY (MULTI): ICD-10-CM

## 2025-01-14 DIAGNOSIS — R42 DIZZINESS: ICD-10-CM

## 2025-01-14 DIAGNOSIS — C34.90 ADENOCARCINOMA OF LUNG, UNSPECIFIED LATERALITY (MULTI): ICD-10-CM

## 2025-01-14 DIAGNOSIS — I10 ESSENTIAL HYPERTENSION: ICD-10-CM

## 2025-01-14 DIAGNOSIS — R00.2 PALPITATIONS: ICD-10-CM

## 2025-01-14 DIAGNOSIS — R09.89 BRUIT OF LEFT CAROTID ARTERY: ICD-10-CM

## 2025-01-14 DIAGNOSIS — F17.200 NICOTINE USE DISORDER: ICD-10-CM

## 2025-01-14 DIAGNOSIS — R07.9 CHEST PAIN, UNSPECIFIED TYPE: ICD-10-CM

## 2025-01-14 DIAGNOSIS — K21.9 GASTROESOPHAGEAL REFLUX DISEASE WITHOUT ESOPHAGITIS: ICD-10-CM

## 2025-01-14 DIAGNOSIS — I10 PRIMARY HYPERTENSION: ICD-10-CM

## 2025-01-14 DIAGNOSIS — I25.10 CAD S/P PERCUTANEOUS CORONARY ANGIOPLASTY: ICD-10-CM

## 2025-01-14 DIAGNOSIS — E78.2 MIXED HYPERLIPIDEMIA: ICD-10-CM

## 2025-01-14 DIAGNOSIS — I65.21 STENOSIS OF RIGHT CAROTID ARTERY: ICD-10-CM

## 2025-01-14 DIAGNOSIS — G45.8 SUBCLAVIAN STEAL SYNDROME: ICD-10-CM

## 2025-01-14 DIAGNOSIS — I25.5 CARDIOMYOPATHY, ISCHEMIC: ICD-10-CM

## 2025-01-14 DIAGNOSIS — F41.9 ANXIETY: ICD-10-CM

## 2025-01-14 DIAGNOSIS — F32.A DEPRESSION, UNSPECIFIED DEPRESSION TYPE: ICD-10-CM

## 2025-01-14 DIAGNOSIS — Z98.61 CAD S/P PERCUTANEOUS CORONARY ANGIOPLASTY: ICD-10-CM

## 2025-01-14 DIAGNOSIS — R09.89 BILATERAL CAROTID BRUITS: ICD-10-CM

## 2025-01-14 DIAGNOSIS — I47.29 NON-SUSTAINED VENTRICULAR TACHYCARDIA (MULTI): ICD-10-CM

## 2025-01-14 DIAGNOSIS — R55 SYNCOPE, UNSPECIFIED SYNCOPE TYPE: ICD-10-CM

## 2025-01-14 LAB
ALBUMIN SERPL BCP-MCNC: 4.2 G/DL (ref 3.4–5)
ALP SERPL-CCNC: 81 U/L (ref 33–136)
ALT SERPL W P-5'-P-CCNC: 16 U/L (ref 7–45)
ANION GAP SERPL CALC-SCNC: 14 MMOL/L (ref 10–20)
AST SERPL W P-5'-P-CCNC: 19 U/L (ref 9–39)
BILIRUB DIRECT SERPL-MCNC: 0.1 MG/DL (ref 0–0.3)
BILIRUB SERPL-MCNC: 0.7 MG/DL (ref 0–1.2)
BUN SERPL-MCNC: 21 MG/DL (ref 6–23)
CALCIUM SERPL-MCNC: 9.2 MG/DL (ref 8.6–10.3)
CHLORIDE SERPL-SCNC: 101 MMOL/L (ref 98–107)
CHOLEST SERPL-MCNC: 115 MG/DL (ref 0–199)
CHOLESTEROL/HDL RATIO: 2
CO2 SERPL-SCNC: 27 MMOL/L (ref 21–32)
CREAT SERPL-MCNC: 1.06 MG/DL (ref 0.5–1.05)
EGFRCR SERPLBLD CKD-EPI 2021: 56 ML/MIN/1.73M*2
ERYTHROCYTE [DISTWIDTH] IN BLOOD BY AUTOMATED COUNT: 17.3 % (ref 11.5–14.5)
GLUCOSE SERPL-MCNC: 101 MG/DL (ref 74–99)
HCT VFR BLD AUTO: 42.3 % (ref 36–46)
HDLC SERPL-MCNC: 57.7 MG/DL
HGB BLD-MCNC: 13.6 G/DL (ref 12–16)
LDLC SERPL CALC-MCNC: 42 MG/DL
MAGNESIUM SERPL-MCNC: 2.21 MG/DL (ref 1.6–2.4)
MCH RBC QN AUTO: 28.2 PG (ref 26–34)
MCHC RBC AUTO-ENTMCNC: 32.2 G/DL (ref 32–36)
MCV RBC AUTO: 88 FL (ref 80–100)
NON HDL CHOLESTEROL: 57 MG/DL (ref 0–149)
NRBC BLD-RTO: 0 /100 WBCS (ref 0–0)
PLATELET # BLD AUTO: 224 X10*3/UL (ref 150–450)
POTASSIUM SERPL-SCNC: 4.3 MMOL/L (ref 3.5–5.3)
PROT SERPL-MCNC: 6.3 G/DL (ref 6.4–8.2)
RBC # BLD AUTO: 4.82 X10*6/UL (ref 4–5.2)
SODIUM SERPL-SCNC: 138 MMOL/L (ref 136–145)
TRIGL SERPL-MCNC: 76 MG/DL (ref 0–149)
TSH SERPL-ACNC: 1.86 MIU/L (ref 0.44–3.98)
VLDL: 15 MG/DL (ref 0–40)
WBC # BLD AUTO: 7.4 X10*3/UL (ref 4.4–11.3)

## 2025-01-14 PROCEDURE — 83735 ASSAY OF MAGNESIUM: CPT

## 2025-01-14 PROCEDURE — 80061 LIPID PANEL: CPT

## 2025-01-14 PROCEDURE — 84443 ASSAY THYROID STIM HORMONE: CPT

## 2025-01-14 PROCEDURE — 85027 COMPLETE CBC AUTOMATED: CPT

## 2025-01-14 PROCEDURE — 80053 COMPREHEN METABOLIC PANEL: CPT

## 2025-01-14 PROCEDURE — 82248 BILIRUBIN DIRECT: CPT

## 2025-01-23 ENCOUNTER — APPOINTMENT (OUTPATIENT)
Dept: CARDIOLOGY | Facility: CLINIC | Age: 75
End: 2025-01-23
Payer: MEDICARE

## 2025-01-23 VITALS
HEART RATE: 70 BPM | BODY MASS INDEX: 19.33 KG/M2 | DIASTOLIC BLOOD PRESSURE: 68 MMHG | OXYGEN SATURATION: 99 % | HEIGHT: 65 IN | SYSTOLIC BLOOD PRESSURE: 122 MMHG | WEIGHT: 116 LBS

## 2025-01-23 DIAGNOSIS — C34.90 ADENOCARCINOMA OF LUNG, UNSPECIFIED LATERALITY (MULTI): ICD-10-CM

## 2025-01-23 DIAGNOSIS — E78.2 MIXED HYPERLIPIDEMIA: ICD-10-CM

## 2025-01-23 DIAGNOSIS — I47.29 NON-SUSTAINED VENTRICULAR TACHYCARDIA (MULTI): ICD-10-CM

## 2025-01-23 DIAGNOSIS — N18.31 STAGE 3A CHRONIC KIDNEY DISEASE (MULTI): Primary | ICD-10-CM

## 2025-01-23 DIAGNOSIS — I25.10 CAD S/P PERCUTANEOUS CORONARY ANGIOPLASTY: ICD-10-CM

## 2025-01-23 DIAGNOSIS — F32.0 DEPRESSION, MAJOR, SINGLE EPISODE, MILD (CMS-HCC): ICD-10-CM

## 2025-01-23 DIAGNOSIS — I10 ESSENTIAL HYPERTENSION: ICD-10-CM

## 2025-01-23 DIAGNOSIS — I25.5 CARDIOMYOPATHY, ISCHEMIC: ICD-10-CM

## 2025-01-23 DIAGNOSIS — R94.31 ABNORMAL EKG: ICD-10-CM

## 2025-01-23 DIAGNOSIS — R60.0 PERIPHERAL EDEMA: ICD-10-CM

## 2025-01-23 DIAGNOSIS — K21.9 GASTROESOPHAGEAL REFLUX DISEASE WITHOUT ESOPHAGITIS: ICD-10-CM

## 2025-01-23 DIAGNOSIS — I10 PRIMARY HYPERTENSION: ICD-10-CM

## 2025-01-23 DIAGNOSIS — Z98.61 CAD S/P PERCUTANEOUS CORONARY ANGIOPLASTY: ICD-10-CM

## 2025-01-23 PROCEDURE — 99214 OFFICE O/P EST MOD 30 MIN: CPT | Performed by: INTERNAL MEDICINE

## 2025-01-23 PROCEDURE — 3074F SYST BP LT 130 MM HG: CPT | Performed by: INTERNAL MEDICINE

## 2025-01-23 PROCEDURE — 1159F MED LIST DOCD IN RCRD: CPT | Performed by: INTERNAL MEDICINE

## 2025-01-23 PROCEDURE — 1123F ACP DISCUSS/DSCN MKR DOCD: CPT | Performed by: INTERNAL MEDICINE

## 2025-01-23 PROCEDURE — 3078F DIAST BP <80 MM HG: CPT | Performed by: INTERNAL MEDICINE

## 2025-01-23 PROCEDURE — 3008F BODY MASS INDEX DOCD: CPT | Performed by: INTERNAL MEDICINE

## 2025-01-23 RX ORDER — AMLODIPINE BESYLATE 5 MG/1
5 TABLET ORAL DAILY
Qty: 30 TABLET | Refills: 2 | Status: SHIPPED | OUTPATIENT
Start: 2025-01-23 | End: 2025-07-22

## 2025-01-23 RX ORDER — CARVEDILOL 25 MG/1
25 TABLET ORAL
Qty: 180 TABLET | Refills: 1 | Status: SHIPPED | OUTPATIENT
Start: 2025-01-23

## 2025-01-23 RX ORDER — LISINOPRIL 20 MG/1
20 TABLET ORAL DAILY
Qty: 30 TABLET | Refills: 1 | Status: SHIPPED | OUTPATIENT
Start: 2025-01-23

## 2025-01-23 RX ORDER — ASPIRIN 81 MG/1
81 TABLET ORAL DAILY
Qty: 90 TABLET | Refills: 3 | Status: SHIPPED | OUTPATIENT
Start: 2025-01-23 | End: 2026-01-23

## 2025-01-23 RX ORDER — ATORVASTATIN CALCIUM 80 MG/1
80 TABLET, FILM COATED ORAL DAILY
Qty: 90 TABLET | Refills: 1 | Status: SHIPPED | OUTPATIENT
Start: 2025-01-23

## 2025-01-23 RX ORDER — FUROSEMIDE 20 MG/1
20 TABLET ORAL DAILY
Qty: 30 TABLET | Refills: 0 | Status: SHIPPED | OUTPATIENT
Start: 2025-01-23 | End: 2026-01-23

## 2025-01-23 RX ORDER — CALCIUM CARBONATE 300MG(750)
1 TABLET,CHEWABLE ORAL DAILY
Qty: 90 TABLET | Refills: 1 | Status: SHIPPED | OUTPATIENT
Start: 2025-01-23

## 2025-01-23 RX ORDER — NITROGLYCERIN 0.4 MG/1
0.4 TABLET SUBLINGUAL EVERY 5 MIN PRN
Qty: 90 TABLET | Refills: 0 | Status: SHIPPED | OUTPATIENT
Start: 2025-01-23

## 2025-01-23 NOTE — PROGRESS NOTES
CARDIOLOGY OFFICE NOTE     Date:   1/23/2025    Patient:    Edu Domingo    YOB: 1950    Primary Physician: Abram Salmeron DO       Reason for Visit: Cardiology follow-up.       HPI:     Edu Domingo was seen in cardiac evaluation at the  Cardiology office January 23, 2025.      The patients problems are listed as in the impression below.    Electronic medical records reviewed.    Patient returns.Patient continues to do well overall.  She is limited somewhat by her chronic lower back pain.  She is on steroid injections now.  She is active otherwise.  She is asymptomatic.    Patient denies Chest Pain, SOB, Lightheadedness, Dizziness, TIA or CVA symptoms.  No CHF or Edema.  No Palpitations.  No GI,  or Bleeding Issues. No Recent Fever or Chills.     Cardiovascular and general review of systems is otherwise negative.    A 14-system review is otherwise negative, other than noted.     PHYSICAL EXAMINATION:      Vitals:    01/23/25 1415   BP: 122/68   Pulse: 70   SpO2: 99%     General: No acute distress. Vital signs as noted. Alert and oriented.  Head And Neck Examination: No jugular venous distention, no carotid bruits, no mass. Carotid upstrokes preserved. Oral mucosa moist.  No xanthelasma. Head and neck examination otherwise unremarkable.  Lungs: Clear to auscultation and percussion. No wheezes, no rales,  and no rhonchi.  Chest: Excursion appeared to be normal. No chest wall tenderness on palpation.  Heart: Normal S1 and S2. No S3. No S4. No rub. Grade 1/6 systolic murmur, best heard at the left sternal border. Point of maximal impulse was within normal limits.  Abdomen: Soft. Nontender. No organomegaly. No bruits. No masses.   Extremities: No bipedal edema. No clubbing. No cyanosis.  Pulses are strong throughout. No bruits.  Musculoskeletal Exam: No ulcers, otherwise unremarkable.  Neuro: Neurologically appeared grossly intact.     IMPRESSION:       Cardiovascular status  stable  Asymptomatic  Coronary artery disease history of RCA PCI, moderate known left circumflex disease, medical treatment  Ischemic cardiomyopathy, resolved  LVEF 55% by echocardiogram 11/2022 with inferior lateral hypokinesia  Nonsustained ventricular tachycardia  COPD  History of prior TIA  Prior lobectomy 2014  Depression  Degenerative joint disease  Anxiety  Hypertension  Hyperlipidemia  Nephrolithiasis, post lithotripsy and resection surgery, 2024  Ongoing tobacco abuse  Family history of coronary artery disease  Otherwise as per assessment below.    RECOMMENDATIONS:      Patient is continuing to do well.  Would suggest continuing current medication.  Refills were provided.    Exercise dietary program.    Hydration.    [employer] portal use was encouraged.    We will plan to see back in 6 months with Laboratory Studies and ECG as ordered.     Patient will follow up with their primary physician for general care.    The patient knows to contact medical care earlier if need be.      ALLERGIES:     Allergies   Allergen Reactions    Morphine Unknown     agitation    Myrbetriq [Mirabegron] Other     Elevated blood pressure.    Oxycodone-Acetaminophen Unknown     agitation    Sulfa (Sulfonamide Antibiotics) Unknown        MEDICATIONS:     Current Outpatient Medications   Medication Instructions    acetaminophen (TYLENOL ORAL) 1 tablet, Daily PRN    albuterol 90 mcg/actuation inhaler INHALE 2 PUFFS BY MOUTH AS INSTRUCTED EVERY 4 HOURS AS NEEDED FOR WHEEZING / SHORTNESS OF BREATH    ALPRAZolam (XANAX) 0.5 mg, oral, 3 times daily PRN    ALPRAZolam (XANAX) 0.5 mg, oral, 3 times daily PRN    amLODIPine (NORVASC) 5 mg, oral, Daily    ammonium lactate (Lac-Hydrin) 12 % lotion Topical, As needed    Anoro Ellipta 62.5-25 mcg/actuation blister with device INHALE 1 PUFF BY MOUTH AS INSTRUCTED ONCE DAILY.    aspirin 81 mg EC tablet 1 tablet, Daily    atorvastatin (LIPITOR) 80 mg, oral, Daily    carvedilol (COREG) 25 mg, oral, 2  times daily (morning and late afternoon)    celecoxib (CELEBREX) 200 mg, oral, Daily PRN    cetirizine HCl (ZYRTEC ORAL) 1 capsule, Daily PRN    cholecalciferol (Vitamin D-3) 50 MCG (2000 UT) tablet Take by mouth.    furosemide (LASIX) 20 mg, oral, Daily    Gemtesa 75 mg, oral, Daily    hydrocortisone 2.5 % cream Topical, 2 times daily PRN    lisinopril 20 mg, oral, Daily    magnesium oxide (MAG-OX) 400 mg, oral, Daily    methylPREDNISolone (Medrol Dospak) 4 mg tablets Use as directed by package instructions    nitroglycerin (NITROSTAT) 0.4 mg, sublingual, Every 5 min PRN, Times three PRN    oxybutynin XL (DITROPAN-XL) 10 mg, oral, Daily, Do not crush, chew, or split.    oxygen (O2) gas therapy at bedtime    pregabalin (LYRICA) 25 mg, oral, 2 times daily    traMADol (ULTRAM) 50 mg, oral, Every 8 hours PRN    venlafaxine XR (EFFEXOR-XR) 37.5 mg, oral, Daily, Do not crush or chew.       ELECTROCARDIOGRAM:      None this visit    CARDIAC TESTING:      None this visit    LABORATORY DATA:      CBC:   Lab Results   Component Value Date    WBC 7.4 01/14/2025    RBC 4.82 01/14/2025    HGB 13.6 01/14/2025    HCT 42.3 01/14/2025     01/14/2025        CMP:    Lab Results   Component Value Date     01/14/2025    K 4.3 01/14/2025     01/14/2025    CO2 27 01/14/2025    BUN 21 01/14/2025    CREATININE 1.06 (H) 01/14/2025    GLUCOSE 101 (H) 01/14/2025    CALCIUM 9.2 01/14/2025       Magnesium:    Lab Results   Component Value Date    MG 2.21 01/14/2025       Lipid Profile:    Lab Results   Component Value Date    CHOL 115 01/14/2025    TRIG 76 01/14/2025    HDL 57.7 01/14/2025    LDLF 109 (H) 09/29/2022 January 2025: LDL 42.    Hepatic Function Panel:    Lab Results   Component Value Date    ALKPHOS 81 01/14/2025    ALT 16 01/14/2025    AST 19 01/14/2025    PROT 6.3 (L) 01/14/2025    BILITOT 0.7 01/14/2025    BILIDIR 0.1 01/14/2025       TSH:    Lab Results   Component Value Date    TSH 1.86 01/14/2025        HgBA1c:    Lab Results   Component Value Date    HGBA1C 5.9 (H) 08/30/2024       BNP:   Lab Results   Component Value Date    BNP 1,066 (H) 12/05/2021        PT/INR:    Lab Results   Component Value Date    PROTIME 11.5 05/23/2023    INR 1.0 05/23/2023       Cardiac Enzymes:    Lab Results   Component Value Date    TROPHS 8 11/26/2022    TROPHS 6 11/26/2022    TROPHS 7 11/26/2022         PROBLEM LIST:     Patient Active Problem List   Diagnosis    Abdominal pain    Abnormal EKG    Adenocarcinoma of lung (Multi)    Antibiotic-associated diarrhea    Anxiety    Atypical chest pain    Back skin lesion    Benign skin cyst    Bilateral carotid bruits    Blepharitis    Bright red rectal bleeding    Bruit of left carotid artery    CAD S/P percutaneous coronary angioplasty    Cellulitis of left axilla    Cellulitis of right axilla    Cellulitis of right ear    Cellulitis, lip    Chest pain    Colitis    Depression, major, single episode, mild (CMS-HCC)    Dizziness    Essential hypertension    Excessive ear wax, bilateral    External hemorrhoids    GERD (gastroesophageal reflux disease)    Hand injury, right, initial encounter    Cephalgia    Headache    Chronic hip pain    Hip pain, left    Hip pain, right    Hives    Hyperlipidemia    Hypertension    Left lumbar radiculitis    Menopausal state    Multiple dysplastic nevi    Near syncope    Nocturia    Nicotine use disorder    Arthritis, degenerative    Osteoarthritis    Overactive bladder    Pain due to onychomycosis of nail    Palpitations    Pneumonia    Post-traumatic osteoarthritis of both ankles    Primary nocturnal enuresis    Radial nerve compression    Rectal bleed    Skin lesion of lower extremity    Squamous cell skin cancer    STEMI (ST elevation myocardial infarction) (Multi)    Carotid artery stenosis    Stenosis of right carotid artery    Stye    Subcutaneous mass    Syncope    Urge incontinence of urine    Urinary incontinence    Urinary urgency    UTI  (urinary tract infection)    Vulvar intraepithelial neoplasia (DAMIEN) grade 3    Nephrolithiasis    Sprain of right hand    Skin lesion of face    Acute URI    Right otitis externa    Pharyngitis, acute    Acute sinusitis    Acute otitis media    Acute otitis externa    Acute bronchitis    Cellulitis of face    Non-sustained ventricular tachycardia (Multi)    Actinic keratosis    Other seborrheic keratosis    Chronic cough    Coronary artery disease with unstable angina pectoris (Multi)    Edema of both lower extremities    General weakness    Hematuria    Hypertensive renovascular disease    Injury of radial nerve    Neoplasm of uncertain behavior of skin    Osteoarthritis of carpometacarpal (CMC) joint of thumb    Other melanin hyperpigmentation    Personal history of other malignant neoplasm of skin    Pyelonephritis    Sleep attack    Subclavian steal syndrome    Greater trochanteric pain syndrome    Benign neoplasm of soft tissues of upper limb    Hemangioma of skin and subcutaneous tissue    Abscess of face    Squamous cell carcinoma of skin of lower limb, including hip    BMI 22.0-22.9, adult    Nuclear sclerotic cataract of both eyes    Vulvar dysplasia    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    Patient was seen and examined with total time of visit including chart preparation, rooming, and chart completion exceeding 40 minutes.      ----

## 2025-02-03 ENCOUNTER — OFFICE VISIT (OUTPATIENT)
Dept: PRIMARY CARE | Facility: CLINIC | Age: 75
End: 2025-02-03
Payer: MEDICARE

## 2025-02-03 VITALS
DIASTOLIC BLOOD PRESSURE: 70 MMHG | BODY MASS INDEX: 18.97 KG/M2 | TEMPERATURE: 97.3 F | WEIGHT: 114 LBS | SYSTOLIC BLOOD PRESSURE: 140 MMHG

## 2025-02-03 DIAGNOSIS — G47.36 NOCTURNAL HYPOXEMIA DUE TO EMPHYSEMA (MULTI): ICD-10-CM

## 2025-02-03 DIAGNOSIS — G95.9 SPINAL CORD LESION (MULTI): ICD-10-CM

## 2025-02-03 DIAGNOSIS — H60.503 ACUTE OTITIS EXTERNA OF BOTH EARS, UNSPECIFIED TYPE: Primary | ICD-10-CM

## 2025-02-03 DIAGNOSIS — J43.9 NOCTURNAL HYPOXEMIA DUE TO EMPHYSEMA (MULTI): ICD-10-CM

## 2025-02-03 PROCEDURE — 3077F SYST BP >= 140 MM HG: CPT | Performed by: FAMILY MEDICINE

## 2025-02-03 PROCEDURE — 99213 OFFICE O/P EST LOW 20 MIN: CPT | Performed by: FAMILY MEDICINE

## 2025-02-03 PROCEDURE — 1159F MED LIST DOCD IN RCRD: CPT | Performed by: FAMILY MEDICINE

## 2025-02-03 PROCEDURE — 3078F DIAST BP <80 MM HG: CPT | Performed by: FAMILY MEDICINE

## 2025-02-03 PROCEDURE — 1160F RVW MEDS BY RX/DR IN RCRD: CPT | Performed by: FAMILY MEDICINE

## 2025-02-03 PROCEDURE — 1123F ACP DISCUSS/DSCN MKR DOCD: CPT | Performed by: FAMILY MEDICINE

## 2025-02-03 RX ORDER — NEOMYCIN SULFATE, POLYMYXIN B SULFATE AND HYDROCORTISONE 10; 3.5; 1 MG/ML; MG/ML; [USP'U]/ML
3 SUSPENSION/ DROPS AURICULAR (OTIC) 4 TIMES DAILY
Qty: 10 ML | Refills: 0 | Status: SHIPPED | OUTPATIENT
Start: 2025-02-03 | End: 2025-02-13

## 2025-02-03 NOTE — PROGRESS NOTES
"Subjective   Patient ID: [id number]     Edu Domingo is a 74 y.o. female who presents for Earache (Both ears.).  HPI  She complains of pain in both of her ears. It is worse on the right.  It has been bothering her for a week.  No swimming.  Has had some nasal congestion and has had to restart using the nasal spray.       She has had a schwannoma, causing low back pain initally thought to be a malignancy--but now a benign process is favored.      She has had a history of lung cancer and COPD.  No cough or SOB worse than usual.  She still smokes \"but I have really cut back\".               Objective     /70 (BP Location: Right arm, Patient Position: Sitting)   Temp 36.3 °C (97.3 °F) (Skin)   Wt 51.7 kg (114 lb)   LMP  (LMP Unknown)   BMI 18.97 kg/m²      Physical Exam  Constitutional:       General: She is not in acute distress.     Appearance: Normal appearance. She is not ill-appearing, toxic-appearing or diaphoretic.   HENT:      Ears:      Comments: Right EAC tender to palpation.  No drainage.     TM's difficult to visualize.  Left EAC less tender. Swollen tragus on the right.   Neurological:      Mental Status: She is alert.         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  This appears to be an outer ear infection.  Return in three months for a recheck.   Abram Salmeron, DO   "

## 2025-02-03 NOTE — PATIENT INSTRUCTIONS
I ordered an antibiotic ear drop for your ears.  This appears to be an outer ear infection.  Return in three months for a recheck.

## 2025-02-12 ENCOUNTER — TELEPHONE (OUTPATIENT)
Dept: PRIMARY CARE | Facility: CLINIC | Age: 75
End: 2025-02-12
Payer: MEDICARE

## 2025-02-12 DIAGNOSIS — F32.0 DEPRESSION, MAJOR, SINGLE EPISODE, MILD (CMS-HCC): ICD-10-CM

## 2025-02-12 NOTE — TELEPHONE ENCOUNTER
REFILL  MEDICATION:     Alprazolam 0.5 MG; Take 1 tablet 3 times a day as needed for anxiety.     PHARM: [facility]   PHARM NUMBER: [phone]    LR: 11/12/24       90 tablets with 0 refills  LV: 2/3/25  NV: 4/25/25

## 2025-02-13 RX ORDER — ALPRAZOLAM 0.5 MG/1
0.5 TABLET ORAL 3 TIMES DAILY PRN
Qty: 90 TABLET | Refills: 0 | Status: SHIPPED | OUTPATIENT
Start: 2025-02-13

## 2025-03-11 ENCOUNTER — OFFICE VISIT (OUTPATIENT)
Dept: GYNECOLOGIC ONCOLOGY | Facility: CLINIC | Age: 75
End: 2025-03-11
Payer: MEDICARE

## 2025-03-11 ENCOUNTER — TELEPHONE (OUTPATIENT)
Dept: GYNECOLOGIC ONCOLOGY | Facility: HOSPITAL | Age: 75
End: 2025-03-11

## 2025-03-11 VITALS
RESPIRATION RATE: 16 BRPM | WEIGHT: 112.66 LBS | OXYGEN SATURATION: 94 % | DIASTOLIC BLOOD PRESSURE: 58 MMHG | HEART RATE: 60 BPM | TEMPERATURE: 100 F | SYSTOLIC BLOOD PRESSURE: 126 MMHG | BODY MASS INDEX: 18.75 KG/M2

## 2025-03-11 DIAGNOSIS — N90.3 VULVAR DYSPLASIA: ICD-10-CM

## 2025-03-11 DIAGNOSIS — N76.4 VULVAR ABSCESS: Primary | ICD-10-CM

## 2025-03-11 PROCEDURE — 3074F SYST BP LT 130 MM HG: CPT | Performed by: NURSE PRACTITIONER

## 2025-03-11 PROCEDURE — 3078F DIAST BP <80 MM HG: CPT | Performed by: NURSE PRACTITIONER

## 2025-03-11 PROCEDURE — 1125F AMNT PAIN NOTED PAIN PRSNT: CPT | Performed by: NURSE PRACTITIONER

## 2025-03-11 PROCEDURE — 99214 OFFICE O/P EST MOD 30 MIN: CPT | Performed by: NURSE PRACTITIONER

## 2025-03-11 PROCEDURE — 1159F MED LIST DOCD IN RCRD: CPT | Performed by: NURSE PRACTITIONER

## 2025-03-11 PROCEDURE — 1123F ACP DISCUSS/DSCN MKR DOCD: CPT | Performed by: NURSE PRACTITIONER

## 2025-03-11 RX ORDER — DOXYCYCLINE 100 MG/1
100 CAPSULE ORAL 2 TIMES DAILY
Qty: 20 CAPSULE | Refills: 0 | Status: ON HOLD | OUTPATIENT
Start: 2025-03-11 | End: 2025-03-21

## 2025-03-11 RX ORDER — TRAMADOL HYDROCHLORIDE 50 MG/1
50 TABLET ORAL EVERY 8 HOURS PRN
Qty: 10 TABLET | Refills: 0 | Status: ON HOLD | OUTPATIENT
Start: 2025-03-11 | End: 2025-03-16

## 2025-03-11 ASSESSMENT — PAIN SCALES - GENERAL: PAINLEVEL_OUTOF10: 5

## 2025-03-11 NOTE — PROGRESS NOTES
Patient ID: Edu Domingo is a 74 y.o. female.  Primary Care Provider: DO Oneil Aviles    Referred by Dr. 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    21 R vulvar biopsy:  DAMIEN-3 with HPV cytopathic effect.    1/ PPD smoker x 30 years     PMH:  Hypertension, overactive bladder, hypercholesterolemia, anxiety. History of lung cancer in  (underwent LL lobectomy followed by chemo)- per patient they did not think lung was the primary. History of skin cancer on legs.    PSH: Left lower lobectomy , right arm ganglion cyst removal, lesions excised on legs for skin cancer     Family history: Mother with rectal cancer, father with pancreatic cancer, maternal aunt breast, maternal aunt cervix cancer    ,  x2. No abnormal paps last in . Menopause in .       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  Patient last seen in December with vulvar abscess called the office with recurrent abscess.  She states she noticed area of drainage that started on Friday, increased in amount and now soaking a pad.  Erythema and pain and swelling increased over the weekend.  She states she had a 101 degree fever the last few days taking Tylenol around the clock with no relief of pain.          Physical Exam:    Constitutional: Tearful  Eyes: PERRL  ENMT: Moist mucus membranes  Head/Neck: Supple. Symmetrical  Genitourinary:    Well healed incision on right.   Left vulva with erythema and induration spreading to left mons and left perineum.  Purulent drainage noted from inferior portion of left vulva.  Painful, warm to touch.    Musculoskeletal:  ROM intact, no joint swelling, normal strength  Extremities: No edema  Neurological: Alert and oriented x 3. Pleasant and cooperative.  Psychological: Appropriate mood and behavior  Skin: Warm and dry, no lesions, no rashes    A complete review of systems was performed and all systems were normal except what is noted in the interval history.          Assessment/Plan  Patient is a 74 year old female s/p R wide excision of anterior vulvar/thigh lesion, R wide excision of perianal lesion, and R thigh punch biopsy on 6/2/2021 for VIN3. Vulvar abscess, febrile.      Plan:   Encouraged patient to proceed to ER for pain medication and IV antibiotics.  Patient declines  RX for Tramadol sent  RX for Doxycycline 100 mg x 10 days sent  F/U in 1 week for wound check  Warm baths, sitz baths, compresses for comfort  Instructed to proceed to ER if no improvement in symptoms in 24 hours

## 2025-03-11 NOTE — TELEPHONE ENCOUNTER
I called the patient to assess pain and sore on vulva. The patient did not answer and I left a voicemail for the patient to return my call.

## 2025-03-12 ENCOUNTER — APPOINTMENT (OUTPATIENT)
Dept: RADIOLOGY | Facility: HOSPITAL | Age: 75
End: 2025-03-12
Payer: MEDICARE

## 2025-03-12 ENCOUNTER — HOSPITAL ENCOUNTER (INPATIENT)
Facility: HOSPITAL | Age: 75
End: 2025-03-12
Attending: STUDENT IN AN ORGANIZED HEALTH CARE EDUCATION/TRAINING PROGRAM | Admitting: STUDENT IN AN ORGANIZED HEALTH CARE EDUCATION/TRAINING PROGRAM
Payer: MEDICARE

## 2025-03-12 ENCOUNTER — HOSPITAL ENCOUNTER (EMERGENCY)
Facility: HOSPITAL | Age: 75
Discharge: ANOTHER HOSPITAL | End: 2025-03-12
Attending: EMERGENCY MEDICINE
Payer: MEDICARE

## 2025-03-12 ENCOUNTER — TELEPHONE (OUTPATIENT)
Dept: GYNECOLOGIC ONCOLOGY | Facility: HOSPITAL | Age: 75
End: 2025-03-12
Payer: MEDICARE

## 2025-03-12 ENCOUNTER — HOSPITAL ENCOUNTER (INPATIENT)
Facility: HOSPITAL | Age: 75
DRG: 746 | End: 2025-03-12
Attending: EMERGENCY MEDICINE | Admitting: OBSTETRICS & GYNECOLOGY
Payer: MEDICARE

## 2025-03-12 VITALS
HEIGHT: 65 IN | HEART RATE: 78 BPM | TEMPERATURE: 96.8 F | RESPIRATION RATE: 16 BRPM | SYSTOLIC BLOOD PRESSURE: 95 MMHG | BODY MASS INDEX: 18.66 KG/M2 | OXYGEN SATURATION: 94 % | DIASTOLIC BLOOD PRESSURE: 52 MMHG | WEIGHT: 112 LBS

## 2025-03-12 DIAGNOSIS — L02.215 PERINEAL ABSCESS: Primary | ICD-10-CM

## 2025-03-12 DIAGNOSIS — L02.214 GROIN ABSCESS: Primary | ICD-10-CM

## 2025-03-12 DIAGNOSIS — L03.90 CELLULITIS, UNSPECIFIED CELLULITIS SITE: ICD-10-CM

## 2025-03-12 DIAGNOSIS — D72.829 LEUKOCYTOSIS, UNSPECIFIED TYPE: ICD-10-CM

## 2025-03-12 LAB
ALBUMIN SERPL BCP-MCNC: 3.5 G/DL (ref 3.4–5)
ALP SERPL-CCNC: 108 U/L (ref 33–136)
ALT SERPL W P-5'-P-CCNC: 13 U/L (ref 7–45)
ANION GAP SERPL CALC-SCNC: 17 MMOL/L (ref 10–20)
APPEARANCE UR: CLEAR
AST SERPL W P-5'-P-CCNC: 15 U/L (ref 9–39)
BACTERIA #/AREA URNS AUTO: ABNORMAL /HPF
BASOPHILS # BLD AUTO: 0.14 X10*3/UL (ref 0–0.1)
BASOPHILS NFR BLD AUTO: 0.4 %
BILIRUB SERPL-MCNC: 0.7 MG/DL (ref 0–1.2)
BILIRUB UR STRIP.AUTO-MCNC: NEGATIVE MG/DL
BUN SERPL-MCNC: 23 MG/DL (ref 6–23)
CALCIUM SERPL-MCNC: 9.5 MG/DL (ref 8.6–10.3)
CHLORIDE SERPL-SCNC: 93 MMOL/L (ref 98–107)
CO2 SERPL-SCNC: 24 MMOL/L (ref 21–32)
COLOR UR: YELLOW
CREAT SERPL-MCNC: 0.84 MG/DL (ref 0.5–1.05)
EGFRCR SERPLBLD CKD-EPI 2021: 74 ML/MIN/1.73M*2
EOSINOPHIL # BLD AUTO: 0.07 X10*3/UL (ref 0–0.4)
EOSINOPHIL NFR BLD AUTO: 0.2 %
ERYTHROCYTE [DISTWIDTH] IN BLOOD BY AUTOMATED COUNT: 17 % (ref 11.5–14.5)
GLUCOSE SERPL-MCNC: 83 MG/DL (ref 74–99)
GLUCOSE UR STRIP.AUTO-MCNC: NORMAL MG/DL
GRAN CASTS #/AREA UR COMP ASSIST: ABNORMAL /LPF
HCT VFR BLD AUTO: 39.3 % (ref 36–46)
HGB BLD-MCNC: 13 G/DL (ref 12–16)
IMM GRANULOCYTES # BLD AUTO: 0.75 X10*3/UL (ref 0–0.5)
IMM GRANULOCYTES NFR BLD AUTO: 2.2 % (ref 0–0.9)
KETONES UR STRIP.AUTO-MCNC: ABNORMAL MG/DL
LACTATE SERPL-SCNC: 1.8 MMOL/L (ref 0.4–2)
LEUKOCYTE ESTERASE UR QL STRIP.AUTO: ABNORMAL
LYMPHOCYTES # BLD AUTO: 1.47 X10*3/UL (ref 0.8–3)
LYMPHOCYTES NFR BLD AUTO: 4.4 %
MCH RBC QN AUTO: 29.6 PG (ref 26–34)
MCHC RBC AUTO-ENTMCNC: 33.1 G/DL (ref 32–36)
MCV RBC AUTO: 90 FL (ref 80–100)
MONOCYTES # BLD AUTO: 2.31 X10*3/UL (ref 0.05–0.8)
MONOCYTES NFR BLD AUTO: 6.9 %
NEUTROPHILS # BLD AUTO: 28.83 X10*3/UL (ref 1.6–5.5)
NEUTROPHILS NFR BLD AUTO: 85.9 %
NITRITE UR QL STRIP.AUTO: NEGATIVE
NRBC BLD-RTO: 0 /100 WBCS (ref 0–0)
PH UR STRIP.AUTO: 6 [PH]
PLATELET # BLD AUTO: 192 X10*3/UL (ref 150–450)
POTASSIUM SERPL-SCNC: 3.7 MMOL/L (ref 3.5–5.3)
PROT SERPL-MCNC: 6.7 G/DL (ref 6.4–8.2)
PROT UR STRIP.AUTO-MCNC: ABNORMAL MG/DL
RBC # BLD AUTO: 4.39 X10*6/UL (ref 4–5.2)
RBC # UR STRIP.AUTO: ABNORMAL MG/DL
RBC #/AREA URNS AUTO: ABNORMAL /HPF
SODIUM SERPL-SCNC: 130 MMOL/L (ref 136–145)
SP GR UR STRIP.AUTO: 1.04
SQUAMOUS #/AREA URNS AUTO: ABNORMAL /HPF
UROBILINOGEN UR STRIP.AUTO-MCNC: NORMAL MG/DL
WBC # BLD AUTO: 33.6 X10*3/UL (ref 4.4–11.3)
WBC #/AREA URNS AUTO: ABNORMAL /HPF

## 2025-03-12 PROCEDURE — 96375 TX/PRO/DX INJ NEW DRUG ADDON: CPT

## 2025-03-12 PROCEDURE — 85025 COMPLETE CBC W/AUTO DIFF WBC: CPT | Performed by: PHYSICIAN ASSISTANT

## 2025-03-12 PROCEDURE — 74177 CT ABD & PELVIS W/CONTRAST: CPT | Mod: FOREIGN READ | Performed by: RADIOLOGY

## 2025-03-12 PROCEDURE — 84460 ALANINE AMINO (ALT) (SGPT): CPT | Performed by: PHYSICIAN ASSISTANT

## 2025-03-12 PROCEDURE — 99285 EMERGENCY DEPT VISIT HI MDM: CPT | Performed by: EMERGENCY MEDICINE

## 2025-03-12 PROCEDURE — 96366 THER/PROPH/DIAG IV INF ADDON: CPT

## 2025-03-12 PROCEDURE — 96376 TX/PRO/DX INJ SAME DRUG ADON: CPT

## 2025-03-12 PROCEDURE — 74177 CT ABD & PELVIS W/CONTRAST: CPT

## 2025-03-12 PROCEDURE — 87086 URINE CULTURE/COLONY COUNT: CPT | Mod: STJLAB | Performed by: PHYSICIAN ASSISTANT

## 2025-03-12 PROCEDURE — 36415 COLL VENOUS BLD VENIPUNCTURE: CPT | Performed by: PHYSICIAN ASSISTANT

## 2025-03-12 PROCEDURE — 2500000005 HC RX 250 GENERAL PHARMACY W/O HCPCS: Performed by: PHYSICIAN ASSISTANT

## 2025-03-12 PROCEDURE — 99222 1ST HOSP IP/OBS MODERATE 55: CPT

## 2025-03-12 PROCEDURE — 83605 ASSAY OF LACTIC ACID: CPT | Performed by: PHYSICIAN ASSISTANT

## 2025-03-12 PROCEDURE — 2500000001 HC RX 250 WO HCPCS SELF ADMINISTERED DRUGS (ALT 637 FOR MEDICARE OP): Performed by: PHYSICIAN ASSISTANT

## 2025-03-12 PROCEDURE — 96361 HYDRATE IV INFUSION ADD-ON: CPT

## 2025-03-12 PROCEDURE — 87077 CULTURE AEROBIC IDENTIFY: CPT | Mod: STJLAB | Performed by: PHYSICIAN ASSISTANT

## 2025-03-12 PROCEDURE — 96367 TX/PROPH/DG ADDL SEQ IV INF: CPT

## 2025-03-12 PROCEDURE — 2550000001 HC RX 255 CONTRASTS: Performed by: EMERGENCY MEDICINE

## 2025-03-12 PROCEDURE — 81001 URINALYSIS AUTO W/SCOPE: CPT | Performed by: PHYSICIAN ASSISTANT

## 2025-03-12 PROCEDURE — 99285 EMERGENCY DEPT VISIT HI MDM: CPT | Mod: 25 | Performed by: EMERGENCY MEDICINE

## 2025-03-12 PROCEDURE — 96365 THER/PROPH/DIAG IV INF INIT: CPT | Mod: 59

## 2025-03-12 PROCEDURE — 2500000004 HC RX 250 GENERAL PHARMACY W/ HCPCS (ALT 636 FOR OP/ED): Performed by: PHYSICIAN ASSISTANT

## 2025-03-12 RX ORDER — VANCOMYCIN HYDROCHLORIDE 1 G/200ML
1000 INJECTION, SOLUTION INTRAVENOUS EVERY 24 HOURS
Status: DISCONTINUED | OUTPATIENT
Start: 2025-03-13 | End: 2025-03-12 | Stop reason: HOSPADM

## 2025-03-12 RX ORDER — ACETAMINOPHEN 325 MG/1
975 TABLET ORAL ONCE
Status: COMPLETED | OUTPATIENT
Start: 2025-03-12 | End: 2025-03-12

## 2025-03-12 RX ORDER — VANCOMYCIN HYDROCHLORIDE 1 G/20ML
INJECTION, POWDER, LYOPHILIZED, FOR SOLUTION INTRAVENOUS DAILY PRN
Status: DISCONTINUED | OUTPATIENT
Start: 2025-03-12 | End: 2025-03-12

## 2025-03-12 RX ORDER — CLINDAMYCIN PHOSPHATE 900 MG/50ML
900 INJECTION, SOLUTION INTRAVENOUS EVERY 8 HOURS
Status: DISCONTINUED | OUTPATIENT
Start: 2025-03-13 | End: 2025-03-12 | Stop reason: HOSPADM

## 2025-03-12 RX ORDER — VANCOMYCIN HYDROCHLORIDE 1 G/200ML
1000 INJECTION, SOLUTION INTRAVENOUS EVERY 24 HOURS
Status: DISCONTINUED | OUTPATIENT
Start: 2025-03-12 | End: 2025-03-12

## 2025-03-12 RX ORDER — VANCOMYCIN HYDROCHLORIDE 1 G/200ML
1000 INJECTION, SOLUTION INTRAVENOUS ONCE
Status: COMPLETED | OUTPATIENT
Start: 2025-03-12 | End: 2025-03-12

## 2025-03-12 RX ORDER — CLINDAMYCIN PHOSPHATE 900 MG/50ML
900 INJECTION, SOLUTION INTRAVENOUS ONCE
Status: COMPLETED | OUTPATIENT
Start: 2025-03-12 | End: 2025-03-12

## 2025-03-12 RX ORDER — KETOROLAC TROMETHAMINE 15 MG/ML
15 INJECTION, SOLUTION INTRAMUSCULAR; INTRAVENOUS ONCE
Status: COMPLETED | OUTPATIENT
Start: 2025-03-12 | End: 2025-03-12

## 2025-03-12 RX ADMIN — CLINDAMYCIN PHOSPHATE 900 MG: 900 INJECTION, SOLUTION INTRAVENOUS at 13:19

## 2025-03-12 RX ADMIN — ACETAMINOPHEN 975 MG: 325 TABLET ORAL at 18:31

## 2025-03-12 RX ADMIN — SODIUM CHLORIDE, POTASSIUM CHLORIDE, SODIUM LACTATE AND CALCIUM CHLORIDE 1000 ML: 600; 310; 30; 20 INJECTION, SOLUTION INTRAVENOUS at 20:47

## 2025-03-12 RX ADMIN — IOHEXOL 70 ML: 350 INJECTION, SOLUTION INTRAVENOUS at 12:29

## 2025-03-12 RX ADMIN — HYDROMORPHONE HYDROCHLORIDE 0.5 MG: 1 INJECTION, SOLUTION INTRAMUSCULAR; INTRAVENOUS; SUBCUTANEOUS at 11:12

## 2025-03-12 RX ADMIN — PIPERACILLIN SODIUM AND TAZOBACTAM SODIUM 4.5 G: 4; .5 INJECTION, SOLUTION INTRAVENOUS at 11:14

## 2025-03-12 RX ADMIN — HYDROMORPHONE HYDROCHLORIDE 0.5 MG: 1 INJECTION, SOLUTION INTRAMUSCULAR; INTRAVENOUS; SUBCUTANEOUS at 18:17

## 2025-03-12 RX ADMIN — Medication 2 L/MIN: at 19:28

## 2025-03-12 RX ADMIN — PIPERACILLIN SODIUM AND TAZOBACTAM SODIUM 4.5 G: 4; .5 INJECTION, SOLUTION INTRAVENOUS at 18:17

## 2025-03-12 RX ADMIN — SODIUM CHLORIDE, POTASSIUM CHLORIDE, SODIUM LACTATE AND CALCIUM CHLORIDE 1500 ML: 600; 310; 30; 20 INJECTION, SOLUTION INTRAVENOUS at 11:13

## 2025-03-12 RX ADMIN — KETOROLAC TROMETHAMINE 15 MG: 15 INJECTION, SOLUTION INTRAMUSCULAR; INTRAVENOUS at 18:31

## 2025-03-12 RX ADMIN — VANCOMYCIN HYDROCHLORIDE 1000 MG: 1 INJECTION, SOLUTION INTRAVENOUS at 11:46

## 2025-03-12 RX ADMIN — SODIUM CHLORIDE, POTASSIUM CHLORIDE, SODIUM LACTATE AND CALCIUM CHLORIDE 1000 ML: 600; 310; 30; 20 INJECTION, SOLUTION INTRAVENOUS at 21:39

## 2025-03-12 RX ADMIN — Medication 2 L/MIN: at 20:46

## 2025-03-12 ASSESSMENT — PAIN DESCRIPTION - PAIN TYPE: TYPE: ACUTE PAIN

## 2025-03-12 ASSESSMENT — LIFESTYLE VARIABLES
TOTAL SCORE: 0
HAVE YOU EVER FELT YOU SHOULD CUT DOWN ON YOUR DRINKING: NO
EVER HAD A DRINK FIRST THING IN THE MORNING TO STEADY YOUR NERVES TO GET RID OF A HANGOVER: NO
EVER FELT BAD OR GUILTY ABOUT YOUR DRINKING: NO
HAVE PEOPLE ANNOYED YOU BY CRITICIZING YOUR DRINKING: NO

## 2025-03-12 ASSESSMENT — PAIN SCALES - GENERAL
PAINLEVEL_OUTOF10: 9
PAINLEVEL_OUTOF10: 5 - MODERATE PAIN
PAINLEVEL_OUTOF10: 7
PAINLEVEL_OUTOF10: 10 - WORST POSSIBLE PAIN

## 2025-03-12 ASSESSMENT — COLUMBIA-SUICIDE SEVERITY RATING SCALE - C-SSRS
2. HAVE YOU ACTUALLY HAD ANY THOUGHTS OF KILLING YOURSELF?: NO
1. IN THE PAST MONTH, HAVE YOU WISHED YOU WERE DEAD OR WISHED YOU COULD GO TO SLEEP AND NOT WAKE UP?: NO
2. HAVE YOU ACTUALLY HAD ANY THOUGHTS OF KILLING YOURSELF?: NO
6. HAVE YOU EVER DONE ANYTHING, STARTED TO DO ANYTHING, OR PREPARED TO DO ANYTHING TO END YOUR LIFE?: NO
1. IN THE PAST MONTH, HAVE YOU WISHED YOU WERE DEAD OR WISHED YOU COULD GO TO SLEEP AND NOT WAKE UP?: NO
6. HAVE YOU EVER DONE ANYTHING, STARTED TO DO ANYTHING, OR PREPARED TO DO ANYTHING TO END YOUR LIFE?: NO

## 2025-03-12 ASSESSMENT — PAIN - FUNCTIONAL ASSESSMENT
PAIN_FUNCTIONAL_ASSESSMENT: 0-10
PAIN_FUNCTIONAL_ASSESSMENT: 0-10

## 2025-03-12 ASSESSMENT — PAIN DESCRIPTION - PROGRESSION: CLINICAL_PROGRESSION: OTHER (COMMENT)

## 2025-03-12 ASSESSMENT — PAIN DESCRIPTION - LOCATION: LOCATION: GROIN

## 2025-03-12 NOTE — TELEPHONE ENCOUNTER
Patient called and left message on nurse line stating vulva pain has not improved following office visit with Nicky Rodriguez CNP on 3/11/25.   Nicky prescribed Doxycyline and Tramadol and advised ER if pain not improved today.    Patient states she is going to [place] [facility] today for evaluation.     Message routed to Nicky to update.

## 2025-03-12 NOTE — ED PROVIDER NOTES
Emergency Department Provider Note        History of Present Illness     History provided by: Patient  Limitations to History: None  External Records Reviewed with Brief Summary:  outpatient obgyn notes from yesterday    HPI:  Edu Domingo is a 74 y.o. female  s/p R wide excision of anterior vulvar/thigh lesion, R wide excision of perianal lesion, and R thigh punch biopsy on 6/2/2021 for VIN3.  She also has a history of lung adenocarcinoma, atypical chest pain, hypertension, GERD, headaches, hyperlipidemia, squamous cell skin cancer, coronary artery disease with stents, arthritis, CKD who presents today for evaluation of a vulvar abscess, patient states that she has had 1 before ever since she had her excision in 2021, states that it was not as bad and it went away with antibiotics.  Patient then states she started noticing atraumatic redness pain and swelling in her groin region on Friday, patient states she was unable to get in with anybody until yesterday, yesterday when she saw OB/GYN they recommended she come to the ER for pain control and IV antibiotics, started on doxycycline since she refused.  Patient states she has had 1 dose of the doxycycline and 1 dose of tramadol without improvement.  She states that it is spreading and getting worse pretty quickly.  There is no dark discoloration, no crepitus, she denies any history of diabetes.  She denies any fevers currently but states that on Saturday she had a temperature of 101 °F.  She states it is painful to move her left leg.  Denies any chest pain or shortness of breath.    Physical Exam   Triage vitals:  T 36.9 °C (98.4 °F)  HR 97  /65  RR 15  O2 (!) 93 % None (Room air)    Physical Exam  Vitals and nursing note reviewed.   Constitutional:       General: She is not in acute distress.     Appearance: Normal appearance. She is not toxic-appearing.   HENT:      Head: Normocephalic and atraumatic.      Nose: Nose normal.   Eyes:      Extraocular  Movements: Extraocular movements intact.   Cardiovascular:      Rate and Rhythm: Normal rate and regular rhythm.   Pulmonary:      Effort: Pulmonary effort is normal.      Breath sounds: Normal breath sounds.   Abdominal:      Palpations: Abdomen is soft.   Genitourinary:      Musculoskeletal:         General: Normal range of motion.      Cervical back: Normal range of motion and neck supple.        Legs:       Comments: Area of warmth swelling and erythema as highlighted including the mons pubis, left labia majora, small area of drainage is present to posterior aspect.  No crepitus, no dark discoloration.   Skin:     General: Skin is warm and dry.   Neurological:      General: No focal deficit present.      Mental Status: She is alert.   Psychiatric:         Mood and Affect: Mood normal.         Thought Content: Thought content normal.        CT abdomen pelvis w IV contrast   Final Result   Inflammatory change in the left perineum with findings suggestive of   early abscess formation.  There is no definite gas bubbles at this   time.   Distended gallbladder.   Nonobstructing right renal calculi.   Signed by Heri Parish MD        Labs Reviewed   CBC WITH AUTO DIFFERENTIAL - Abnormal       Result Value    WBC 33.6 (*)     nRBC 0.0      RBC 4.39      Hemoglobin 13.0      Hematocrit 39.3      MCV 90      MCH 29.6      MCHC 33.1      RDW 17.0 (*)     Platelets 192      Neutrophils % 85.9      Immature Granulocytes %, Automated 2.2 (*)     Lymphocytes % 4.4      Monocytes % 6.9      Eosinophils % 0.2      Basophils % 0.4      Neutrophils Absolute 28.83 (*)     Immature Granulocytes Absolute, Automated 0.75 (*)     Lymphocytes Absolute 1.47      Monocytes Absolute 2.31 (*)     Eosinophils Absolute 0.07      Basophils Absolute 0.14 (*)    COMPREHENSIVE METABOLIC PANEL - Abnormal    Glucose 83      Sodium 130 (*)     Potassium 3.7      Chloride 93 (*)     Bicarbonate 24      Anion Gap 17      Urea Nitrogen 23       Creatinine 0.84      eGFR 74      Calcium 9.5      Albumin 3.5      Alkaline Phosphatase 108      Total Protein 6.7      AST 15      Bilirubin, Total 0.7      ALT 13     URINALYSIS WITH REFLEX CULTURE AND MICROSCOPIC - Abnormal    Color, Urine Yellow      Appearance, Urine Clear      Specific Gravity, Urine 1.036 (*)     pH, Urine 6.0      Protein, Urine 70 (1+) (*)     Glucose, Urine Normal      Blood, Urine 0.1 (1+) (*)     Ketones, Urine 10 (1+) (*)     Bilirubin, Urine NEGATIVE      Urobilinogen, Urine Normal      Nitrite, Urine NEGATIVE      Leukocyte Esterase, Urine 75 Kimber/uL (*)    MICROSCOPIC ONLY, URINE - Abnormal    WBC, Urine 6-10 (*)     RBC, Urine 3-5      Squamous Epithelial Cells, Urine 1-9 (SPARSE)      Bacteria, Urine 4+ (*)     Fine Granular Casts, Urine 1+ (*)    BLOOD CULTURE - Normal    Blood Culture Loaded on Instrument - Culture in progress     BLOOD CULTURE - Normal    Blood Culture Loaded on Instrument - Culture in progress     LACTATE - Normal    Lactate 1.8      Narrative:     Venipuncture immediately after or during the administration of Metamizole may lead to falsely low results. Testing should be performed immediately prior to Metamizole dosing.   URINE CULTURE   URINALYSIS WITH REFLEX CULTURE AND MICROSCOPIC    Narrative:     The following orders were created for panel order Urinalysis with Reflex Culture and Microscopic.  Procedure                               Abnormality         Status                     ---------                               -----------         ------                     Urinalysis with Reflex C...[[id number]]  Abnormal            Final result               Extra Urine Gray Tube[[id number]]                            In process                   Please view results for these tests on the individual orders.   EXTRA URINE GRAY TUBE   CBC WITH AUTO DIFFERENTIAL   COMPREHENSIVE METABOLIC PANEL   LACTATE     ED Course as of 03/12/25 2135   Wed Mar 12, 2025   1249 CT  abdomen pelvis w IV contrast  No gas noted on my CT read. [MC]   1327 Pulse Ox(!): 93 %  Low O2 saturations were from acrylics on nails, normal o2 read when rechecked. No complaints of cp or sob. [MC]   1342 Leukocyte Esterase, Urine(!): 75 Kimber/uL  Being covered with vanc/zosyn/clinda [MC]   1357 Spoke with Dr. Alfred gen surg at this time, recommended calling OB/GYN, if they end up taking her to the OR she would be happy to be consulted but given the area would like OB/GYN evaluation as well. [MC]   1400 Reperfusion exam was performed, brisk cap refill, 2+ PT DP pulses, heart regular rate and rhythm, lungs sound clear on exam. [MC]   1433 Spoke with Dr. Butler, with patient's previous history of malignancy in this area, recommended transfer to [facility] for gyn/onc, this may be a multidisciplinary surgery that she feels may require transfer.  Will contact transfer center. [MC]   1546 Obtain consent from patient and updated her  on current situation plan for transfer, currently awaiting call from gyn/onc [MC]   1818 Patient spiked a fever, was reassessed, 2+ PT DP pulses, cap refill 4 seconds, heart regular rate rhythm, lungs sound clear on examination, I did reexamine the mons pubis area, does not appear any different than previously, I did [place] the area with a purple marker and dated it in timed it. [MC]   2032 Patient reexamined, blood pressure 70s over 40s, MAP in the 50s, will bolus fluids, she denies any shortness of breath. [MC]   2033 Cap refill 3 seconds, symmetrical pulses, patient denies shortness of breath. [MC]   2044 Patient reassessed at this time, pulse ox monitored was attached to right earlobe, O2 sats in the high 80s, then mid 90s.  She denies any complaints of shortness of breath, no rhonchi on examination.  Blood pressures were 70s over 40s, she was given 250 cc fluid bolus, blood pressure improved to 97/46, will be bolused with remainder of fluids. [MC]   2057 Repeat blood  pressure 87/50, patient has 500 cc to go. []    Spoke with OB/GYN on-call here, will assess the patient however still recommended ED to ED transfer, if patient is septic and hypotensive she would not be stable enough for surgery in either case, may require admission for ICU.  Will speak with transfer center regarding whether we are able to transfer her tonight.  Waiting on callback from transfer center, they were notified. []    Spoke with Dr. Mckeon with OBGYN []      ED Course User Index  [] Alma Pastrana PA-C         Diagnoses as of 25   Groin abscess   Leukocytosis, unspecified type   Cellulitis, unspecified cellulitis site         Medical Decision Making & ED Course   Medical Decision Makin y.o. female presents today for pretty extensive abscess with cellulitis to the groin region, there is no obvious central fluctuance, there is some area of drainage most posterior, she has no crepitus, no dark discoloration however I do have concerns for severe and extensive infection that would likely require admission, discussed with pharmacy antibiotic selection, vancomycin and Zosyn ordered, CT abdomen pelvis was obtained to rule out free air and to evaluate for extent of possible abscess, basic labs blood cultures, lactate were obtained as well, patient was started on ideal body weight fluids totaling 1500 cc and was also provided with Dilaudid for pain.  Case discussed with Dr. Novak.    Patient was found to have an elevated white blood cell count of 33,000 with neutrophil shift, CMP showed a sodium of 130, urinalysis showed 75 leukocyte esterase with 6-10 white blood cells and 4+ bacteria, after seeing patient she was started on vancomycin Zosyn and clindamycin due to concerns for groin infection and possible necrotizing fasciitis.  CT did show developing abscesses however no free air.  Patient received 30 cc/kg of fluids, obtain blood cultures, lactate, she received antibiotics  as well as Dilaudid for pain, she did spike a fever while in the ED at 104, received Tylenol and Toradol with improvement in her temperature.  Spoke with general surgery here as well as OB/GYN here at [facility], OB/GYN Dr Butler felt that with patient's history of vulvar cancer and previous resections she would be better served by [facility], I spoke with Gyn Onc attending Dr Penaloza who agrees with hospital to hospital transfer.  Initial plan was for inpatient transfer however later patient developed fever and hypotension and we have concerns for sepsis to where she likely require surgery sooner than later.  she developed pressures in the 70s over 40s, I reassessed the patient at bedside, she had brisk 3-second cap refill, normal pulses, no rhonchi on examination with clear lung sounds.  We did bolus an additional liter of fluids with improvement in blood pressures.  Reached back out to the transfer center to push for ED to ED transfer given severity of infection and hemodynamic instability.  Patient will be signed out to incoming provider Yvrose Britt pending speaking with transfer center again with plan for ED to ED transfer.  ----      Differential diagnoses considered include but are not limited to: Labial abscess, cellulitis, necrotizing fasciitis, sepsis, bacteremia, etc.     Social Determinants of Health which Significantly Impact Care: None identified     EKG Independent Interpretation: EKG not obtained    Independent Result Review and Interpretation: Relevant laboratory and radiographic results were reviewed and independently interpreted by myself.  As necessary, they are commented on in the ED Course.    Chronic conditions affecting the patient's care: As documented above in MDM    The patient was discussed with the following consultants/services: See MDM    Care Considerations: As documented above in MDM    ED Course:  ED Course as of 03/12/25 2135   Wed Mar 12, 2025   1249 CT abdomen pelvis w IV  contrast  No gas noted on my CT read. [MC]   1327 Pulse Ox(!): 93 %  Low O2 saturations were from acrylics on nails, normal o2 read when rechecked. No complaints of cp or sob. [MC]   1342 Leukocyte Esterase, Urine(!): 75 Kimber/uL  Being covered with vanc/zosyn/clinda [MC]   1357 Spoke with Dr. Alfred gen surg at this time, recommended calling OB/GYN, if they end up taking her to the OR she would be happy to be consulted but given the area would like OB/GYN evaluation as well. [MC]   1400 Reperfusion exam was performed, brisk cap refill, 2+ PT DP pulses, heart regular rate and rhythm, lungs sound clear on exam. [MC]   1433 Spoke with Dr. Butler, with patient's previous history of malignancy in this area, recommended transfer to [facility] for gyn/onc, this may be a multidisciplinary surgery that she feels may require transfer.  Will contact transfer center. [MC]   1546 Obtain consent from patient and updated her  on current situation plan for transfer, currently awaiting call from gyn/onc [MC]   1818 Patient spiked a fever, was reassessed, 2+ PT DP pulses, cap refill 4 seconds, heart regular rate rhythm, lungs sound clear on examination, I did reexamine the mons pubis area, does not appear any different than previously, I did [place] the area with a purple marker and dated it in timed it. [MC]   2032 Patient reexamined, blood pressure 70s over 40s, MAP in the 50s, will bolus fluids, she denies any shortness of breath. [MC]   2033 Cap refill 3 seconds, symmetrical pulses, patient denies shortness of breath. [MC]   2044 Patient reassessed at this time, pulse ox monitored was attached to right earlobe, O2 sats in the high 80s, then mid 90s.  She denies any complaints of shortness of breath, no rhonchi on examination.  Blood pressures were 70s over 40s, she was given 250 cc fluid bolus, blood pressure improved to 97/46, will be bolused with remainder of fluids. [MC]   2057 Repeat blood pressure 87/50,  patient has 500 cc to go. []   2106 Spoke with OB/GYN on-call here, will assess the patient however still recommended ED to ED transfer, if patient is septic and hypotensive she would not be stable enough for surgery in either case, may require admission for ICU.  Will speak with transfer center regarding whether we are able to transfer her tonight.  Waiting on callback from transfer center, they were notified. []   2107 Spoke with Dr. Mckeon with OBGYN []      ED Course User Index  [MC] Alma Pastrana PA-C         Diagnoses as of 03/12/25 2135   Groin abscess   Leukocytosis, unspecified type   Cellulitis, unspecified cellulitis site     Disposition   As a result of their workup, the patient will require transfer to another facility.  The patient and/or her guardian/representative is agreeable to transfer at this time.   We will continue to monitor and manage the patient in the Emergency Department until transport for transfer can be arranged.    Procedures   Procedures    This was a shared visit with an ED attending.  The patient was seen and discussed with the ED attending    Alma Pastrana PA-C  Emergency Medicine       Alma Pastrana PA-C  03/12/25 2050       Alma Pastrana PA-C  03/12/25 2135

## 2025-03-13 PROBLEM — L02.215 PERINEAL ABSCESS: Status: ACTIVE | Noted: 2025-03-13

## 2025-03-13 LAB
ABO GROUP (TYPE) IN BLOOD: NORMAL
ALBUMIN SERPL BCP-MCNC: 2.7 G/DL (ref 3.4–5)
ALBUMIN SERPL BCP-MCNC: 3 G/DL (ref 3.4–5)
ALP SERPL-CCNC: 117 U/L (ref 33–136)
ALP SERPL-CCNC: 91 U/L (ref 33–136)
ALT SERPL W P-5'-P-CCNC: 10 U/L (ref 7–45)
ALT SERPL W P-5'-P-CCNC: 14 U/L (ref 7–45)
ANION GAP SERPL CALC-SCNC: 13 MMOL/L (ref 10–20)
ANION GAP SERPL CALC-SCNC: 13 MMOL/L (ref 10–20)
ANION GAP SERPL CALC-SCNC: 14 MMOL/L (ref 10–20)
ANTIBODY SCREEN: NORMAL
AST SERPL W P-5'-P-CCNC: 19 U/L (ref 9–39)
AST SERPL W P-5'-P-CCNC: 21 U/L (ref 9–39)
BACTERIA UR CULT: NORMAL
BASOPHILS # BLD AUTO: 0.16 X10*3/UL (ref 0–0.1)
BASOPHILS NFR BLD AUTO: 0.6 %
BILIRUB SERPL-MCNC: 0.7 MG/DL (ref 0–1.2)
BILIRUB SERPL-MCNC: 0.8 MG/DL (ref 0–1.2)
BUN SERPL-MCNC: 19 MG/DL (ref 6–23)
BUN SERPL-MCNC: 21 MG/DL (ref 6–23)
BUN SERPL-MCNC: 21 MG/DL (ref 6–23)
CALCIUM SERPL-MCNC: 7.9 MG/DL (ref 8.6–10.6)
CALCIUM SERPL-MCNC: 8.4 MG/DL (ref 8.6–10.6)
CALCIUM SERPL-MCNC: 8.8 MG/DL (ref 8.6–10.6)
CHLORIDE SERPL-SCNC: 94 MMOL/L (ref 98–107)
CHLORIDE SERPL-SCNC: 97 MMOL/L (ref 98–107)
CHLORIDE SERPL-SCNC: 99 MMOL/L (ref 98–107)
CO2 SERPL-SCNC: 24 MMOL/L (ref 21–32)
CO2 SERPL-SCNC: 26 MMOL/L (ref 21–32)
CO2 SERPL-SCNC: 26 MMOL/L (ref 21–32)
CREAT SERPL-MCNC: 0.87 MG/DL (ref 0.5–1.05)
CREAT SERPL-MCNC: 0.95 MG/DL (ref 0.5–1.05)
CREAT SERPL-MCNC: 0.97 MG/DL (ref 0.5–1.05)
EGFRCR SERPLBLD CKD-EPI 2021: 62 ML/MIN/1.73M*2
EGFRCR SERPLBLD CKD-EPI 2021: 64 ML/MIN/1.73M*2
EGFRCR SERPLBLD CKD-EPI 2021: 71 ML/MIN/1.73M*2
EOSINOPHIL # BLD AUTO: 0.08 X10*3/UL (ref 0–0.4)
EOSINOPHIL NFR BLD AUTO: 0.3 %
ERYTHROCYTE [DISTWIDTH] IN BLOOD BY AUTOMATED COUNT: 16.2 % (ref 11.5–14.5)
ERYTHROCYTE [DISTWIDTH] IN BLOOD BY AUTOMATED COUNT: 16.2 % (ref 11.5–14.5)
ERYTHROCYTE [DISTWIDTH] IN BLOOD BY AUTOMATED COUNT: 16.3 % (ref 11.5–14.5)
GLUCOSE SERPL-MCNC: 76 MG/DL (ref 74–99)
GLUCOSE SERPL-MCNC: 86 MG/DL (ref 74–99)
GLUCOSE SERPL-MCNC: 98 MG/DL (ref 74–99)
HCT VFR BLD AUTO: 28.9 % (ref 36–46)
HCT VFR BLD AUTO: 31.1 % (ref 36–46)
HCT VFR BLD AUTO: 31.9 % (ref 36–46)
HGB BLD-MCNC: 10.3 G/DL (ref 12–16)
HGB BLD-MCNC: 11 G/DL (ref 12–16)
HGB BLD-MCNC: 11.3 G/DL (ref 12–16)
HOLD SPECIMEN: NORMAL
HOLD SPECIMEN: NORMAL
IMM GRANULOCYTES # BLD AUTO: 0.28 X10*3/UL (ref 0–0.5)
IMM GRANULOCYTES NFR BLD AUTO: 1 % (ref 0–0.9)
LYMPHOCYTES # BLD AUTO: 1.83 X10*3/UL (ref 0.8–3)
LYMPHOCYTES NFR BLD AUTO: 6.8 %
MAGNESIUM SERPL-MCNC: 1.68 MG/DL (ref 1.6–2.4)
MAGNESIUM SERPL-MCNC: 1.87 MG/DL (ref 1.6–2.4)
MCH RBC QN AUTO: 28.9 PG (ref 26–34)
MCH RBC QN AUTO: 29.4 PG (ref 26–34)
MCH RBC QN AUTO: 29.4 PG (ref 26–34)
MCHC RBC AUTO-ENTMCNC: 35.4 G/DL (ref 32–36)
MCHC RBC AUTO-ENTMCNC: 35.4 G/DL (ref 32–36)
MCHC RBC AUTO-ENTMCNC: 35.6 G/DL (ref 32–36)
MCV RBC AUTO: 82 FL (ref 80–100)
MCV RBC AUTO: 83 FL (ref 80–100)
MCV RBC AUTO: 83 FL (ref 80–100)
MONOCYTES # BLD AUTO: 1.93 X10*3/UL (ref 0.05–0.8)
MONOCYTES NFR BLD AUTO: 7.2 %
NEUTROPHILS # BLD AUTO: 22.5 X10*3/UL (ref 1.6–5.5)
NEUTROPHILS NFR BLD AUTO: 84.1 %
NRBC BLD-RTO: 0 /100 WBCS (ref 0–0)
PLATELET # BLD AUTO: 165 X10*3/UL (ref 150–450)
PLATELET # BLD AUTO: 193 X10*3/UL (ref 150–450)
PLATELET # BLD AUTO: 199 X10*3/UL (ref 150–450)
POTASSIUM SERPL-SCNC: 3.1 MMOL/L (ref 3.5–5.3)
POTASSIUM SERPL-SCNC: 3.5 MMOL/L (ref 3.5–5.3)
POTASSIUM SERPL-SCNC: 3.8 MMOL/L (ref 3.5–5.3)
PROT SERPL-MCNC: 5.4 G/DL (ref 6.4–8.2)
PROT SERPL-MCNC: 5.7 G/DL (ref 6.4–8.2)
RBC # BLD AUTO: 3.5 X10*6/UL (ref 4–5.2)
RBC # BLD AUTO: 3.81 X10*6/UL (ref 4–5.2)
RBC # BLD AUTO: 3.84 X10*6/UL (ref 4–5.2)
RH FACTOR (ANTIGEN D): NORMAL
SODIUM SERPL-SCNC: 129 MMOL/L (ref 136–145)
SODIUM SERPL-SCNC: 132 MMOL/L (ref 136–145)
SODIUM SERPL-SCNC: 134 MMOL/L (ref 136–145)
WBC # BLD AUTO: 23.7 X10*3/UL (ref 4.4–11.3)
WBC # BLD AUTO: 26.8 X10*3/UL (ref 4.4–11.3)
WBC # BLD AUTO: 27.1 X10*3/UL (ref 4.4–11.3)

## 2025-03-13 PROCEDURE — 96365 THER/PROPH/DIAG IV INF INIT: CPT

## 2025-03-13 PROCEDURE — 36415 COLL VENOUS BLD VENIPUNCTURE: CPT

## 2025-03-13 PROCEDURE — 96367 TX/PROPH/DG ADDL SEQ IV INF: CPT

## 2025-03-13 PROCEDURE — 2500000001 HC RX 250 WO HCPCS SELF ADMINISTERED DRUGS (ALT 637 FOR MEDICARE OP)

## 2025-03-13 PROCEDURE — 80053 COMPREHEN METABOLIC PANEL: CPT

## 2025-03-13 PROCEDURE — 2500000004 HC RX 250 GENERAL PHARMACY W/ HCPCS (ALT 636 FOR OP/ED)

## 2025-03-13 PROCEDURE — 83735 ASSAY OF MAGNESIUM: CPT

## 2025-03-13 PROCEDURE — 2500000002 HC RX 250 W HCPCS SELF ADMINISTERED DRUGS (ALT 637 FOR MEDICARE OP, ALT 636 FOR OP/ED)

## 2025-03-13 PROCEDURE — 85027 COMPLETE CBC AUTOMATED: CPT

## 2025-03-13 PROCEDURE — 96366 THER/PROPH/DIAG IV INF ADDON: CPT

## 2025-03-13 PROCEDURE — 1200000003 HC ONCOLOGY  ROOM WITH TELEMETRY DAILY

## 2025-03-13 PROCEDURE — 85025 COMPLETE CBC W/AUTO DIFF WBC: CPT

## 2025-03-13 PROCEDURE — 86923 COMPATIBILITY TEST ELECTRIC: CPT

## 2025-03-13 PROCEDURE — 86901 BLOOD TYPING SEROLOGIC RH(D): CPT

## 2025-03-13 PROCEDURE — 80048 BASIC METABOLIC PNL TOTAL CA: CPT | Mod: CCI

## 2025-03-13 RX ORDER — FORMOTEROL FUMARATE 20 UG/2ML
20 SOLUTION RESPIRATORY (INHALATION)
Status: DISCONTINUED | OUTPATIENT
Start: 2025-03-13 | End: 2025-03-13

## 2025-03-13 RX ORDER — NITROGLYCERIN 0.4 MG/1
0.4 TABLET SUBLINGUAL EVERY 5 MIN PRN
Status: DISCONTINUED | OUTPATIENT
Start: 2025-03-13 | End: 2025-03-17 | Stop reason: HOSPADM

## 2025-03-13 RX ORDER — ATORVASTATIN CALCIUM 80 MG/1
80 TABLET, FILM COATED ORAL DAILY
Status: DISCONTINUED | OUTPATIENT
Start: 2025-03-13 | End: 2025-03-17 | Stop reason: HOSPADM

## 2025-03-13 RX ORDER — CELECOXIB 200 MG/1
200 CAPSULE ORAL DAILY PRN
Status: DISCONTINUED | OUTPATIENT
Start: 2025-03-13 | End: 2025-03-13 | Stop reason: DRUGHIGH

## 2025-03-13 RX ORDER — CELECOXIB 200 MG/1
200 CAPSULE ORAL DAILY PRN
Status: DISCONTINUED | OUTPATIENT
Start: 2025-03-13 | End: 2025-03-13

## 2025-03-13 RX ORDER — AMLODIPINE BESYLATE 5 MG/1
5 TABLET ORAL DAILY
Status: DISCONTINUED | OUTPATIENT
Start: 2025-03-13 | End: 2025-03-13

## 2025-03-13 RX ORDER — IBUPROFEN 600 MG/1
600 TABLET ORAL EVERY 6 HOURS PRN
Status: DISCONTINUED | OUTPATIENT
Start: 2025-03-13 | End: 2025-03-17 | Stop reason: HOSPADM

## 2025-03-13 RX ORDER — ONDANSETRON 4 MG/1
4 TABLET, FILM COATED ORAL EVERY 6 HOURS PRN
Status: DISCONTINUED | OUTPATIENT
Start: 2025-03-13 | End: 2025-03-17 | Stop reason: HOSPADM

## 2025-03-13 RX ORDER — FUROSEMIDE 20 MG/1
20 TABLET ORAL DAILY
Status: DISCONTINUED | OUTPATIENT
Start: 2025-03-13 | End: 2025-03-14

## 2025-03-13 RX ORDER — SODIUM CHLORIDE, SODIUM LACTATE, POTASSIUM CHLORIDE, CALCIUM CHLORIDE 600; 310; 30; 20 MG/100ML; MG/100ML; MG/100ML; MG/100ML
125 INJECTION, SOLUTION INTRAVENOUS CONTINUOUS
Status: DISCONTINUED | OUTPATIENT
Start: 2025-03-13 | End: 2025-03-13

## 2025-03-13 RX ORDER — ALPRAZOLAM 0.5 MG/1
0.5 TABLET ORAL 3 TIMES DAILY PRN
Status: DISCONTINUED | OUTPATIENT
Start: 2025-03-13 | End: 2025-03-14

## 2025-03-13 RX ORDER — FORMOTEROL FUMARATE 20 UG/2ML
20 SOLUTION RESPIRATORY (INHALATION)
Status: DISCONTINUED | OUTPATIENT
Start: 2025-03-13 | End: 2025-03-17 | Stop reason: HOSPADM

## 2025-03-13 RX ORDER — LANOLIN ALCOHOL/MO/W.PET/CERES
800 CREAM (GRAM) TOPICAL ONCE
Status: COMPLETED | OUTPATIENT
Start: 2025-03-13 | End: 2025-03-13

## 2025-03-13 RX ORDER — IBUPROFEN 400 MG/1
800 TABLET ORAL EVERY 6 HOURS PRN
Status: DISCONTINUED | OUTPATIENT
Start: 2025-03-13 | End: 2025-03-13

## 2025-03-13 RX ORDER — CLINDAMYCIN PHOSPHATE 900 MG/50ML
900 INJECTION, SOLUTION INTRAVENOUS EVERY 8 HOURS
Status: DISCONTINUED | OUTPATIENT
Start: 2025-03-13 | End: 2025-03-14

## 2025-03-13 RX ORDER — CLINDAMYCIN PHOSPHATE 900 MG/50ML
INJECTION, SOLUTION INTRAVENOUS
Status: COMPLETED
Start: 2025-03-13 | End: 2025-03-13

## 2025-03-13 RX ORDER — TRAMADOL HYDROCHLORIDE 50 MG/1
50 TABLET ORAL EVERY 8 HOURS PRN
Status: DISCONTINUED | OUTPATIENT
Start: 2025-03-13 | End: 2025-03-17 | Stop reason: HOSPADM

## 2025-03-13 RX ORDER — LISINOPRIL 20 MG/1
20 TABLET ORAL DAILY
Status: DISCONTINUED | OUTPATIENT
Start: 2025-03-13 | End: 2025-03-14

## 2025-03-13 RX ORDER — ASPIRIN 81 MG/1
81 TABLET ORAL DAILY
Status: DISCONTINUED | OUTPATIENT
Start: 2025-03-13 | End: 2025-03-13

## 2025-03-13 RX ORDER — HEPARIN SODIUM 5000 [USP'U]/ML
5000 INJECTION, SOLUTION INTRAVENOUS; SUBCUTANEOUS EVERY 8 HOURS
Status: DISCONTINUED | OUTPATIENT
Start: 2025-03-13 | End: 2025-03-17 | Stop reason: HOSPADM

## 2025-03-13 RX ORDER — POTASSIUM CHLORIDE 14.9 MG/ML
20 INJECTION INTRAVENOUS
Status: COMPLETED | OUTPATIENT
Start: 2025-03-13 | End: 2025-03-13

## 2025-03-13 RX ORDER — VENLAFAXINE HYDROCHLORIDE 37.5 MG/1
37.5 CAPSULE, EXTENDED RELEASE ORAL DAILY
Status: DISCONTINUED | OUTPATIENT
Start: 2025-03-13 | End: 2025-03-17 | Stop reason: HOSPADM

## 2025-03-13 RX ORDER — CARVEDILOL 25 MG/1
25 TABLET ORAL
Status: DISCONTINUED | OUTPATIENT
Start: 2025-03-13 | End: 2025-03-17 | Stop reason: HOSPADM

## 2025-03-13 RX ORDER — CYCLOBENZAPRINE HCL 10 MG
10 TABLET ORAL 3 TIMES DAILY PRN
Status: DISCONTINUED | OUTPATIENT
Start: 2025-03-13 | End: 2025-03-14

## 2025-03-13 RX ORDER — ALBUTEROL SULFATE 90 UG/1
2 INHALANT RESPIRATORY (INHALATION) 2 TIMES DAILY PRN
Status: DISCONTINUED | OUTPATIENT
Start: 2025-03-13 | End: 2025-03-17 | Stop reason: HOSPADM

## 2025-03-13 RX ORDER — OXYBUTYNIN CHLORIDE 5 MG/1
10 TABLET ORAL 2 TIMES DAILY
Status: DISCONTINUED | OUTPATIENT
Start: 2025-03-13 | End: 2025-03-17 | Stop reason: HOSPADM

## 2025-03-13 RX ORDER — PANTOPRAZOLE SODIUM 40 MG/1
40 TABLET, DELAYED RELEASE ORAL NIGHTLY
Status: DISCONTINUED | OUTPATIENT
Start: 2025-03-13 | End: 2025-03-17 | Stop reason: HOSPADM

## 2025-03-13 RX ORDER — VANCOMYCIN HYDROCHLORIDE 1 G/200ML
1000 INJECTION, SOLUTION INTRAVENOUS EVERY 24 HOURS
Status: DISCONTINUED | OUTPATIENT
Start: 2025-03-13 | End: 2025-03-15

## 2025-03-13 RX ORDER — ONDANSETRON HYDROCHLORIDE 2 MG/ML
4 INJECTION, SOLUTION INTRAVENOUS EVERY 6 HOURS PRN
Status: DISCONTINUED | OUTPATIENT
Start: 2025-03-13 | End: 2025-03-17 | Stop reason: HOSPADM

## 2025-03-13 RX ORDER — HEPARIN SODIUM 5000 [USP'U]/ML
5000 INJECTION, SOLUTION INTRAVENOUS; SUBCUTANEOUS EVERY 8 HOURS
Status: DISCONTINUED | OUTPATIENT
Start: 2025-03-13 | End: 2025-03-13

## 2025-03-13 RX ORDER — ASPIRIN 81 MG/1
81 TABLET ORAL DAILY
Status: DISCONTINUED | OUTPATIENT
Start: 2025-03-13 | End: 2025-03-14

## 2025-03-13 RX ORDER — AMLODIPINE BESYLATE 5 MG/1
5 TABLET ORAL DAILY
Status: DISCONTINUED | OUTPATIENT
Start: 2025-03-13 | End: 2025-03-14

## 2025-03-13 RX ADMIN — PIPERACILLIN SODIUM AND TAZOBACTAM SODIUM 3.38 G: 3; .375 INJECTION, SOLUTION INTRAVENOUS at 21:58

## 2025-03-13 RX ADMIN — PIPERACILLIN SODIUM AND TAZOBACTAM SODIUM 3.38 G: 3; .375 INJECTION, SOLUTION INTRAVENOUS at 14:09

## 2025-03-13 RX ADMIN — VANCOMYCIN HYDROCHLORIDE 1000 MG: 1 INJECTION, SOLUTION INTRAVENOUS at 11:40

## 2025-03-13 RX ADMIN — SODIUM CHLORIDE, POTASSIUM CHLORIDE, SODIUM LACTATE AND CALCIUM CHLORIDE 125 ML/HR: 600; 310; 30; 20 INJECTION, SOLUTION INTRAVENOUS at 03:33

## 2025-03-13 RX ADMIN — PIPERACILLIN SODIUM AND TAZOBACTAM SODIUM 3.38 G: 3; .375 INJECTION, SOLUTION INTRAVENOUS at 09:20

## 2025-03-13 RX ADMIN — OXYBUTYNIN CHLORIDE 10 MG: 5 TABLET ORAL at 21:58

## 2025-03-13 RX ADMIN — PIPERACILLIN SODIUM AND TAZOBACTAM SODIUM 3.38 G: 3; .375 INJECTION, SOLUTION INTRAVENOUS at 01:37

## 2025-03-13 RX ADMIN — CLINDAMYCIN PHOSPHATE 900 MG: 900 INJECTION, SOLUTION INTRAVENOUS at 10:44

## 2025-03-13 RX ADMIN — TRAMADOL HYDROCHLORIDE 50 MG: 50 TABLET, COATED ORAL at 14:08

## 2025-03-13 RX ADMIN — MAGNESIUM OXIDE TAB 400 MG (241.3 MG ELEMENTAL MG) 800 MG: 400 (241.3 MG) TAB at 04:31

## 2025-03-13 RX ADMIN — HEPARIN SODIUM 5000 UNITS: 5000 INJECTION, SOLUTION INTRAVENOUS; SUBCUTANEOUS at 09:20

## 2025-03-13 RX ADMIN — ATORVASTATIN CALCIUM 80 MG: 80 TABLET, FILM COATED ORAL at 09:20

## 2025-03-13 RX ADMIN — CLINDAMYCIN PHOSPHATE 900 MG: 900 INJECTION, SOLUTION INTRAVENOUS at 02:23

## 2025-03-13 RX ADMIN — POTASSIUM CHLORIDE 20 MEQ: 14.9 INJECTION, SOLUTION INTRAVENOUS at 04:32

## 2025-03-13 RX ADMIN — CLINDAMYCIN PHOSPHATE 900 MG: 900 INJECTION, SOLUTION INTRAVENOUS at 23:36

## 2025-03-13 RX ADMIN — TRAMADOL HYDROCHLORIDE 50 MG: 50 TABLET, COATED ORAL at 21:58

## 2025-03-13 RX ADMIN — ALPRAZOLAM 0.5 MG: 0.5 TABLET ORAL at 22:00

## 2025-03-13 RX ADMIN — VENLAFAXINE HYDROCHLORIDE 37.5 MG: 37.5 CAPSULE, EXTENDED RELEASE ORAL at 09:27

## 2025-03-13 RX ADMIN — IBUPROFEN 800 MG: 400 TABLET ORAL at 04:31

## 2025-03-13 RX ADMIN — PANTOPRAZOLE SODIUM 40 MG: 40 TABLET, DELAYED RELEASE ORAL at 21:58

## 2025-03-13 RX ADMIN — POTASSIUM CHLORIDE 20 MEQ: 14.9 INJECTION, SOLUTION INTRAVENOUS at 06:16

## 2025-03-13 RX ADMIN — OXYBUTYNIN CHLORIDE 10 MG: 5 TABLET ORAL at 02:23

## 2025-03-13 RX ADMIN — HEPARIN SODIUM 5000 UNITS: 5000 INJECTION, SOLUTION INTRAVENOUS; SUBCUTANEOUS at 18:55

## 2025-03-13 SDOH — SOCIAL STABILITY: SOCIAL INSECURITY: DO YOU FEEL UNSAFE GOING BACK TO THE PLACE WHERE YOU ARE LIVING?: NO

## 2025-03-13 SDOH — ECONOMIC STABILITY: HOUSING INSECURITY: AT ANY TIME IN THE PAST 12 MONTHS, WERE YOU HOMELESS OR LIVING IN A SHELTER (INCLUDING NOW)?: NO

## 2025-03-13 SDOH — SOCIAL STABILITY: SOCIAL INSECURITY: DOES ANYONE TRY TO KEEP YOU FROM HAVING/CONTACTING OTHER FRIENDS OR DOING THINGS OUTSIDE YOUR HOME?: NO

## 2025-03-13 SDOH — ECONOMIC STABILITY: HOUSING INSECURITY: IN THE LAST 12 MONTHS, WAS THERE A TIME WHEN YOU WERE NOT ABLE TO PAY THE MORTGAGE OR RENT ON TIME?: NO

## 2025-03-13 SDOH — SOCIAL STABILITY: SOCIAL INSECURITY: HAS ANYONE EVER THREATENED TO HURT YOUR FAMILY OR YOUR PETS?: NO

## 2025-03-13 SDOH — ECONOMIC STABILITY: FOOD INSECURITY: HOW HARD IS IT FOR YOU TO PAY FOR THE VERY BASICS LIKE FOOD, HOUSING, MEDICAL CARE, AND HEATING?: NOT HARD AT ALL

## 2025-03-13 SDOH — SOCIAL STABILITY: SOCIAL INSECURITY: DO YOU FEEL ANYONE HAS EXPLOITED OR TAKEN ADVANTAGE OF YOU FINANCIALLY OR OF YOUR PERSONAL PROPERTY?: NO

## 2025-03-13 SDOH — HEALTH STABILITY: PHYSICAL HEALTH: ON AVERAGE, HOW MANY MINUTES DO YOU ENGAGE IN EXERCISE AT THIS LEVEL?: 0 MIN

## 2025-03-13 SDOH — SOCIAL STABILITY: SOCIAL INSECURITY: WERE YOU ABLE TO COMPLETE ALL THE BEHAVIORAL HEALTH SCREENINGS?: YES

## 2025-03-13 SDOH — HEALTH STABILITY: PHYSICAL HEALTH: ON AVERAGE, HOW MANY DAYS PER WEEK DO YOU ENGAGE IN MODERATE TO STRENUOUS EXERCISE (LIKE A BRISK WALK)?: 0 DAYS

## 2025-03-13 SDOH — SOCIAL STABILITY: SOCIAL INSECURITY: HAVE YOU HAD THOUGHTS OF HARMING ANYONE ELSE?: NO

## 2025-03-13 SDOH — HEALTH STABILITY: PHYSICAL HEALTH
HOW OFTEN DO YOU NEED TO HAVE SOMEONE HELP YOU WHEN YOU READ INSTRUCTIONS, PAMPHLETS, OR OTHER WRITTEN MATERIAL FROM YOUR DOCTOR OR PHARMACY?: NEVER

## 2025-03-13 SDOH — SOCIAL STABILITY: SOCIAL INSECURITY: HAVE YOU HAD ANY THOUGHTS OF HARMING ANYONE ELSE?: NO

## 2025-03-13 SDOH — SOCIAL STABILITY: SOCIAL INSECURITY: ARE THERE ANY APPARENT SIGNS OF INJURIES/BEHAVIORS THAT COULD BE RELATED TO ABUSE/NEGLECT?: NO

## 2025-03-13 SDOH — SOCIAL STABILITY: SOCIAL INSECURITY: ARE YOU OR HAVE YOU BEEN THREATENED OR ABUSED PHYSICALLY, EMOTIONALLY, OR SEXUALLY BY ANYONE?: NO

## 2025-03-13 SDOH — SOCIAL STABILITY: SOCIAL INSECURITY: ABUSE: ADULT

## 2025-03-13 ASSESSMENT — COGNITIVE AND FUNCTIONAL STATUS - GENERAL
TOILETING: A LITTLE
HELP NEEDED FOR BATHING: A LITTLE
CLIMB 3 TO 5 STEPS WITH RAILING: A LITTLE
MOBILITY SCORE: 18
DRESSING REGULAR UPPER BODY CLOTHING: A LITTLE
STANDING UP FROM CHAIR USING ARMS: A LITTLE
WALKING IN HOSPITAL ROOM: A LITTLE
TURNING FROM BACK TO SIDE WHILE IN FLAT BAD: A LITTLE
DAILY ACTIVITIY SCORE: 18
DRESSING REGULAR LOWER BODY CLOTHING: A LITTLE
MOVING TO AND FROM BED TO CHAIR: A LITTLE
MOVING FROM LYING ON BACK TO SITTING ON SIDE OF FLAT BED WITH BEDRAILS: A LITTLE
PERSONAL GROOMING: A LITTLE
PATIENT BASELINE BEDBOUND: NO
EATING MEALS: A LITTLE

## 2025-03-13 ASSESSMENT — PAIN DESCRIPTION - PROGRESSION: CLINICAL_PROGRESSION: GRADUALLY IMPROVING

## 2025-03-13 ASSESSMENT — ACTIVITIES OF DAILY LIVING (ADL)
WALKS IN HOME: INDEPENDENT
DRESSING YOURSELF: INDEPENDENT
ADEQUATE_TO_COMPLETE_ADL: YES
JUDGMENT_ADEQUATE_SAFELY_COMPLETE_DAILY_ACTIVITIES: YES
TOILETING: INDEPENDENT
HEARING - RIGHT EAR: FUNCTIONAL
BATHING: INDEPENDENT
LACK_OF_TRANSPORTATION: NO
PATIENT'S MEMORY ADEQUATE TO SAFELY COMPLETE DAILY ACTIVITIES?: YES
HEARING - LEFT EAR: FUNCTIONAL
GROOMING: INDEPENDENT
FEEDING YOURSELF: INDEPENDENT

## 2025-03-13 ASSESSMENT — LIFESTYLE VARIABLES
SKIP TO QUESTIONS 9-10: 1
HOW MANY STANDARD DRINKS CONTAINING ALCOHOL DO YOU HAVE ON A TYPICAL DAY: PATIENT DOES NOT DRINK
AUDIT-C TOTAL SCORE: 0
SUBSTANCE_ABUSE_PAST_12_MONTHS: NO
AUDIT-C TOTAL SCORE: 0
HOW OFTEN DO YOU HAVE A DRINK CONTAINING ALCOHOL: NEVER
HOW OFTEN DO YOU HAVE 6 OR MORE DRINKS ON ONE OCCASION: NEVER
PRESCIPTION_ABUSE_PAST_12_MONTHS: NO

## 2025-03-13 ASSESSMENT — PAIN - FUNCTIONAL ASSESSMENT: PAIN_FUNCTIONAL_ASSESSMENT: 0-10

## 2025-03-13 ASSESSMENT — PAIN DESCRIPTION - LOCATION: LOCATION: VAGINA

## 2025-03-13 ASSESSMENT — PAIN SCALES - GENERAL
PAINLEVEL_OUTOF10: 3
PAINLEVEL_OUTOF10: 10 - WORST POSSIBLE PAIN

## 2025-03-13 NOTE — PROGRESS NOTES
Pharmacy Medication History Review    Edu Domingo is a 74 y.o. female admitted for Perineal abscess. Pharmacy reviewed the patient's [id number] medications and allergies for accuracy.    The list below reflects the updated PTA list.   Prior to Admission Medications   Prescriptions Last Dose Informant   ALPRAZolam (Xanax) 0.5 mg tablet Past Week Self   Sig: Take 1 tablet (0.5 mg) by mouth 3 times a day as needed for anxiety.   acetaminophen (TYLENOL ORAL) Past Week Self   Sig: Take 1 tablet by mouth once daily as needed.   albuterol 90 mcg/actuation inhaler Past Week Self   Sig: INHALE 2 PUFFS BY MOUTH AS INSTRUCTED EVERY 4 HOURS AS NEEDED FOR WHEEZING / SHORTNESS OF BREATH    No fill history patient says she is taking    amLODIPine (Norvasc) 5 mg tablet Past Week Self   Sig: Take 1 tablet (5 mg) by mouth once daily.   ammonium lactate (Lac-Hydrin) 12 % lotion Past Week Self   Sig: Apply topically if needed for dry skin.   aspirin 81 mg EC tablet Past Week Self   Sig: Take 1 tablet (81 mg) by mouth once daily.   atorvastatin (Lipitor) 80 mg tablet Past Week Self   Sig: Take 1 tablet (80 mg) by mouth once daily.   carvedilol (Coreg) 25 mg tablet Past Week Self   Sig: Take 1 tablet (25 mg) by mouth 2 times daily (morning and late afternoon).   celecoxib (CeleBREX) 200 mg capsule Past Week Self   Sig: Take 1 capsule (200 mg) by mouth once daily as needed for moderate pain (4 - 6).   cetirizine HCl (ZYRTEC ORAL) Past Week Self   Sig: Take 1 capsule by mouth once daily as needed.   cholecalciferol (Vitamin D-3) 50 MCG (2000 UT) tablet Past Week Self   Sig: Take 1 tablet by mouth daily    doxycycline (Monodox) 100 mg capsule 3/11/2025 Self   Sig: Take 1 capsule (100 mg) by mouth 2 times a day for 10 days. Take with at least 8 ounces (large glass) of water, do not lie down for 30 minutes after   furosemide (Lasix) 20 mg tablet Past Month Self   Sig: Take 1 tablet (20 mg) by mouth once daily.   lisinopril 20 mg  tablet Past Week Self   Sig: Take 1 tablet (20 mg) by mouth once daily.     Patient only taking 1/2 tablet (10 mg). Patient said she is doing this by herself because already on other blood pressure medications    nitroglycerin (Nitrostat) 0.4 mg SL tablet Past Week Self   Sig: Place 1 tablet (0.4 mg) under the tongue every 5 minutes if needed (angina). Times three PRN   oxybutynin XL (Ditropan-XL) 10 mg 24 hr tablet Past Week Self   Sig: Take 1 tablet (10 mg) by mouth once daily. Do not crush, chew, or split.   oxygen (O2) gas therapy  Self   Sig: at bedtime   traMADol (Ultram) 50 mg tablet 3/11/2025 Self   Sig: Take 1 tablet (50 mg) by mouth every 8 hours if needed for severe pain (7 - 10) for up to 5 days.   venlafaxine XR (Effexor-XR) 37.5 mg 24 hr capsule Past Week Self   Sig: Take 1 capsule (37.5 mg) by mouth once daily. Do not crush or chew.      Facility-Administered Medications: None        The list below reflects the updated allergy list. Please review each documented allergy for additional clarification and justification.  Allergies  Reviewed by Elmira Garcia RN on 3/12/2025        Severity Reactions Comments    Morphine Not Specified Unknown agitation    Myrbetriq [mirabegron] Not Specified Other Elevated blood pressure.    Oxycodone-acetaminophen Not Specified Unknown agitation    Sulfa (sulfonamide Antibiotics) Not Specified Unknown             Patient declines M2B at discharge. Pharmacy has been updated to [facility].    Sources used to complete the med history include:    [facility]  Pharmacy dispense history  Patient interview Good historian  Chart Review  Care Everywhere   3/11/25 office visit obstetrics gynecology Nicky Rodriguez, APRN-CNP     Below are additional concerns with the patient's PTA list.  none    Medications ADDED:  None   Medications CHANGED:  None   Medications REMOVED:   None     Isac Holliday oscar.   Transitions of Care Pharmacist  [facility]  Please reach out via Secure Chat for questions, or if no response call [employer] or [facility]

## 2025-03-13 NOTE — PROGRESS NOTES
Incoming ED to ED transfer from [facility]   Initially planned for transfer to GYN oncology service at [facility] due to history of vulvar cancer and now presenting with groin infection  However during the course of her ED stay her blood pressure has been decreasing.  She has received 30 cc per cake IV fluids and is continuing to receive more now.  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  No gas on CT.  Normal lactate initially on labs.  Will repeat now.  Plan for medical stabilization at [facility] and then transfer to [facility]

## 2025-03-13 NOTE — PROGRESS NOTES
"Edu Domingo is a 74 y.o. female on day 0 of admission presenting with Perineal abscess.    Subjective   Pt feeling overall well, moderate pain. Feels tired but hungry. Denies fevers or chills.        Objective     General: no acute distress  HEENT: normocephalic, atraumatic  Heart: warm and well perfused, RRR  Lungs: no increased WOB on 2LNC, mild rhonchi bilaterally   Abd: soft, nontender  : L labia erythematous, indurated extending from mons toward posterior fourchette, 2cm crusted lesion at base of erythema. No areas of fluctuance or crepitus noted. Appropriately tender to palpation. Area of erythema receding from previously outlined margins.   Extremities: moving all extremities  Neuro: awake and conversant  Psych: appropriate mood and affect     Last Recorded Vitals  Blood pressure 125/59, pulse 93, temperature 37.2 °C (99 °F), temperature source Oral, resp. rate 18, height 1.651 m (5' 5\"), weight 50.8 kg (112 lb), SpO2 94%.  Intake/Output last 3 Shifts:  No intake/output data recorded.    Relevant Results  Lab Results   Component Value Date    WBC 23.7 (H) 03/13/2025    HGB 10.3 (L) 03/13/2025    HCT 28.9 (L) 03/13/2025    MCV 83 03/13/2025     03/13/2025        Lab Results   Component Value Date    GLUCOSE 86 03/13/2025    CALCIUM 7.9 (L) 03/13/2025     (L) 03/13/2025    K 3.1 (L) 03/13/2025    CO2 24 03/13/2025    CL 99 03/13/2025    BUN 21 03/13/2025    CREATININE 0.95 03/13/2025           Assessment/Plan   Assessment & Plan  Perineal abscess     74 y.o. F with VIN3 s/p WLE in 2021 presenting as transfer for vulvar abscess vs cellulitis     L Labial Cellulitis, Developing Abscess  - CT with inflammatory changes in L perineum suggestive of early abscess formation, no evidence of gas  - significant drainage at home, no further drainage or areas of fluctuance on arrival. Pain appropriate, no signs of necrotizing fasciitis since arrival.   - Vanc/Zosyn/Clinda initiated at OSH, continue (3/12 - " )  - VSS on arrival, WBC improving to 23.7. Exam improving from prior marked area.   - Pain control with tylenol, ibuprofen, tramadol PRN.   - Given overall clinical improvement on IV abx and absence of areas to drain, plan to defer I&D and continue management with IV abx.      O2 Requirement   - hx smoking, COPD, hx lobectomy. Reports PRN O2 use at home  - 2LNC on arrival, wean as able     Medical Co-morbidities  - Hypertension- cont home carvedilol. Hold home Lisinopril, Norvasc, Furosemide   - OAB- Oxybutynin cont  - CAD s/p RCA PCI- Nitroglycerin PRN cont, ASA held   - H/o lung CA, COPD- daily inhaler cont, albuterol PRN  - H/o TIA- no meds   - H/o nonsustained VT- telemetry   - Depression/Anxiety- Effexor, Xanax cont  - HLD- Lipitor cont     Seen and d/w Dr. Carina Cherry MD  PGY3, Gynecologic Oncology  Pager [phone]

## 2025-03-13 NOTE — ED PROVIDER NOTES
HPI   No chief complaint on file.      HPI  Patient is a 74-year-old female with a past medical history of right wide excision of anterior vulvar/thigh lesion, right wide excision of the anal lesion and right thigh punch biopsy for DAMIEN 3, lung adenocarcinoma, HTN, GERD, HLD, squamous cell skin cancer, CAD with stents, CKD who presents to the emergency department as a transfer from [facility] for further evaluation of a vulvar abscess.  Patient states that she first noticed pain on her left upper leg/groin region last week but was not able to get into to see anybody until yesterday.  She states that she was seen by OB/GYN yesterday where she was recommended to come into the emergency department for pain control and IV antibiotics.  She states that the redness and swelling seem to progress from her upper leg through her entire groin region, worse on the left side over the past couple days.  Patient states that his difficulty with walk because of the pain, but denies any other systemic symptoms including chest pain, shortness of breath, nausea or vomiting, abdominal pain, changes in urination or bowel habits.  Patient does state that she is not urinated much today, but has also not been eating today as she has been in the hospital.  Patient had scans done outside hospital show concern for abscess formation and OB/GYN was consulted, but patient became hypotensive and required fluid boluses to maintain her blood pressure.  He was also deemed that she needed to be [facility] for continued care.      Patient History   Past Medical History:   Diagnosis Date    Anxiety     Anxiety disorder, unspecified     Anxiety    Calculus of kidney     Bilateral kidney stones    Cataracts, bilateral     Chronic kidney disease     stage 3    COPD (chronic obstructive pulmonary disease) (Multi)     Coronary artery disease     Hypertension     Lung cancer (Multi)     Major depressive disorder, recurrent, in partial  remission (CMS-HCC) 09/21/2021    Recurrent major depressive disorder, in partial remission    Nephrolithiasis     Old myocardial infarction     History of myocardial infarction    Other conditions influencing health status     Coronary Artery Disease    Other transient cerebral ischemic attacks and related syndromes     Subclavian steal syndrome    Personal history of other diseases of the circulatory system     History of hypertension    Personal history of other malignant neoplasm of bronchus and lung     Personal history of malignant neoplasm of lung    Personal history of other specified conditions 01/10/2019    History of abdominal pain    Skin cancer     squamous cell carcinoma     Past Surgical History:   Procedure Laterality Date    CT ANGIO NECK  11/26/2021    CT NECK ANGIO W AND WO IV CONTRAST 11/26/2021 [facility] CLINICAL LEGACY    CT ANGIO NECK  11/23/2022    CT NECK ANGIO W AND WO IV CONTRAST 11/23/2022 [facility] ANCILLARY LEGACY    CT HEAD ANGIO W AND WO IV CONTRAST  11/26/2021    CT HEAD ANGIO W AND WO IV CONTRAST 11/26/2021 [facility] CLINICAL LEGACY    LUNG LOBECTOMY  04/22/2014    Lung Lobectomy    OTHER SURGICAL HISTORY  08/20/2013    Cath Stent 1 Type Drug-Eluting    OTHER SURGICAL HISTORY  08/20/2013    Cardiac Cath Procedure Outcome: Successful    OTHER SURGICAL HISTORY  04/22/2014    Destruction Of Malignant Lesion     Family History   Problem Relation Name Age of Onset    Heart disease Mother      Rectal cancer Mother      Other (cardiac disorder) Mother      Breast cancer Mother  30    Prostate cancer Father      Diabetes Maternal Grandmother      Diabetes Maternal Grandfather      Ovarian cancer Mother's Sister      Breast cancer Mother's Sister      Cancer Cousin       Social History     Tobacco Use    Smoking status: Every Day     Current packs/day: 0.50     Average packs/day: 0.5 packs/day for 53.2 years (26.6 ttl pk-yrs)     Types: Cigarettes     Start date: 1970    Smokeless tobacco: Never   Vaping  Use    Vaping status: Never Used   Substance Use Topics    Alcohol use: Not Currently    Drug use: Not Currently       Physical Exam   ED Triage Vitals [03/12/25 2328]   Temperature Heart Rate Respirations BP   36.4 °C (97.5 °F) 76 18 119/51      Pulse Ox Temp Source Heart Rate Source Patient Position   98 % Oral Monitor Sitting      BP Location FiO2 (%)     Right arm --       Physical Exam  Vitals and nursing note reviewed. Exam conducted with a chaperone present.   Constitutional:       General: She is not in acute distress.     Appearance: She is well-developed.   HENT:      Head: Normocephalic and atraumatic.   Eyes:      Conjunctiva/sclera: Conjunctivae normal.   Cardiovascular:      Rate and Rhythm: Normal rate and regular rhythm.      Pulses: Normal pulses.      Heart sounds: No murmur heard.  Pulmonary:      Effort: Pulmonary effort is normal. No respiratory distress.      Breath sounds: Normal breath sounds.      Comments: Lungs clear to auscultation bilaterally  Abdominal:      Palpations: Abdomen is soft.      Tenderness: There is no abdominal tenderness.   Genitourinary:     Comments: Large area of erythema spreading throughout groin region into the left upper thigh with area of tender edema worse on left groin region.  Musculoskeletal:         General: No swelling.      Cervical back: Neck supple.      Right lower leg: No edema.      Left lower leg: No edema.   Skin:     General: Skin is warm and dry.      Capillary Refill: Capillary refill takes less than 2 seconds.   Neurological:      Mental Status: She is alert.   Psychiatric:         Mood and Affect: Mood normal.       ED Course & MDM   Diagnoses as of 03/13/25 0132   Perineal abscess     Medical Decision Making  Patient is a 74-year-old female with a past medical history of right wide excision of anterior vulvar/thigh lesion, right wide excision of the anal lesion and right thigh punch biopsy for DAMIEN 3, lung adenocarcinoma, HTN, GERD, HLD,  squamous cell skin cancer, CAD with stents, CKD who presents to the emergency department as a transfer from [facility] for further evaluation of a vulvar abscess.  Patient's vitals were stable and within normal is upon presentation.  Patient had received fluids at the outside hospital to maintain adequate blood pressure, and blood pressure was 119/51 upon presentation.  OB/GYN was consulted upon patient's presentation.  Vancomycin, clindamycin, and Zosyn were all restarted on schedule based on when they are given at the outside hospital.  Patient CBC showed a leukocytosis with WBC of 26.8, which is down from 3.6 or 10 hours prior.  Patient's hemoglobin was measured at 11.0 down from 13.0.  Patient CMP showed a slight hyponatremia with a sodium of 132, no sign of MAE or elevated liver enzymes.  Patient's glucose was measured at 76.  Patient admitted to Gyn-Onc for continued treament under Dr. Lim in stable condition.  Patient understood and was agreeable with the plan.    Procedure  Procedures none     True Parr DO  Resident  03/13/25 [id number]

## 2025-03-13 NOTE — ED TRIAGE NOTES
Pt BIB EMS from [facility]. pt has vulvular abscess with redness and swelling, cellulitis to the upper lower extremities, pt A&Ox4 and presents on 2LNC

## 2025-03-13 NOTE — CONSULTS
Vancomycin Dosing by Pharmacy- INITIAL    Edu Domingo is a 74 y.o. year old female who Pharmacy has been consulted for vancomycin dosing for cellulitis, skin and soft tissue. Based on the patient's indication and renal status this patient will be dosed based on a goal AUC of 400-600.     Renal function is currently stable.    Visit Vitals  BP 87/50   Pulse 78   Temp 38 °C (100.4 °F) (Temporal)   Resp 16        Lab Results   Component Value Date    CREATININE 0.84 2025    CREATININE 1.06 (H) 2025    CREATININE 0.82 2024    CREATININE 1.14 (H) 2024        Patient weight is as follows:   Vitals:    25 1021   Weight: 50.8 kg (112 lb)       Cultures:  No results found for the encounter in last 14 days.        No intake/output data recorded.  I/O during current shift:  No intake/output data recorded.    Temp (24hrs), Av.2 °C (100.8 °F), Min:36.9 °C (98.4 °F), Max:40 °C (104 °F)         Assessment/Plan     Patient has already been given a loading dose of 1000 mg.  Will initiate vancomycin maintenance,  1000 mg every 24 hours.    This dosing regimen is predicted by [employer] to result in the following pharmacokinetic parameters:  Loading dose: 1000mg x 1  Regimen: 1000 mg IV every 24 hours.  Start time: 11:46 on 2025  Exposure target: AUC24 (range)400-600 mg/L.hr   PYH11-73: 409 mg/L.hr  AUC24,ss: 445 mg/L.hr  Probability of AUC24 > 400: 62 %  Ctrough,ss: 12.6 mg/L  Probability of Ctrough,ss > 20: 13 %      Follow-up level will be ordered on 3/14 at 0500 unless clinically indicated sooner.  Will continue to monitor renal function daily while on vancomycin and order serum creatinine at least every 48 hours if not already ordered.  Follow for continued vancomycin needs, clinical response, and signs/symptoms of toxicity.       Jose Alberto Rojas, JhonyD

## 2025-03-13 NOTE — H&P
Gynecology- Oncology History & Physical     History Of Present Illness  Edu Domingo is a 74 y.o. female who is s/p R wide excision of anterior vulvar/thigh lesion, R wide excision of perianal lesion, and R thigh punch biopsy on 6/2/2021 for VIN3 presenting to the ED from [place] as a direct admission for vulvar abscess.     At the end of December 2024, patient noticed a lump on her left labia that was increasing in size and had started draining purulent fluid. Was initially prescribed Doxycycline with imrovement and resolution of pain and drainage. On 3/7, she noticed recurrence of abscess with copious drainage, soaking through a perineal pad. She was seen in the office 3/11 where she was encouraged to go to ED for pain meds and IV ABX but declined at that time. Was sent home with a PO ABX course. She subsequently presented to [place] on 3/12 due to increased swelling and pain. Was started on IV ABX and subsequently transferred to [facility] for further evaluation and management.     Patient currently denies pain while lying in bed, but reports exacerbation with standing and walking. States that the wound drainage drastically decreased after starting oral antibiotics on 3/11 but she can feel some moisture expressed while walking. She is without CP currently, but did have to use her prescribed Nitroglycerin over the weekend for chest tightness. Additionally denies SOB or N/V. Reports occasional supplemental O2 use at home.       Dysplasia history:   6/2/2021:  WLE of the right vulva,  excision of right thigh lesion, WLE of perianal lesion and right thigh punch biopsy- Dr. Bowman  -Final pathology.    A.  RIGHT THIGH,  BIOPSY:  -- SKIN WITH HIGH-GRADE SQUAMOUS INTRAEPITHELIAL LESION     B.  POSTERIOR MARGIN, EXCISION:  -- FRAGMENT OF SKIN WITH NO SIGNIFICANT PATHOLOGICAL FINDINGS     C.  RIGHT ANTERIOR VULVA, WIDE EXCISION:  -- HIGH-GRADE SQUAMOUS INTRAEPITHELIAL LESION (VIN3), SEE NOTE    Note: The VIN3 is less than  1 mm from the anterior medial resection margin and  3 mm from the posterior margin. No invasion is identified.     D.  RIGHT PERIANAL TISSUE, WIDE EXCISION:    -- HIGH-GRADE SQUAMOUS INTRAEPITHELIAL LESION (VIN3), SEE NOTE    Note: The VIN3 is present at the cauterized resection edge. No invasion is identified.       21 R vulvar biopsy:  DAMIEN-3 with HPV cytopathic effect.     1/2 PPD smoker x 30 years      PMH:  Hypertension, overactive bladder, hypercholesterolemia, anxiety. History of lung cancer in  (underwent LL lobectomy followed by chemo)- per patient they did not think lung was the primary. History of skin cancer on legs.     PSH: Left lower lobectomy , right arm ganglion cyst removal, lesions excised on legs for skin cancer      Family history: Mother with rectal cancer, father with pancreatic cancer, maternal aunt breast, maternal aunt cervix cancer       Past Medical History  She has a past medical history of Anxiety, Anxiety disorder, unspecified, Calculus of kidney, Cataracts, bilateral, Chronic kidney disease, COPD (chronic obstructive pulmonary disease) (Multi), Coronary artery disease, Hypertension, Lung cancer (Multi), Major depressive disorder, recurrent, in partial remission (CMS-HCC) (2021), Nephrolithiasis, Old myocardial infarction, Other conditions influencing health status, Other transient cerebral ischemic attacks and related syndromes, Personal history of other diseases of the circulatory system, Personal history of other malignant neoplasm of bronchus and lung, Personal history of other specified conditions (01/10/2019), and Skin cancer.    Surgical History  She has a past surgical history that includes Other surgical history (2013); Other surgical history (2013); Lung lobectomy (2014); Other surgical history (2014); CT angio head w and wo IV contrast (2021); CT angio neck (2021); and CT angio neck (2022).    OB/GYN History  ,  " x2. No abnormal paps last in 2018. Menopause in .     Oncology History    No history exists.       Social History  She reports that she has been smoking cigarettes. She started smoking about 53 years ago. She has a 26.6 pack-year smoking history. She has never used smokeless tobacco. She reports that she does not currently use alcohol. She reports that she does not currently use drugs.     Allergies  Morphine, Myrbetriq [mirabegron], Oxycodone-acetaminophen, and Sulfa (sulfonamide antibiotics)    Review of Systems  Negative except per HPI      Physical Exam    General: no acute distress, well-appearing   HEENT: normocephalic, atraumatic  CV: warm and well perfused  Lungs: breathing comfortably on 2L NC  Abdomen: NTND  Pelvic: Left labia majora edematous, erythematous, and warm. No obvious fluctuance of crepitus. Indurated. Erythema and edema track up to left side of mons superior to the pubic bone within the previously outlined margins. Approximately 2cm lesion to left lower labia, not currently draining.   Extremities: moving all extremities spontaneously  Neuro: awake and conversant  Psych: appropriate mood and affect       Last Recorded Vitals  Blood pressure 96/62, pulse 72, temperature 36.4 °C (97.5 °F), temperature source Oral, resp. rate 16, height 1.651 m (5' 5\"), weight 50.8 kg (112 lb), SpO2 96%.    Relevant Results  Results for orders placed or performed during the hospital encounter of 25 (from the past 24 hours)   CBC and Auto Differential   Result Value Ref Range    WBC 26.8 (H) 4.4 - 11.3 x10*3/uL    nRBC 0.0 0.0 - 0.0 /100 WBCs    RBC 3.81 (L) 4.00 - 5.20 x10*6/uL    Hemoglobin 11.0 (L) 12.0 - 16.0 g/dL    Hematocrit 31.1 (L) 36.0 - 46.0 %    MCV 82 80 - 100 fL    MCH 28.9 26.0 - 34.0 pg    MCHC 35.4 32.0 - 36.0 g/dL    RDW 16.3 (H) 11.5 - 14.5 %    Platelets 193 150 - 450 x10*3/uL    Neutrophils % 84.1 40.0 - 80.0 %    Immature Granulocytes %, Automated 1.0 (H) 0.0 - 0.9 %    " Lymphocytes % 6.8 13.0 - 44.0 %    Monocytes % 7.2 2.0 - 10.0 %    Eosinophils % 0.3 0.0 - 6.0 %    Basophils % 0.6 0.0 - 2.0 %    Neutrophils Absolute 22.50 (H) 1.60 - 5.50 x10*3/uL    Immature Granulocytes Absolute, Automated 0.28 0.00 - 0.50 x10*3/uL    Lymphocytes Absolute 1.83 0.80 - 3.00 x10*3/uL    Monocytes Absolute 1.93 (H) 0.05 - 0.80 x10*3/uL    Eosinophils Absolute 0.08 0.00 - 0.40 x10*3/uL    Basophils Absolute 0.16 (H) 0.00 - 0.10 x10*3/uL   Comprehensive metabolic panel   Result Value Ref Range    Glucose 76 74 - 99 mg/dL    Sodium 132 (L) 136 - 145 mmol/L    Potassium 3.8 3.5 - 5.3 mmol/L    Chloride 97 (L) 98 - 107 mmol/L    Bicarbonate 26 21 - 32 mmol/L    Anion Gap 13 10 - 20 mmol/L    Urea Nitrogen 21 6 - 23 mg/dL    Creatinine 0.97 0.50 - 1.05 mg/dL    eGFR 62 >60 mL/min/1.73m*2    Calcium 8.4 (L) 8.6 - 10.6 mg/dL    Albumin 2.7 (L) 3.4 - 5.0 g/dL    Alkaline Phosphatase 91 33 - 136 U/L    Total Protein 5.4 (L) 6.4 - 8.2 g/dL    AST 21 9 - 39 U/L    Bilirubin, Total 0.7 0.0 - 1.2 mg/dL    ALT 10 7 - 45 U/L   CBC   Result Value Ref Range    WBC 23.7 (H) 4.4 - 11.3 x10*3/uL    nRBC 0.0 0.0 - 0.0 /100 WBCs    RBC 3.50 (L) 4.00 - 5.20 x10*6/uL    Hemoglobin 10.3 (L) 12.0 - 16.0 g/dL    Hematocrit 28.9 (L) 36.0 - 46.0 %    MCV 83 80 - 100 fL    MCH 29.4 26.0 - 34.0 pg    MCHC 35.6 32.0 - 36.0 g/dL    RDW 16.2 (H) 11.5 - 14.5 %    Platelets 165 150 - 450 x10*3/uL   Basic metabolic panel   Result Value Ref Range    Glucose 86 74 - 99 mg/dL    Sodium 134 (L) 136 - 145 mmol/L    Potassium 3.1 (L) 3.5 - 5.3 mmol/L    Chloride 99 98 - 107 mmol/L    Bicarbonate 24 21 - 32 mmol/L    Anion Gap 14 10 - 20 mmol/L    Urea Nitrogen 21 6 - 23 mg/dL    Creatinine 0.95 0.50 - 1.05 mg/dL    eGFR 64 >60 mL/min/1.73m*2    Calcium 7.9 (L) 8.6 - 10.6 mg/dL   Magnesium   Result Value Ref Range    Magnesium 1.68 1.60 - 2.40 mg/dL      CT Abdomen/Pelvis (3/12)  IMPRESSION:  Inflammatory change in the left perineum with  findings suggestive of  early abscess formation.  There is no definite gas bubbles at this  time.  Distended gallbladder.  Nonobstructing right renal calculi.       Assessment/Plan   Assessment & Plan  Perineal abscess    Edu R Jose L is a 74 y.o. female who is s/p R wide excision of anterior vulvar/thigh lesion, R wide excision of perianal lesion, and R thigh punch biopsy on 6/2/2021 for VIN3 presenting to the ED from [place] as a direct admission for vulvar abscess.     L Labial Cellulitis, Developing Abscess  - VSS, WBC count of 23.7, down-trending from 33.6 on 3/12  - Exam findings as above, no obvious areas to drain on exam   - Low concern for necrotizing fascitis on exam at this time  - Imaging reviewed, no obvious pocket or gas formation   - Second recurrence of abscess, A1c last 8/2024 5.9 and HIV negative, has extensive smoking history  - Admit for continuation of IV antibiotics; will plan for I&D pending OR and staff availability. Patient amenable, consents signed.   - Continue Vanc/Zosyn/Clinda (initiated at OSH)  - NPO. For mIVF @ 125cc/hr   - Continue pain control     O2 Requirement   - Patient with smoking history, COPD, and h/o lobectomy   - Reports using supplemental O2 occasionally at home   - Currently requiring 2L NC to maintain O2 saturations   - Continue to monitor     Medical Co-morbidities  - Hypertension- Lisinopril, Carvedilol, Norvasc, Furosemide HELD (in s/o low normal BP)   - OAB- Oxybutynin con't  - CAD s/p RCA PCI- Nitroglycerin PRN con't, ASA held   - H/o lung CA, COPD- daily inhaler con't, albuterol PRN  - H/o TIA- no meds   - H/o nonsustained VT- telemetry   - Depression/Anxiety- Effexor, Xanax con't  - HLD- Lipitor con't      D/w Dr. Guzman, to be formally staffed with Dr. Penaloza in AM,     CJohnson Heath MD  PGY-II, Obstetrics & Gynecology   [facility]

## 2025-03-13 NOTE — H&P
History Of Present Illness  Edu Domingo is a 74 y.o. female with multiple medical comorbidities (COPD, lung cancer, HTN, skin cancer, CAD, OAB, GERD, OA) with a history of VIN3 presenting today with fever and labial swelling. Transfer in process for main campus. Currently on Vanc/Zosyn/Clinda. Came down to assess due to concerns of hypotension and worsening clinical status.     Patient was seen in the office yesterday by Nicky Rodriguez NP and instructed to go to the ER but declined, was sent home with 10 day course of PO abx. She has a history of VIN3 for which she underwent the below procedure. She did have a vulvar abscess in 10/2024 at which time she was prescribed oral abx. 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 She also has a history of adenocarcinoma of the lung s/p lobectomy in 2014.     She currently feels well and is in no pain. She is feeling hungry. No n/v. Has purewick in place. Has felt the swelling since Friday, reports an area of weeping but no obvious pus.     Dysplasia history:   6/2/2021:  WLE of the right vulva,  excision of right thigh lesion, WLE of perianal lesion and right thigh punch biopsy- Dr. Bowman  -Final pathology.    A.  RIGHT THIGH,  BIOPSY:  -- SKIN WITH HIGH-GRADE SQUAMOUS INTRAEPITHELIAL LESION     B.  POSTERIOR MARGIN, EXCISION:  -- FRAGMENT OF SKIN WITH NO SIGNIFICANT PATHOLOGICAL FINDINGS     C.  RIGHT ANTERIOR VULVA, WIDE EXCISION:  -- HIGH-GRADE SQUAMOUS INTRAEPITHELIAL LESION (VIN3), SEE NOTE    Note: The VIN3 is less than 1 mm from the anterior medial resection margin and  3 mm from the posterior margin. No invasion is identified.     D.  RIGHT PERIANAL TISSUE, WIDE EXCISION:    -- HIGH-GRADE SQUAMOUS INTRAEPITHELIAL LESION (VIN3), SEE NOTE    Note: The VIN3 is present at the cauterized resection edge. No invasion is identified.      Past  Medical History  She has a past medical history of Anxiety, Anxiety disorder, unspecified, Calculus of kidney, Cataracts, bilateral, Chronic kidney disease, COPD (chronic obstructive pulmonary disease) (Multi), Coronary artery disease, Hypertension, Lung cancer (Multi), Major depressive disorder, recurrent, in partial remission (CMS-HCC) (2021), Nephrolithiasis, Old myocardial infarction, Other conditions influencing health status, Other transient cerebral ischemic attacks and related syndromes, Personal history of other diseases of the circulatory system, Personal history of other malignant neoplasm of bronchus and lung, Personal history of other specified conditions (01/10/2019), and Skin cancer.    Surgical History  She has a past surgical history that includes Other surgical history (2013); Other surgical history (2013); Lung lobectomy (2014); Other surgical history (2014); CT angio head w and wo IV contrast (2021); CT angio neck (2021); and CT angio neck (2022).    OB/GYN History      Oncology History    No history exists.       Social History  She reports that she has been smoking cigarettes. She started smoking about 53 years ago. She has a 26.6 pack-year smoking history. She has never used smokeless tobacco. She reports that she does not currently use alcohol. She reports that she does not currently use drugs.     Allergies  Morphine, Myrbetriq [mirabegron], Oxycodone-acetaminophen, and Sulfa (sulfonamide antibiotics)    Review of Systems     Physical Exam  Genitourinary:     Comments: On exam, left labia majora edematous, erythematous, and warm, no obvious fluctuance or crepitus. Erythema spread to right labia majora which is mildly swollen. Erythema and edema track up to just superior to pubic bone. No apparent inguinal involvement. Femoral pulses intact. Small area inferior left labia with weeping of serous discharge.          Last Recorded Vitals  Blood  "pressure 102/50, pulse 78, temperature 36 °C (96.8 °F), resp. rate 16, height 1.651 m (5' 5\"), weight 50.8 kg (112 lb), SpO2 99%.    Relevant Results  Scheduled medications  [START ON 3/13/2025] clindamycin, 900 mg, intravenous, q8h  oxygen, , inhalation, Continuous - Inhalation  piperacillin-tazobactam, 4.5 g, intravenous, q6h  [START ON 3/13/2025] vancomycin, 1,000 mg, intravenous, q24h      Continuous medications     PRN medications    CT abdomen pelvis w IV contrast    Result Date: 3/12/2025  STUDY: CT Abdomen and Pelvis with IV Contrast; 3/12/2025 12:44 PM. INDICATION: Large groin abscess, cellulitis; Evaluate for gas. COMPARISON: PET/CT 9/20/2024.  CT chest abdomen pelvis 8/30/2024.  US renal 6/7/2024.  CT abdomen pelvis 4/5/2024.  US pelvis 12/15/2023. ACCESSION NUMBER(S): [id number] ORDERING CLINICIAN: RACHEL BARBOZA TECHNIQUE: CT of the abdomen and pelvis was performed.  Contiguous axial images were obtained at 3 mm slice thickness through the abdomen and pelvis. Coronal and sagittal reconstructions at 3 mm slice thickness were performed.  Omnipaque 350m 70 mL was administered intravenously.  FINDINGS: LOWER CHEST: No cardiomegaly.  No pericardial effusion.  There are interstitial changes posteriorly in the lung bases consistent with atelectasis or fibrosis.  ABDOMEN:  LIVER: No hepatomegaly.  Smooth surface contour.  Normal attenuation.  BILE DUCTS: No intrahepatic or extrahepatic biliary ductal dilatation.  GALLBLADDER: The gallbladder is distended. STOMACH: No abnormalities identified.  PANCREAS: No masses or ductal dilatation.  SPLEEN: No splenomegaly or focal splenic lesion.  ADRENAL GLANDS: No thickening or nodules.  KIDNEYS AND URETERS: Kidneys are normal in size and location.  There is a 2 mm nonobstructing calculus in the upper pole of the right kidney.  There is a 4 mm nonobstructing calculus in the lower pole of the right kidney.  There are multiple 1 mm nonobstructing calculi in the lower pole " of the right kidney.  PELVIS:  BLADDER: No abnormalities identified.  REPRODUCTIVE ORGANS: No abnormalities identified.  BOWEL: No abnormalities identified.  The appendix is identified and is normal.  VESSELS: No abnormalities identified.  Abdominal aorta is normal in caliber.  PERITONEUM/RETROPERITONEUM/LYMPH NODES: No free fluid.  No pneumoperitoneum. No lymphadenopathy.  ABDOMINAL WALL: No abnormalities identified. SOFT TISSUES: There is diffuse edema in the subcutaneous fat in the area of the left perineum.  There is no definite gas bubbles to suggest Alyce's gangrene at this time however, there are multiple fluid collections present.  These are somewhat ill-defined but the largest measures approximately 1.8 x 4.5 cm.  It is best seen on series 201, slice 147.  Early abscess formation cannot be excluded.  BONES: No acute fracture or aggressive osseous lesion.    Inflammatory change in the left perineum with findings suggestive of early abscess formation.  There is no definite gas bubbles at this time. Distended gallbladder. Nonobstructing right renal calculi. Signed by Heri Parish MD   Results for orders placed or performed during the hospital encounter of 03/12/25 (from the past 24 hours)   CBC and Auto Differential   Result Value Ref Range    WBC 33.6 (H) 4.4 - 11.3 x10*3/uL    nRBC 0.0 0.0 - 0.0 /100 WBCs    RBC 4.39 4.00 - 5.20 x10*6/uL    Hemoglobin 13.0 12.0 - 16.0 g/dL    Hematocrit 39.3 36.0 - 46.0 %    MCV 90 80 - 100 fL    MCH 29.6 26.0 - 34.0 pg    MCHC 33.1 32.0 - 36.0 g/dL    RDW 17.0 (H) 11.5 - 14.5 %    Platelets 192 150 - 450 x10*3/uL    Neutrophils % 85.9 40.0 - 80.0 %    Immature Granulocytes %, Automated 2.2 (H) 0.0 - 0.9 %    Lymphocytes % 4.4 13.0 - 44.0 %    Monocytes % 6.9 2.0 - 10.0 %    Eosinophils % 0.2 0.0 - 6.0 %    Basophils % 0.4 0.0 - 2.0 %    Neutrophils Absolute 28.83 (H) 1.60 - 5.50 x10*3/uL    Immature Granulocytes Absolute, Automated 0.75 (H) 0.00 - 0.50 x10*3/uL     Lymphocytes Absolute 1.47 0.80 - 3.00 x10*3/uL    Monocytes Absolute 2.31 (H) 0.05 - 0.80 x10*3/uL    Eosinophils Absolute 0.07 0.00 - 0.40 x10*3/uL    Basophils Absolute 0.14 (H) 0.00 - 0.10 x10*3/uL   Comprehensive Metabolic Panel   Result Value Ref Range    Glucose 83 74 - 99 mg/dL    Sodium 130 (L) 136 - 145 mmol/L    Potassium 3.7 3.5 - 5.3 mmol/L    Chloride 93 (L) 98 - 107 mmol/L    Bicarbonate 24 21 - 32 mmol/L    Anion Gap 17 10 - 20 mmol/L    Urea Nitrogen 23 6 - 23 mg/dL    Creatinine 0.84 0.50 - 1.05 mg/dL    eGFR 74 >60 mL/min/1.73m*2    Calcium 9.5 8.6 - 10.3 mg/dL    Albumin 3.5 3.4 - 5.0 g/dL    Alkaline Phosphatase 108 33 - 136 U/L    Total Protein 6.7 6.4 - 8.2 g/dL    AST 15 9 - 39 U/L    Bilirubin, Total 0.7 0.0 - 1.2 mg/dL    ALT 13 7 - 45 U/L   Lactate   Result Value Ref Range    Lactate 1.8 0.4 - 2.0 mmol/L   Blood Culture    Specimen: Peripheral Venipuncture; Blood culture   Result Value Ref Range    Blood Culture Loaded on Instrument - Culture in progress    Blood Culture    Specimen: Peripheral Venipuncture; Blood culture   Result Value Ref Range    Blood Culture Loaded on Instrument - Culture in progress    Urinalysis with Reflex Culture and Microscopic   Result Value Ref Range    Color, Urine Yellow Light-Yellow, Yellow, Dark-Yellow    Appearance, Urine Clear Clear    Specific Gravity, Urine 1.036 (N) 1.005 - 1.035    pH, Urine 6.0 5.0, 5.5, 6.0, 6.5, 7.0, 7.5, 8.0    Protein, Urine 70 (1+) (A) NEGATIVE, 10 (TRACE), 20 (TRACE) mg/dL    Glucose, Urine Normal Normal mg/dL    Blood, Urine 0.1 (1+) (A) NEGATIVE mg/dL    Ketones, Urine 10 (1+) (A) NEGATIVE mg/dL    Bilirubin, Urine NEGATIVE NEGATIVE mg/dL    Urobilinogen, Urine Normal Normal mg/dL    Nitrite, Urine NEGATIVE NEGATIVE    Leukocyte Esterase, Urine 75 Kimber/uL (A) NEGATIVE   Microscopic Only, Urine   Result Value Ref Range    WBC, Urine 6-10 (A) 1-5, NONE /HPF    RBC, Urine 3-5 NONE, 1-2, 3-5 /HPF    Squamous Epithelial Cells, Urine  1-9 (SPARSE) Reference range not established. /HPF    Bacteria, Urine 4+ (A) NONE SEEN /HPF    Fine Granular Casts, Urine 1+ (A) NONE /LPF     *Note: Due to a large number of results and/or encounters for the requested time period, some results have not been displayed. A complete set of results can be found in Results Review.       Assessment/Plan   Assessment & Plan    Edu Domingo is a 74 y.o. female with multiple medical comorbidities and a history of VIN3 presenting today with labial cellulitis and likely developing abscess.     #labial cellulitis and developing abscess  -exam findings as above, no obvious areas to drain on exam   -low concern for necrotizing fascitis on exam at this time  -imaging reviewed, no obvious pocket or gas formation   -second recurrence of abscess, A1c last 8/2024 5.9 and HIV negative, has extensive smoking history  -discussed with ER PA and Dr. Penaloza, at this time recommend fluid resuscitation and continuation of IV antibiotics  -urgent surgery not necessary at this time, patient stable to be transferred to [facility]  -in the event that patient becomes unstable, would recommend admission to ICU and stabilization with pressure support and IV abx    #?O2 requirement  -patient not on O2 in room however documented in chart, per staff having difficulty with monitoring  -given patient's history of multiple cancers, recommend CTPE if truly O2 requirement  -patient also has had lobectomy and has COPD, could be contributing     Dispo: to [facility]. Remain available if patient unable to be transferred.          I spent 30 minutes in the professional and overall care of this patient.      Marky Boyle MD

## 2025-03-14 LAB
ANION GAP SERPL CALC-SCNC: 12 MMOL/L (ref 10–20)
BACTERIA BLD AEROBE CULT: ABNORMAL
BACTERIA BLD AEROBE CULT: ABNORMAL
BACTERIA BLD CULT: ABNORMAL
BACTERIA BLD CULT: ABNORMAL
BUN SERPL-MCNC: 16 MG/DL (ref 6–23)
CALCIUM SERPL-MCNC: 8.6 MG/DL (ref 8.6–10.6)
CHLORIDE SERPL-SCNC: 94 MMOL/L (ref 98–107)
CO2 SERPL-SCNC: 25 MMOL/L (ref 21–32)
CREAT SERPL-MCNC: 0.96 MG/DL (ref 0.5–1.05)
EGFRCR SERPLBLD CKD-EPI 2021: 63 ML/MIN/1.73M*2
ERYTHROCYTE [DISTWIDTH] IN BLOOD BY AUTOMATED COUNT: 16.2 % (ref 11.5–14.5)
GLUCOSE SERPL-MCNC: 97 MG/DL (ref 74–99)
GRAM STN SPEC: ABNORMAL
HCT VFR BLD AUTO: 30.4 % (ref 36–46)
HGB BLD-MCNC: 10.7 G/DL (ref 12–16)
MAGNESIUM SERPL-MCNC: 1.78 MG/DL (ref 1.6–2.4)
MCH RBC QN AUTO: 29.9 PG (ref 26–34)
MCHC RBC AUTO-ENTMCNC: 35.2 G/DL (ref 32–36)
MCV RBC AUTO: 85 FL (ref 80–100)
NRBC BLD-RTO: 0 /100 WBCS (ref 0–0)
PLATELET # BLD AUTO: 189 X10*3/UL (ref 150–450)
POTASSIUM SERPL-SCNC: 3.3 MMOL/L (ref 3.5–5.3)
RBC # BLD AUTO: 3.58 X10*6/UL (ref 4–5.2)
SODIUM SERPL-SCNC: 128 MMOL/L (ref 136–145)
WBC # BLD AUTO: 20.5 X10*3/UL (ref 4.4–11.3)

## 2025-03-14 PROCEDURE — 2500000004 HC RX 250 GENERAL PHARMACY W/ HCPCS (ALT 636 FOR OP/ED)

## 2025-03-14 PROCEDURE — 2500000001 HC RX 250 WO HCPCS SELF ADMINISTERED DRUGS (ALT 637 FOR MEDICARE OP)

## 2025-03-14 PROCEDURE — 1200000003 HC ONCOLOGY  ROOM WITH TELEMETRY DAILY

## 2025-03-14 PROCEDURE — 99232 SBSQ HOSP IP/OBS MODERATE 35: CPT

## 2025-03-14 PROCEDURE — 2500000002 HC RX 250 W HCPCS SELF ADMINISTERED DRUGS (ALT 637 FOR MEDICARE OP, ALT 636 FOR OP/ED)

## 2025-03-14 PROCEDURE — 83735 ASSAY OF MAGNESIUM: CPT

## 2025-03-14 PROCEDURE — 94640 AIRWAY INHALATION TREATMENT: CPT

## 2025-03-14 PROCEDURE — 85027 COMPLETE CBC AUTOMATED: CPT

## 2025-03-14 PROCEDURE — 36415 COLL VENOUS BLD VENIPUNCTURE: CPT

## 2025-03-14 PROCEDURE — 80048 BASIC METABOLIC PNL TOTAL CA: CPT

## 2025-03-14 RX ORDER — HYDROXYZINE HYDROCHLORIDE 25 MG/1
50 TABLET, FILM COATED ORAL ONCE
Status: DISCONTINUED | OUTPATIENT
Start: 2025-03-14 | End: 2025-03-17 | Stop reason: HOSPADM

## 2025-03-14 RX ORDER — POTASSIUM CHLORIDE 1.5 G/1.58G
40 POWDER, FOR SOLUTION ORAL ONCE
Status: COMPLETED | OUTPATIENT
Start: 2025-03-14 | End: 2025-03-14

## 2025-03-14 RX ORDER — LANOLIN ALCOHOL/MO/W.PET/CERES
800 CREAM (GRAM) TOPICAL ONCE
Status: COMPLETED | OUTPATIENT
Start: 2025-03-14 | End: 2025-03-14

## 2025-03-14 RX ORDER — DIPHENHYDRAMINE HYDROCHLORIDE 50 MG/ML
25 INJECTION, SOLUTION INTRAMUSCULAR; INTRAVENOUS ONCE
Status: COMPLETED | OUTPATIENT
Start: 2025-03-14 | End: 2025-03-14

## 2025-03-14 RX ORDER — ACETAMINOPHEN 325 MG/1
975 TABLET ORAL EVERY 6 HOURS PRN
Status: DISCONTINUED | OUTPATIENT
Start: 2025-03-14 | End: 2025-03-17 | Stop reason: HOSPADM

## 2025-03-14 RX ADMIN — VENLAFAXINE HYDROCHLORIDE 37.5 MG: 37.5 CAPSULE, EXTENDED RELEASE ORAL at 09:06

## 2025-03-14 RX ADMIN — CARVEDILOL 25 MG: 25 TABLET, FILM COATED ORAL at 16:57

## 2025-03-14 RX ADMIN — PIPERACILLIN SODIUM AND TAZOBACTAM SODIUM 3.38 G: 3; .375 INJECTION, SOLUTION INTRAVENOUS at 15:28

## 2025-03-14 RX ADMIN — HEPARIN SODIUM 5000 UNITS: 5000 INJECTION, SOLUTION INTRAVENOUS; SUBCUTANEOUS at 11:19

## 2025-03-14 RX ADMIN — PIPERACILLIN SODIUM AND TAZOBACTAM SODIUM 3.38 G: 3; .375 INJECTION, SOLUTION INTRAVENOUS at 20:49

## 2025-03-14 RX ADMIN — PIPERACILLIN SODIUM AND TAZOBACTAM SODIUM 3.38 G: 3; .375 INJECTION, SOLUTION INTRAVENOUS at 09:07

## 2025-03-14 RX ADMIN — POTASSIUM CHLORIDE 40 MEQ: 1.5 POWDER, FOR SOLUTION ORAL at 11:19

## 2025-03-14 RX ADMIN — MAGNESIUM OXIDE TAB 400 MG (241.3 MG ELEMENTAL MG) 800 MG: 400 (241.3 MG) TAB at 11:19

## 2025-03-14 RX ADMIN — FORMOTEROL FUMARATE DIHYDRATE 20 MCG: 20 SOLUTION RESPIRATORY (INHALATION) at 20:37

## 2025-03-14 RX ADMIN — HEPARIN SODIUM 5000 UNITS: 5000 INJECTION, SOLUTION INTRAVENOUS; SUBCUTANEOUS at 03:24

## 2025-03-14 RX ADMIN — OXYBUTYNIN CHLORIDE 10 MG: 5 TABLET ORAL at 09:07

## 2025-03-14 RX ADMIN — CARVEDILOL 25 MG: 25 TABLET, FILM COATED ORAL at 09:07

## 2025-03-14 RX ADMIN — PANTOPRAZOLE SODIUM 40 MG: 40 TABLET, DELAYED RELEASE ORAL at 20:49

## 2025-03-14 RX ADMIN — ATORVASTATIN CALCIUM 80 MG: 80 TABLET, FILM COATED ORAL at 09:07

## 2025-03-14 RX ADMIN — IBUPROFEN 600 MG: 600 TABLET, FILM COATED ORAL at 03:37

## 2025-03-14 RX ADMIN — DIPHENHYDRAMINE HYDROCHLORIDE 25 MG: 50 INJECTION INTRAMUSCULAR; INTRAVENOUS at 03:49

## 2025-03-14 RX ADMIN — VANCOMYCIN HYDROCHLORIDE 1000 MG: 1 INJECTION, SOLUTION INTRAVENOUS at 11:19

## 2025-03-14 RX ADMIN — HEPARIN SODIUM 5000 UNITS: 5000 INJECTION, SOLUTION INTRAVENOUS; SUBCUTANEOUS at 20:49

## 2025-03-14 ASSESSMENT — COGNITIVE AND FUNCTIONAL STATUS - GENERAL
MOBILITY SCORE: 24
MOBILITY SCORE: 24
DAILY ACTIVITIY SCORE: 24
DAILY ACTIVITIY SCORE: 24

## 2025-03-14 ASSESSMENT — PAIN SCALES - GENERAL
PAINLEVEL_OUTOF10: 0 - NO PAIN
PAINLEVEL_OUTOF10: 4
PAINLEVEL_OUTOF10: 0 - NO PAIN

## 2025-03-14 ASSESSMENT — PAIN - FUNCTIONAL ASSESSMENT
PAIN_FUNCTIONAL_ASSESSMENT: 0-10
PAIN_FUNCTIONAL_ASSESSMENT: 0-10

## 2025-03-14 NOTE — CARE PLAN
The patient's goals for the shift include pain management    The clinical goals for the shift include pain management

## 2025-03-14 NOTE — HOSPITAL COURSE
74 y.o. female who is s/p R wide excision of anterior vulvar/thigh lesion, R wide excision of perianal lesion, and R thigh punch biopsy on 6/2/2021 for VIN3 who presented as transfer for vulvar abscess.

## 2025-03-14 NOTE — PROGRESS NOTES
03/14/25 1300   Discharge Planning   Living Arrangements Spouse/significant other   Support Systems Spouse/significant other   Type of Residence Private residence   Number of Stairs to Enter Residence 3   Number of Stairs Within Residence 12   Who is requesting discharge planning? Provider   Home or Post Acute Services None   Expected Discharge Disposition Home   Does the patient need discharge transport arranged? No     74 y.o. F with VIN3 s/p WLE in 2021 presenting as transfer for vulvar abscess vs cellulitis, pt is currently on  IV antibiotics, plan to transition to oral antibiotics 3/15.   ADOD 3/17 Anticipate HNN    TCC Note:  Per the medical team, this patient has no anticipated discharge needs; full assessment deferred at this time. Care transitions will continue to follow for any discharge needs that arise. Ladi Ojeda RN, TCC

## 2025-03-14 NOTE — PROGRESS NOTES
"Edu Domingo is a 74 y.o. female on day 1 of admission presenting with Perineal abscess.    Subjective   Pt is agitated this morning. Does not know she is in hospital. Reports pain is well controlled, pain is worse when moving. Denies fevers or chills.        Objective     General: agitated, trying to get out of bed   HEENT: normocephalic, atraumatic  Heart: warm and well perfused, RRR  Lungs: no increased WOB on 2LNC, mild rhonchi bilaterally   Abd: soft, nontender  : L labia erythematous, indurated extending from mons toward posterior fourchette, 2cm crusted lesion at base of erythema. No areas of fluctuance or crepitus noted. Appropriately tender to palpation. Area of erythema receding from previously outlined margins.   Extremities: moving all extremities  Neuro: awake and conversant  Psych: agitated, confused     Last Recorded Vitals  Blood pressure 123/60, pulse 74, temperature 36.4 °C (97.5 °F), temperature source Temporal, resp. rate 18, height 1.651 m (5' 5\"), weight 50.8 kg (112 lb), SpO2 98%.  Intake/Output last 3 Shifts:  I/O last 3 completed shifts:  In: 300 (5.9 mL/kg) [IV Piggyback:300]  Out: - (0 mL/kg)   Weight: 50.8 kg     Relevant Results  Lab Results   Component Value Date    WBC 20.5 (H) 03/14/2025    HGB 10.7 (L) 03/14/2025    HCT 30.4 (L) 03/14/2025    MCV 85 03/14/2025     03/14/2025        Lab Results   Component Value Date    GLUCOSE 97 03/14/2025    CALCIUM 8.6 03/14/2025     (L) 03/14/2025    K 3.3 (L) 03/14/2025    CO2 25 03/14/2025    CL 94 (L) 03/14/2025    BUN 16 03/14/2025    CREATININE 0.96 03/14/2025           Assessment/Plan   Assessment & Plan  Perineal abscess     74 y.o. F with VIN3 s/p WLE in 2021 presenting as transfer for vulvar abscess vs cellulitis     L Labial Cellulitis, Developing Abscess  - CT with inflammatory changes in L perineum suggestive of early abscess formation, no evidence of gas  - significant drainage at home, no further drainage or areas of " fluctuance on arrival. Pain appropriate, no signs of necrotizing fasciitis since arrival.   - Vanc/Zosyn/Clinda initiated at OSH. Discontinued clindamycin (3/12-3/14). Continue Vanc/Zosyn (3/12 - )  - WBC downtrending. Exam improving from prior marked area.   - Pain control with tylenol, ibuprofen, tramadol PRN.   - Given overall clinical improvement on IV abx and absence of areas to drain, plan to defer I&D and continue management with IV abx.   - Will consider switching to PO antibitics on 3/15.      O2 Requirement   - hx smoking, COPD, hx lobectomy. Reports PRN O2 use at home  - 2LNC on arrival, wean as able     Agitation/Sundowning   -Per family, baseline is agitation overnight   -Ativan PRN, additional medications as necessary   -Will defer vitals overnight to encourage sleep   -delirium precautions     Medical Co-morbidities  - Hypertension- cont home carvedilol. Hold home Lisinopril, Norvasc, Furosemide   - OAB- Oxybutynin cont  - CAD s/p RCA PCI- Nitroglycerin PRN cont, ASA held   - H/o lung CA, COPD- daily inhaler cont, albuterol PRN  - H/o TIA- no meds   - H/o nonsustained VT- telemetry   - Depression/Anxiety- Effexor, Xanax cont  - HLD- Lipitor cont     Seen and d/w Dr. Ca Queen MD, PGY1  Gynecologic Oncology Pager [phone]

## 2025-03-14 NOTE — CARE PLAN
The patient's goals for the shift include pain management    The clinical goals for the shift include patient will remain safe and free from injury throughout shift.      Problem: Pain  Goal: Takes deep breaths with improved pain control throughout the shift  Outcome: Progressing  Goal: Turns in bed with improved pain control throughout the shift  Outcome: Progressing  Goal: Walks with improved pain control throughout the shift  Outcome: Progressing  Goal: Performs ADL's with improved pain control throughout shift  Outcome: Progressing  Goal: Participates in PT with improved pain control throughout the shift  Outcome: Progressing  Goal: Free from opioid side effects throughout the shift  Outcome: Progressing  Goal: Free from acute confusion related to pain meds throughout the shift  Outcome: Progressing

## 2025-03-15 LAB
ANION GAP SERPL CALC-SCNC: 18 MMOL/L (ref 10–20)
BUN SERPL-MCNC: 12 MG/DL (ref 6–23)
CALCIUM SERPL-MCNC: 8.7 MG/DL (ref 8.6–10.6)
CHLORIDE SERPL-SCNC: 97 MMOL/L (ref 98–107)
CO2 SERPL-SCNC: 22 MMOL/L (ref 21–32)
CREAT SERPL-MCNC: 0.87 MG/DL (ref 0.5–1.05)
EGFRCR SERPLBLD CKD-EPI 2021: 71 ML/MIN/1.73M*2
ERYTHROCYTE [DISTWIDTH] IN BLOOD BY AUTOMATED COUNT: 16.7 % (ref 11.5–14.5)
GLUCOSE SERPL-MCNC: 95 MG/DL (ref 74–99)
HCT VFR BLD AUTO: 33.6 % (ref 36–46)
HGB BLD-MCNC: 11.2 G/DL (ref 12–16)
MAGNESIUM SERPL-MCNC: 1.88 MG/DL (ref 1.6–2.4)
MCH RBC QN AUTO: 28.8 PG (ref 26–34)
MCHC RBC AUTO-ENTMCNC: 33.3 G/DL (ref 32–36)
MCV RBC AUTO: 86 FL (ref 80–100)
NRBC BLD-RTO: 0 /100 WBCS (ref 0–0)
PLATELET # BLD AUTO: 241 X10*3/UL (ref 150–450)
POTASSIUM SERPL-SCNC: 3.9 MMOL/L (ref 3.5–5.3)
RBC # BLD AUTO: 3.89 X10*6/UL (ref 4–5.2)
SODIUM SERPL-SCNC: 133 MMOL/L (ref 136–145)
VANCOMYCIN SERPL-MCNC: 7.6 UG/ML (ref 5–20)
WBC # BLD AUTO: 22 X10*3/UL (ref 4.4–11.3)

## 2025-03-15 PROCEDURE — 2500000001 HC RX 250 WO HCPCS SELF ADMINISTERED DRUGS (ALT 637 FOR MEDICARE OP)

## 2025-03-15 PROCEDURE — 1200000003 HC ONCOLOGY  ROOM WITH TELEMETRY DAILY

## 2025-03-15 PROCEDURE — 80202 ASSAY OF VANCOMYCIN: CPT

## 2025-03-15 PROCEDURE — 87070 CULTURE OTHR SPECIMN AEROBIC: CPT | Performed by: STUDENT IN AN ORGANIZED HEALTH CARE EDUCATION/TRAINING PROGRAM

## 2025-03-15 PROCEDURE — 0U9MXZZ DRAINAGE OF VULVA, EXTERNAL APPROACH: ICD-10-PCS | Performed by: STUDENT IN AN ORGANIZED HEALTH CARE EDUCATION/TRAINING PROGRAM

## 2025-03-15 PROCEDURE — 85027 COMPLETE CBC AUTOMATED: CPT

## 2025-03-15 PROCEDURE — 36415 COLL VENOUS BLD VENIPUNCTURE: CPT

## 2025-03-15 PROCEDURE — 2500000004 HC RX 250 GENERAL PHARMACY W/ HCPCS (ALT 636 FOR OP/ED)

## 2025-03-15 PROCEDURE — 2500000002 HC RX 250 W HCPCS SELF ADMINISTERED DRUGS (ALT 637 FOR MEDICARE OP, ALT 636 FOR OP/ED)

## 2025-03-15 PROCEDURE — 2500000004 HC RX 250 GENERAL PHARMACY W/ HCPCS (ALT 636 FOR OP/ED): Performed by: STUDENT IN AN ORGANIZED HEALTH CARE EDUCATION/TRAINING PROGRAM

## 2025-03-15 PROCEDURE — 99232 SBSQ HOSP IP/OBS MODERATE 35: CPT

## 2025-03-15 PROCEDURE — 80048 BASIC METABOLIC PNL TOTAL CA: CPT

## 2025-03-15 PROCEDURE — 83735 ASSAY OF MAGNESIUM: CPT

## 2025-03-15 PROCEDURE — 94640 AIRWAY INHALATION TREATMENT: CPT

## 2025-03-15 PROCEDURE — 10060 I&D ABSCESS SIMPLE/SINGLE: CPT | Performed by: STUDENT IN AN ORGANIZED HEALTH CARE EDUCATION/TRAINING PROGRAM

## 2025-03-15 RX ORDER — LIDOCAINE HYDROCHLORIDE AND EPINEPHRINE 10; 10 UG/ML; MG/ML
20 INJECTION, SOLUTION INFILTRATION; PERINEURAL ONCE
Status: DISCONTINUED | OUTPATIENT
Start: 2025-03-15 | End: 2025-03-15

## 2025-03-15 RX ORDER — LANOLIN ALCOHOL/MO/W.PET/CERES
400 CREAM (GRAM) TOPICAL ONCE
Status: COMPLETED | OUTPATIENT
Start: 2025-03-15 | End: 2025-03-15

## 2025-03-15 RX ORDER — VANCOMYCIN HYDROCHLORIDE 1 G/20ML
INJECTION, POWDER, LYOPHILIZED, FOR SOLUTION INTRAVENOUS DAILY PRN
Status: DISCONTINUED | OUTPATIENT
Start: 2025-03-15 | End: 2025-03-17

## 2025-03-15 RX ORDER — VANCOMYCIN HYDROCHLORIDE 500 MG/100ML
500 INJECTION, SOLUTION INTRAVENOUS ONCE
Status: COMPLETED | OUTPATIENT
Start: 2025-03-15 | End: 2025-03-15

## 2025-03-15 RX ORDER — LIDOCAINE HYDROCHLORIDE 10 MG/ML
20 INJECTION, SOLUTION INFILTRATION; PERINEURAL ONCE
Status: DISCONTINUED | OUTPATIENT
Start: 2025-03-15 | End: 2025-03-17 | Stop reason: HOSPADM

## 2025-03-15 RX ORDER — POLYETHYLENE GLYCOL 3350 17 G/17G
17 POWDER, FOR SOLUTION ORAL DAILY
Status: DISCONTINUED | OUTPATIENT
Start: 2025-03-15 | End: 2025-03-17 | Stop reason: HOSPADM

## 2025-03-15 RX ORDER — SENNOSIDES 8.6 MG/1
1 TABLET ORAL NIGHTLY
Status: DISCONTINUED | OUTPATIENT
Start: 2025-03-15 | End: 2025-03-17 | Stop reason: HOSPADM

## 2025-03-15 RX ADMIN — PIPERACILLIN SODIUM AND TAZOBACTAM SODIUM 3.38 G: 3; .375 INJECTION, SOLUTION INTRAVENOUS at 21:24

## 2025-03-15 RX ADMIN — TRAMADOL HYDROCHLORIDE 50 MG: 50 TABLET, COATED ORAL at 08:51

## 2025-03-15 RX ADMIN — HEPARIN SODIUM 5000 UNITS: 5000 INJECTION, SOLUTION INTRAVENOUS; SUBCUTANEOUS at 12:11

## 2025-03-15 RX ADMIN — FORMOTEROL FUMARATE DIHYDRATE 20 MCG: 20 SOLUTION RESPIRATORY (INHALATION) at 21:03

## 2025-03-15 RX ADMIN — CARVEDILOL 25 MG: 25 TABLET, FILM COATED ORAL at 17:20

## 2025-03-15 RX ADMIN — HEPARIN SODIUM 5000 UNITS: 5000 INJECTION, SOLUTION INTRAVENOUS; SUBCUTANEOUS at 03:43

## 2025-03-15 RX ADMIN — PIPERACILLIN SODIUM AND TAZOBACTAM SODIUM 3.38 G: 3; .375 INJECTION, SOLUTION INTRAVENOUS at 03:43

## 2025-03-15 RX ADMIN — MAGNESIUM OXIDE TAB 400 MG (241.3 MG ELEMENTAL MG) 400 MG: 400 (241.3 MG) TAB at 12:11

## 2025-03-15 RX ADMIN — VENLAFAXINE HYDROCHLORIDE 37.5 MG: 37.5 CAPSULE, EXTENDED RELEASE ORAL at 08:36

## 2025-03-15 RX ADMIN — POLYETHYLENE GLYCOL 3350 17 G: 17 POWDER, FOR SOLUTION ORAL at 10:02

## 2025-03-15 RX ADMIN — SENNOSIDES 8.6 MG: 8.6 TABLET, FILM COATED ORAL at 21:24

## 2025-03-15 RX ADMIN — TIOTROPIUM BROMIDE INHALATION SPRAY 2 PUFF: 3.12 SPRAY, METERED RESPIRATORY (INHALATION) at 09:17

## 2025-03-15 RX ADMIN — PIPERACILLIN SODIUM AND TAZOBACTAM SODIUM 3.38 G: 3; .375 INJECTION, SOLUTION INTRAVENOUS at 15:40

## 2025-03-15 RX ADMIN — VANCOMYCIN HYDROCHLORIDE 1000 MG: 1 INJECTION, SOLUTION INTRAVENOUS at 12:29

## 2025-03-15 RX ADMIN — PANTOPRAZOLE SODIUM 40 MG: 40 TABLET, DELAYED RELEASE ORAL at 21:24

## 2025-03-15 RX ADMIN — PIPERACILLIN SODIUM AND TAZOBACTAM SODIUM 3.38 G: 3; .375 INJECTION, SOLUTION INTRAVENOUS at 08:36

## 2025-03-15 RX ADMIN — TRAMADOL HYDROCHLORIDE 50 MG: 50 TABLET, COATED ORAL at 21:36

## 2025-03-15 RX ADMIN — ATORVASTATIN CALCIUM 80 MG: 80 TABLET, FILM COATED ORAL at 08:36

## 2025-03-15 RX ADMIN — FORMOTEROL FUMARATE DIHYDRATE 20 MCG: 20 SOLUTION RESPIRATORY (INHALATION) at 09:17

## 2025-03-15 RX ADMIN — VANCOMYCIN HYDROCHLORIDE 500 MG: 500 INJECTION, SOLUTION INTRAVENOUS at 17:20

## 2025-03-15 RX ADMIN — CARVEDILOL 25 MG: 25 TABLET, FILM COATED ORAL at 08:36

## 2025-03-15 RX ADMIN — HEPARIN SODIUM 5000 UNITS: 5000 INJECTION, SOLUTION INTRAVENOUS; SUBCUTANEOUS at 21:24

## 2025-03-15 ASSESSMENT — COGNITIVE AND FUNCTIONAL STATUS - GENERAL
MOBILITY SCORE: 24
MOBILITY SCORE: 24
DAILY ACTIVITIY SCORE: 24
DAILY ACTIVITIY SCORE: 24

## 2025-03-15 ASSESSMENT — PAIN - FUNCTIONAL ASSESSMENT
PAIN_FUNCTIONAL_ASSESSMENT: 0-10
PAIN_FUNCTIONAL_ASSESSMENT: WONG-BAKER FACES

## 2025-03-15 ASSESSMENT — PAIN SCALES - GENERAL
PAINLEVEL_OUTOF10: 10 - WORST POSSIBLE PAIN
PAINLEVEL_OUTOF10: 0 - NO PAIN
PAINLEVEL_OUTOF10: 7

## 2025-03-15 ASSESSMENT — PAIN SCALES - WONG BAKER: WONGBAKER_NUMERICALRESPONSE: NO HURT

## 2025-03-15 NOTE — PROGRESS NOTES
"Edu Domingo is a 74 y.o. female on day 2 of admission presenting with Perineal abscess.    Subjective   Low-grade fever ovn, normal on immediate recheck. Pt less confused this AM, reports some abdominal pain and vulvar pain with walking. Denies fevers or chills, n/v. Tolerating PO. Feels constipated, reports no BM since last week.          Objective     General: resting comfortably, NAD  HEENT: normocephalic, atraumatic  Heart: warm and well perfused, RRR  Lungs: no increased WOB on RA, CTAB  Abd: soft, moderately tender over suprapubic region, otherwise nontender  : L labia erythematous, indurated extending from mons toward posterior fourchette, 2cm crusted lesion at base of erythema, unable to express fluid or drainage. No areas of fluctuance or crepitus noted. Appropriately tender to palpation. Area of erythema receding from previously outlined margins.   Extremities: moving all extremities  Neuro: awake and conversant  Psych: agitated, confused     Last Recorded Vitals  Blood pressure 157/71, pulse 86, temperature 37.2 °C (99 °F), temperature source Temporal, resp. rate 16, height 1.651 m (5' 5\"), weight 50.8 kg (112 lb), SpO2 94%.  Intake/Output last 3 Shifts:  I/O last 3 completed shifts:  In: 640 (12.6 mL/kg) [P.O.:240; IV Piggyback:400]  Out: - (0 mL/kg)   Weight: 50.8 kg     Relevant Results  Lab Results   Component Value Date    WBC 22.0 (H) 03/15/2025    HGB 11.2 (L) 03/15/2025    HCT 33.6 (L) 03/15/2025    MCV 86 03/15/2025     03/15/2025        Lab Results   Component Value Date    GLUCOSE 95 03/15/2025    CALCIUM 8.7 03/15/2025     (L) 03/15/2025    K 3.9 03/15/2025    CO2 22 03/15/2025    CL 97 (L) 03/15/2025    BUN 12 03/15/2025    CREATININE 0.87 03/15/2025           Assessment/Plan   Assessment & Plan  Perineal abscess     74 y.o. F with VIN3 s/p WLE in 2021 presenting as transfer for vulvar abscess vs cellulitis     L Labial Cellulitis, Developing Abscess  - CT with inflammatory " changes in L perineum suggestive of early abscess formation, no evidence of gas  - significant drainage at home, no further drainage or areas of fluctuance on arrival. Pain appropriate, no signs of necrotizing fasciitis since arrival.   - Vanc/Zosyn/Clinda initiated at OSH. Discontinued clindamycin (3/12-3/14). Continue Vanc/Zosyn (3/12 - )  - WBC initially downtrending, now increasing 20 >22. Exam stable. Plan for bedside I&D today, see procedure note.   - Pain control with tylenol, ibuprofen, tramadol PRN.   - Cont IV abx. Vanc/zosyn (3/12 - ). S/p clinda (3/12-14).      Agitation/confusion  - persistent during the daytime 3/14. Oxybutynin, xanax, flexeril discontinued.   - symptoms improving  - delirium precautions - sitter at bedside, defer ovn vitals     Medical Co-morbidities  - Hypertension- cont home carvedilol. Hold home Lisinopril, Norvasc, Furosemide   - OAB- Oxybutynin cont  - CAD s/p RCA PCI- Nitroglycerin PRN cont, ASA held   - H/o lung CA, COPD- daily inhaler cont, albuterol PRN. Reports intermittent O2 req at home, wean O2 as able, currently on 2LNC.   - H/o TIA- no meds   - H/o nonsustained VT- telemetry   - Depression/Anxiety- Effexor, Xanax cont  - HLD- Lipitor cont     Seen and d/w Dr. Ca Cherry MD  PGY3, Gynecologic Oncology  Pager [phone]

## 2025-03-15 NOTE — CARE PLAN
The patient's goals for the shift include pain management    The clinical goals for the shift include patient will remin free from falls and injuries durig he shift    Over the shift, the patient did not make progress toward the following goals. Barriers to progression include . Recommendations to address these barriers include   Problem: Pain  Goal: Takes deep breaths with improved pain control throughout the shift  Outcome: Progressing  Goal: Turns in bed with improved pain control throughout the shift  Outcome: Progressing  Goal: Walks with improved pain control throughout the shift  Outcome: Progressing  Goal: Performs ADL's with improved pain control throughout shift  Outcome: Progressing  Goal: Participates in PT with improved pain control throughout the shift  Outcome: Progressing  Goal: Free from opioid side effects throughout the shift  Outcome: Progressing  Goal: Free from acute confusion related to pain meds throughout the shift  Outcome: Progressing   .

## 2025-03-15 NOTE — POST-PROCEDURE NOTE
Bedside I&D Procedure Note    Indications: Vulvar abscess    Procedure Details   The patient was placed in the lithotomy position. The nodule noted near the perineal body/inferior labia majora was prepped with betadine. 10cc of 1% lidocaine was infiltrated. A 3cm incision was made with a scalpel. Copious pus-like contents were expressed, around 50-100cc of drainage. A wound culture swab was collected. A sterile swab was used to probe the wound, about 1cm of tracking was noted toward 2 o'clock, no tracking noted inferiorly or medially, no tracking toward the anus was noted. The wound was irrigated with saline and packed with packing strips. The patient was then cleaned and all instruments were removed. The wound was hemostatic at the end of the procedure.     Condition:  stable    Complications:  None; patient tolerated the procedure well.    Plan:    - Daily wound packing  - Wound care consult  - Continue IV abx - vanc/zosyn   - Repeat CBC in AM     Seen and d/w Dr. Ca Cherry MD  PGY3, Gynecologic Oncology  Pager [phone]

## 2025-03-15 NOTE — NURSING NOTE
Telemetry strip reviewed with Jayne MEYER RN at shift change. Unable to print strip due to printers not working. HR 74. NSR.  WI 0.15  QRS 0.07  RR 0.83  QT 0.39  Qtc 0.43

## 2025-03-15 NOTE — PROGRESS NOTES
Vancomycin Dosing by Pharmacy- FOLLOW UP    Edu Domingo is a 74 y.o. year old female who Pharmacy has been consulted for vancomycin dosing for cellulitis, skin and soft tissue. Based on the patient's indication and renal status this patient is being dosed based on a goal AUC of 400-600.     Renal function is currently stable.    Current vancomycin dose: 1000 mg given every 24 hours    Estimated vancomycin AUC on current dose: 338 mg/L.hr     Visit Vitals  /52 (BP Location: Right arm, Patient Position: Lying)   Pulse 78   Temp 37 °C (98.6 °F) (Temporal)   Resp 18        Lab Results   Component Value Date    CREATININE 0.87 03/15/2025    CREATININE 0.96 2025    CREATININE 0.87 2025    CREATININE 0.95 2025        Patient weight is as follows:   Vitals:    25 2328   Weight: 50.8 kg (112 lb)       Cultures:  No results found for the encounter in last 14 days.       I/O last 3 completed shifts:  In: 640 (12.6 mL/kg) [P.O.:240; IV Piggyback:400]  Out: - (0 mL/kg)   Weight: 50.8 kg   I/O during current shift:  I/O this shift:  In: 370 [P.O.:120; IV Piggyback:250]  Out: -     Temp (24hrs), Av.3 °C (99.2 °F), Min:36.3 °C (97.3 °F), Max:38.1 °C (100.6 °F)      Assessment/Plan    Below goal AUC. Orders placed for new vancomcyin regimen of 1500 mg every 24 hours to begin at 3/16.  With supplemental 500mg to be given today    This dosing regimen is predicted by [employer] to result in the following pharmacokinetic parameters:    Loading dose: N/A  Regimen: 1500 mg IV every 24 hours.  Start time: 12:29 on 2025  Exposure target: AUC24 (range)400-600 mg/L.hr   [id number]: 508 mg/L.hr  AUC24,ss: 538 mg/L.hr  Probability of AUC24 > 400: 98 %  Ctrough,ss: 13.2 mg/L  Probability of Ctrough,ss > 20: 1 %      The next level will be obtained on 3/16 at . May be obtained sooner if clinically indicated.   Will continue to monitor renal function daily while on vancomycin and order serum creatinine at  least every 48 hours if not already ordered.  Follow for continued vancomycin needs, clinical response, and signs/symptoms of toxicity.       KARLI THORNTON

## 2025-03-16 VITALS
HEART RATE: 79 BPM | HEIGHT: 65 IN | TEMPERATURE: 99.3 F | DIASTOLIC BLOOD PRESSURE: 53 MMHG | RESPIRATION RATE: 20 BRPM | BODY MASS INDEX: 18.66 KG/M2 | WEIGHT: 112 LBS | SYSTOLIC BLOOD PRESSURE: 158 MMHG | OXYGEN SATURATION: 99 %

## 2025-03-16 LAB
ABO GROUP (TYPE) IN BLOOD: NORMAL
ANION GAP SERPL CALC-SCNC: 16 MMOL/L (ref 10–20)
ANTIBODY SCREEN: NORMAL
BACTERIA BLD CULT: NORMAL
BACTERIA SPEC CULT: ABNORMAL
BLOOD EXPIRATION DATE: NORMAL
BUN SERPL-MCNC: 9 MG/DL (ref 6–23)
CALCIUM SERPL-MCNC: 8.4 MG/DL (ref 8.6–10.6)
CHLORIDE SERPL-SCNC: 95 MMOL/L (ref 98–107)
CO2 SERPL-SCNC: 24 MMOL/L (ref 21–32)
CREAT SERPL-MCNC: 0.81 MG/DL (ref 0.5–1.05)
DISPENSE STATUS: NORMAL
EGFRCR SERPLBLD CKD-EPI 2021: 77 ML/MIN/1.73M*2
ERYTHROCYTE [DISTWIDTH] IN BLOOD BY AUTOMATED COUNT: 16.4 % (ref 11.5–14.5)
GLUCOSE SERPL-MCNC: 94 MG/DL (ref 74–99)
GRAM STN SPEC: ABNORMAL
GRAM STN SPEC: ABNORMAL
HCT VFR BLD AUTO: 31.9 % (ref 36–46)
HGB BLD-MCNC: 10.9 G/DL (ref 12–16)
MAGNESIUM SERPL-MCNC: 1.88 MG/DL (ref 1.6–2.4)
MCH RBC QN AUTO: 29.5 PG (ref 26–34)
MCHC RBC AUTO-ENTMCNC: 34.2 G/DL (ref 32–36)
MCV RBC AUTO: 86 FL (ref 80–100)
NRBC BLD-RTO: 0 /100 WBCS (ref 0–0)
PLATELET # BLD AUTO: 259 X10*3/UL (ref 150–450)
POTASSIUM SERPL-SCNC: 3.4 MMOL/L (ref 3.5–5.3)
PRODUCT BLOOD TYPE: 5100
PRODUCT CODE: NORMAL
RBC # BLD AUTO: 3.69 X10*6/UL (ref 4–5.2)
RH FACTOR (ANTIGEN D): NORMAL
SODIUM SERPL-SCNC: 132 MMOL/L (ref 136–145)
UNIT ABO: NORMAL
UNIT NUMBER: NORMAL
UNIT RH: NORMAL
UNIT VOLUME: 325
VANCOMYCIN SERPL-MCNC: 27.3 UG/ML (ref 5–20)
WBC # BLD AUTO: 16.2 X10*3/UL (ref 4.4–11.3)
XM INTEP: NORMAL

## 2025-03-16 PROCEDURE — 2500000001 HC RX 250 WO HCPCS SELF ADMINISTERED DRUGS (ALT 637 FOR MEDICARE OP)

## 2025-03-16 PROCEDURE — 80048 BASIC METABOLIC PNL TOTAL CA: CPT

## 2025-03-16 PROCEDURE — 83735 ASSAY OF MAGNESIUM: CPT

## 2025-03-16 PROCEDURE — 2500000002 HC RX 250 W HCPCS SELF ADMINISTERED DRUGS (ALT 637 FOR MEDICARE OP, ALT 636 FOR OP/ED)

## 2025-03-16 PROCEDURE — 2500000005 HC RX 250 GENERAL PHARMACY W/O HCPCS: Performed by: STUDENT IN AN ORGANIZED HEALTH CARE EDUCATION/TRAINING PROGRAM

## 2025-03-16 PROCEDURE — 85027 COMPLETE CBC AUTOMATED: CPT

## 2025-03-16 PROCEDURE — 2500000004 HC RX 250 GENERAL PHARMACY W/ HCPCS (ALT 636 FOR OP/ED): Performed by: STUDENT IN AN ORGANIZED HEALTH CARE EDUCATION/TRAINING PROGRAM

## 2025-03-16 PROCEDURE — 36415 COLL VENOUS BLD VENIPUNCTURE: CPT | Performed by: STUDENT IN AN ORGANIZED HEALTH CARE EDUCATION/TRAINING PROGRAM

## 2025-03-16 PROCEDURE — 36415 COLL VENOUS BLD VENIPUNCTURE: CPT

## 2025-03-16 PROCEDURE — 2500000004 HC RX 250 GENERAL PHARMACY W/ HCPCS (ALT 636 FOR OP/ED)

## 2025-03-16 PROCEDURE — 86900 BLOOD TYPING SEROLOGIC ABO: CPT

## 2025-03-16 PROCEDURE — 94640 AIRWAY INHALATION TREATMENT: CPT

## 2025-03-16 PROCEDURE — 80202 ASSAY OF VANCOMYCIN: CPT | Performed by: STUDENT IN AN ORGANIZED HEALTH CARE EDUCATION/TRAINING PROGRAM

## 2025-03-16 PROCEDURE — 1200000003 HC ONCOLOGY  ROOM WITH TELEMETRY DAILY

## 2025-03-16 RX ADMIN — HEPARIN SODIUM 5000 UNITS: 5000 INJECTION, SOLUTION INTRAVENOUS; SUBCUTANEOUS at 13:08

## 2025-03-16 RX ADMIN — PANTOPRAZOLE SODIUM 40 MG: 40 TABLET, DELAYED RELEASE ORAL at 20:48

## 2025-03-16 RX ADMIN — TRAMADOL HYDROCHLORIDE 50 MG: 50 TABLET, COATED ORAL at 09:47

## 2025-03-16 RX ADMIN — VENLAFAXINE HYDROCHLORIDE 37.5 MG: 37.5 CAPSULE, EXTENDED RELEASE ORAL at 09:35

## 2025-03-16 RX ADMIN — PIPERACILLIN SODIUM AND TAZOBACTAM SODIUM 3.38 G: 3; .375 INJECTION, SOLUTION INTRAVENOUS at 09:35

## 2025-03-16 RX ADMIN — HEPARIN SODIUM 5000 UNITS: 5000 INJECTION, SOLUTION INTRAVENOUS; SUBCUTANEOUS at 19:08

## 2025-03-16 RX ADMIN — CARVEDILOL 25 MG: 25 TABLET, FILM COATED ORAL at 17:19

## 2025-03-16 RX ADMIN — PIPERACILLIN SODIUM AND TAZOBACTAM SODIUM 3.38 G: 3; .375 INJECTION, SOLUTION INTRAVENOUS at 23:36

## 2025-03-16 RX ADMIN — PIPERACILLIN SODIUM AND TAZOBACTAM SODIUM 3.38 G: 3; .375 INJECTION, SOLUTION INTRAVENOUS at 04:05

## 2025-03-16 RX ADMIN — FORMOTEROL FUMARATE DIHYDRATE 20 MCG: 20 SOLUTION RESPIRATORY (INHALATION) at 09:27

## 2025-03-16 RX ADMIN — CARVEDILOL 25 MG: 25 TABLET, FILM COATED ORAL at 09:35

## 2025-03-16 RX ADMIN — VANCOMYCIN HYDROCHLORIDE 1500 MG: 5 INJECTION, POWDER, LYOPHILIZED, FOR SOLUTION INTRAVENOUS at 13:08

## 2025-03-16 RX ADMIN — FORMOTEROL FUMARATE DIHYDRATE 20 MCG: 20 SOLUTION RESPIRATORY (INHALATION) at 21:00

## 2025-03-16 RX ADMIN — ATORVASTATIN CALCIUM 80 MG: 80 TABLET, FILM COATED ORAL at 09:35

## 2025-03-16 RX ADMIN — ALBUTEROL SULFATE 2 PUFF: 90 AEROSOL, METERED RESPIRATORY (INHALATION) at 20:46

## 2025-03-16 RX ADMIN — PIPERACILLIN SODIUM AND TAZOBACTAM SODIUM 3.38 G: 3; .375 INJECTION, SOLUTION INTRAVENOUS at 15:27

## 2025-03-16 RX ADMIN — TRAMADOL HYDROCHLORIDE 50 MG: 50 TABLET, COATED ORAL at 17:18

## 2025-03-16 RX ADMIN — HEPARIN SODIUM 5000 UNITS: 5000 INJECTION, SOLUTION INTRAVENOUS; SUBCUTANEOUS at 04:05

## 2025-03-16 ASSESSMENT — PAIN SCALES - GENERAL
PAINLEVEL_OUTOF10: 8
PAINLEVEL_OUTOF10: 6
PAINLEVEL_OUTOF10: 7
PAINLEVEL_OUTOF10: 9
PAINLEVEL_OUTOF10: 5 - MODERATE PAIN
PAINLEVEL_OUTOF10: 7

## 2025-03-16 ASSESSMENT — PAIN DESCRIPTION - ORIENTATION: ORIENTATION: LOWER

## 2025-03-16 ASSESSMENT — PAIN - FUNCTIONAL ASSESSMENT
PAIN_FUNCTIONAL_ASSESSMENT: 0-10

## 2025-03-16 ASSESSMENT — COGNITIVE AND FUNCTIONAL STATUS - GENERAL
DRESSING REGULAR UPPER BODY CLOTHING: A LITTLE
DRESSING REGULAR LOWER BODY CLOTHING: A LITTLE
MOBILITY SCORE: 24
HELP NEEDED FOR BATHING: A LITTLE
DAILY ACTIVITIY SCORE: 21

## 2025-03-16 ASSESSMENT — PAIN DESCRIPTION - DESCRIPTORS: DESCRIPTORS: OTHER (COMMENT)

## 2025-03-16 ASSESSMENT — PAIN DESCRIPTION - LOCATION
LOCATION: ABDOMEN
LOCATION: ABDOMEN

## 2025-03-16 NOTE — CARE PLAN
The patient's goals for the shift include pain management    The clinical goals for the shift include Pt will remain free from fall/injury this shift      Problem: Pain  Goal: Takes deep breaths with improved pain control throughout the shift  Outcome: Progressing  Goal: Turns in bed with improved pain control throughout the shift  Outcome: Progressing  Goal: Walks with improved pain control throughout the shift  Outcome: Progressing  Goal: Performs ADL's with improved pain control throughout shift  Outcome: Progressing  Goal: Participates in PT with improved pain control throughout the shift  Outcome: Progressing

## 2025-03-16 NOTE — CARE PLAN
The patient's goals for the shift include pain management    The clinical goals for the shift include Pt will remain safe and free from any injuries during this shift    Problem: Pain  Goal: Walks with improved pain control throughout the shift  Outcome: Progressing     Problem: Pain  Goal: Performs ADL's with improved pain control throughout shift  Outcome: Progressing     Problem: Fall/Injury  Goal: Verbalize understanding of personal risk factors for fall in the hospital  Outcome: Progressing     Problem: Fall/Injury  Goal: Verbalize understanding of risk factor reduction measures to prevent injury from fall in the home  Outcome: Progressing     Problem: Fall/Injury  Goal: Verbalize understanding of risk factor reduction measures to prevent injury from fall in the home  Outcome: Progressing

## 2025-03-16 NOTE — PROGRESS NOTES
"Edu Domingo is a 74 y.o. female on day 3 of admission presenting with Perineal abscess.    Subjective   Patient reports vulvar pain is same as yesterday. She also reports feeling a bulge in her LLQ when she goes to the bathroom that reduces on its own. Denies fevers or chills, n/v. Tolerating PO.         Objective     General: resting comfortably, NAD  HEENT: normocephalic, atraumatic  Heart: warm and well perfused, RRR  Lungs: no increased WOB on RA, CTAB  Abd: soft, moderately tender over suprapubic region, otherwise nontender  : L labia erythematous, indurated extending from mons toward posterior fourchette, incision at base w/ purulent fluid expressed. Small amount of packing strip placed. No areas of fluctuance or crepitus noted. Appropriately tender to palpation. Appears improved from yesterday.   Extremities: moving all extremities  Neuro: awake and conversant  Psych: appropriate mood    Last Recorded Vitals  Blood pressure 108/67, pulse 87, temperature 36.4 °C (97.5 °F), temperature source Temporal, resp. rate 16, height 1.651 m (5' 5\"), weight 50.8 kg (112 lb), SpO2 93%.  Intake/Output last 3 Shifts:  I/O last 3 completed shifts:  In: 1020 (20.1 mL/kg) [P.O.:520; IV Piggyback:500]  Out: - (0 mL/kg)   Weight: 50.8 kg     Relevant Results  Lab Results   Component Value Date    WBC 16.2 (H) 03/16/2025    HGB 10.9 (L) 03/16/2025    HCT 31.9 (L) 03/16/2025    MCV 86 03/16/2025     03/16/2025        Lab Results   Component Value Date    GLUCOSE 94 03/16/2025    CALCIUM 8.4 (L) 03/16/2025     (L) 03/16/2025    K 3.4 (L) 03/16/2025    CO2 24 03/16/2025    CL 95 (L) 03/16/2025    BUN 9 03/16/2025    CREATININE 0.81 03/16/2025           Assessment/Plan   Assessment & Plan  Perineal abscess     74 y.o. F with VIN3 s/p WLE in 2021 presenting as transfer for vulvar abscess vs cellulitis.     L Labial Cellulitis, Developing Abscess  - CT with inflammatory changes in L perineum suggestive of early abscess " formation, no evidence of gas  - s/p I&D on 3/15 w/ drainage of significant purulent fluid. Incision appears to be closing, may need to be re-opened if closes entirely.  - Vanc/Zosyn/Clinda initiated at OSH. Discontinued clindamycin (3/12-3/14). Continue Vanc/Zosyn (3/12 - )  - WBC initially downtrending, now decreasing 22>16.   - Pain control with tylenol, ibuprofen, tramadol PRN.      Agitation/confusion  - persistent during the daytime 3/14. Oxybutynin, xanax, flexeril discontinued.   - symptoms improving  - delirium precautions - sitter at bedside, defer ovn vitals     Medical Co-morbidities  - Hypertension- cont home carvedilol. Hold home Lisinopril, Norvasc, Furosemide   - OAB- Oxybutynin cont  - CAD s/p RCA PCI- Nitroglycerin PRN cont, ASA held   - H/o lung CA, COPD- daily inhaler cont, albuterol PRN. Reports intermittent O2 req at home, wean O2 as able, currently on 2LNC.   - H/o TIA- no meds   - H/o nonsustained VT- telemetry   - Depression/Anxiety- Effexor, Xanax cont  - HLD- Lipitor cont     Seen and d/w Dr. Ca Bennett MD PGY-2  Gynecologic Oncology (Pager: [phone])

## 2025-03-17 ENCOUNTER — PHARMACY VISIT (OUTPATIENT)
Dept: PHARMACY | Facility: CLINIC | Age: 75
End: 2025-03-17
Payer: COMMERCIAL

## 2025-03-17 VITALS
TEMPERATURE: 97.5 F | BODY MASS INDEX: 21.49 KG/M2 | DIASTOLIC BLOOD PRESSURE: 63 MMHG | RESPIRATION RATE: 16 BRPM | OXYGEN SATURATION: 94 % | SYSTOLIC BLOOD PRESSURE: 147 MMHG | WEIGHT: 128.97 LBS | HEIGHT: 65 IN | HEART RATE: 73 BPM

## 2025-03-17 LAB
ANION GAP SERPL CALC-SCNC: 15 MMOL/L (ref 10–20)
BACTERIA BLD AEROBE CULT: ABNORMAL
BACTERIA BLD AEROBE CULT: ABNORMAL
BACTERIA BLD CULT: ABNORMAL
BACTERIA BLD CULT: ABNORMAL
BACTERIA SPEC CULT: ABNORMAL
BUN SERPL-MCNC: 8 MG/DL (ref 6–23)
CALCIUM SERPL-MCNC: 8.4 MG/DL (ref 8.6–10.6)
CHLORIDE SERPL-SCNC: 95 MMOL/L (ref 98–107)
CO2 SERPL-SCNC: 24 MMOL/L (ref 21–32)
CREAT SERPL-MCNC: 0.73 MG/DL (ref 0.5–1.05)
EGFRCR SERPLBLD CKD-EPI 2021: 88 ML/MIN/1.73M*2
ERYTHROCYTE [DISTWIDTH] IN BLOOD BY AUTOMATED COUNT: 16.3 % (ref 11.5–14.5)
GLUCOSE SERPL-MCNC: 85 MG/DL (ref 74–99)
GRAM STN SPEC: ABNORMAL
HCT VFR BLD AUTO: 35.8 % (ref 36–46)
HGB BLD-MCNC: 11.5 G/DL (ref 12–16)
MAGNESIUM SERPL-MCNC: 1.94 MG/DL (ref 1.6–2.4)
MCH RBC QN AUTO: 29.2 PG (ref 26–34)
MCHC RBC AUTO-ENTMCNC: 32.1 G/DL (ref 32–36)
MCV RBC AUTO: 91 FL (ref 80–100)
NRBC BLD-RTO: 0 /100 WBCS (ref 0–0)
PLATELET # BLD AUTO: 280 X10*3/UL (ref 150–450)
POTASSIUM SERPL-SCNC: 3.1 MMOL/L (ref 3.5–5.3)
RBC # BLD AUTO: 3.94 X10*6/UL (ref 4–5.2)
SODIUM SERPL-SCNC: 131 MMOL/L (ref 136–145)
WBC # BLD AUTO: 16.5 X10*3/UL (ref 4.4–11.3)

## 2025-03-17 PROCEDURE — 2500000001 HC RX 250 WO HCPCS SELF ADMINISTERED DRUGS (ALT 637 FOR MEDICARE OP)

## 2025-03-17 PROCEDURE — 97161 PT EVAL LOW COMPLEX 20 MIN: CPT | Mod: GP

## 2025-03-17 PROCEDURE — 94640 AIRWAY INHALATION TREATMENT: CPT

## 2025-03-17 PROCEDURE — 2500000002 HC RX 250 W HCPCS SELF ADMINISTERED DRUGS (ALT 637 FOR MEDICARE OP, ALT 636 FOR OP/ED)

## 2025-03-17 PROCEDURE — RXMED WILLOW AMBULATORY MEDICATION CHARGE

## 2025-03-17 PROCEDURE — 80048 BASIC METABOLIC PNL TOTAL CA: CPT

## 2025-03-17 PROCEDURE — 83735 ASSAY OF MAGNESIUM: CPT

## 2025-03-17 PROCEDURE — 0U9MXZZ DRAINAGE OF VULVA, EXTERNAL APPROACH: ICD-10-PCS

## 2025-03-17 PROCEDURE — 85027 COMPLETE CBC AUTOMATED: CPT

## 2025-03-17 PROCEDURE — 2500000004 HC RX 250 GENERAL PHARMACY W/ HCPCS (ALT 636 FOR OP/ED)

## 2025-03-17 PROCEDURE — 36415 COLL VENOUS BLD VENIPUNCTURE: CPT

## 2025-03-17 PROCEDURE — 99238 HOSP IP/OBS DSCHRG MGMT 30/<: CPT

## 2025-03-17 PROCEDURE — 97165 OT EVAL LOW COMPLEX 30 MIN: CPT | Mod: GO

## 2025-03-17 RX ORDER — POTASSIUM CHLORIDE 1.5 G/1.58G
40 POWDER, FOR SOLUTION ORAL ONCE
Status: COMPLETED | OUTPATIENT
Start: 2025-03-17 | End: 2025-03-17

## 2025-03-17 RX ORDER — CLINDAMYCIN HYDROCHLORIDE 150 MG/1
450 CAPSULE ORAL EVERY 8 HOURS SCHEDULED
Qty: 21 CAPSULE | Refills: 0 | Status: SHIPPED | OUTPATIENT
Start: 2025-03-17 | End: 2025-03-20

## 2025-03-17 RX ORDER — ACETAMINOPHEN 325 MG/1
650 TABLET ORAL EVERY 6 HOURS PRN
Qty: 112 TABLET | Refills: 0 | Status: SHIPPED | OUTPATIENT
Start: 2025-03-17 | End: 2025-03-31

## 2025-03-17 RX ORDER — LIDOCAINE HYDROCHLORIDE 10 MG/ML
10 INJECTION, SOLUTION INFILTRATION; PERINEURAL ONCE
Status: DISCONTINUED | OUTPATIENT
Start: 2025-03-17 | End: 2025-03-17 | Stop reason: HOSPADM

## 2025-03-17 RX ORDER — TRAMADOL HYDROCHLORIDE 50 MG/1
50 TABLET ORAL EVERY 8 HOURS PRN
Qty: 10 TABLET | Refills: 0 | Status: SHIPPED | OUTPATIENT
Start: 2025-03-17

## 2025-03-17 RX ADMIN — TIOTROPIUM BROMIDE INHALATION SPRAY 2 PUFF: 3.12 SPRAY, METERED RESPIRATORY (INHALATION) at 09:53

## 2025-03-17 RX ADMIN — PIPERACILLIN SODIUM AND TAZOBACTAM SODIUM 3.38 G: 3; .375 INJECTION, SOLUTION INTRAVENOUS at 11:30

## 2025-03-17 RX ADMIN — ATORVASTATIN CALCIUM 80 MG: 80 TABLET, FILM COATED ORAL at 08:21

## 2025-03-17 RX ADMIN — CLINDAMYCIN HYDROCHLORIDE 450 MG: 150 CAPSULE ORAL at 14:50

## 2025-03-17 RX ADMIN — POTASSIUM CHLORIDE 40 MEQ: 1.5 POWDER, FOR SOLUTION ORAL at 10:14

## 2025-03-17 RX ADMIN — POLYETHYLENE GLYCOL 3350 17 G: 17 POWDER, FOR SOLUTION ORAL at 08:21

## 2025-03-17 RX ADMIN — PIPERACILLIN SODIUM AND TAZOBACTAM SODIUM 3.38 G: 3; .375 INJECTION, SOLUTION INTRAVENOUS at 06:40

## 2025-03-17 RX ADMIN — FORMOTEROL FUMARATE DIHYDRATE 20 MCG: 20 SOLUTION RESPIRATORY (INHALATION) at 09:53

## 2025-03-17 RX ADMIN — HEPARIN SODIUM 5000 UNITS: 5000 INJECTION, SOLUTION INTRAVENOUS; SUBCUTANEOUS at 11:02

## 2025-03-17 RX ADMIN — CARVEDILOL 25 MG: 25 TABLET, FILM COATED ORAL at 08:21

## 2025-03-17 RX ADMIN — TRAMADOL HYDROCHLORIDE 50 MG: 50 TABLET, COATED ORAL at 11:03

## 2025-03-17 RX ADMIN — ACETAMINOPHEN 975 MG: 325 TABLET, FILM COATED ORAL at 08:20

## 2025-03-17 RX ADMIN — HEPARIN SODIUM 5000 UNITS: 5000 INJECTION, SOLUTION INTRAVENOUS; SUBCUTANEOUS at 04:39

## 2025-03-17 RX ADMIN — VENLAFAXINE HYDROCHLORIDE 37.5 MG: 37.5 CAPSULE, EXTENDED RELEASE ORAL at 08:20

## 2025-03-17 ASSESSMENT — COGNITIVE AND FUNCTIONAL STATUS - GENERAL
STANDING UP FROM CHAIR USING ARMS: A LITTLE
CLIMB 3 TO 5 STEPS WITH RAILING: A LITTLE
MOBILITY SCORE: 24
DAILY ACTIVITIY SCORE: 24
WALKING IN HOSPITAL ROOM: A LITTLE
MOBILITY SCORE: 21
DAILY ACTIVITIY SCORE: 24

## 2025-03-17 ASSESSMENT — PAIN - FUNCTIONAL ASSESSMENT
PAIN_FUNCTIONAL_ASSESSMENT: 0-10

## 2025-03-17 ASSESSMENT — PAIN SCALES - GENERAL
PAINLEVEL_OUTOF10: 0 - NO PAIN
PAINLEVEL_OUTOF10: 3
PAINLEVEL_OUTOF10: 5 - MODERATE PAIN

## 2025-03-17 ASSESSMENT — ACTIVITIES OF DAILY LIVING (ADL)
ADL_ASSISTANCE: INDEPENDENT
ADL_ASSISTANCE: INDEPENDENT
BATHING_ASSISTANCE: INDEPENDENT

## 2025-03-17 NOTE — CARE PLAN
The patient's goals for the shift include pain management    The clinical goals for the shift include pt will remain free from falls/injury and pain will be managed      Problem: Pain  Goal: Takes deep breaths with improved pain control throughout the shift  Outcome: Progressing  Goal: Turns in bed with improved pain control throughout the shift  Outcome: Progressing  Goal: Walks with improved pain control throughout the shift  Outcome: Progressing  Goal: Performs ADL's with improved pain control throughout shift  Outcome: Progressing  Goal: Participates in PT with improved pain control throughout the shift  Outcome: Progressing     Problem: Fall/Injury  Goal: Not fall by end of shift  Outcome: Progressing  Goal: Be free from injury by end of the shift  Outcome: Progressing  Goal: Verbalize understanding of personal risk factors for fall in the hospital  Outcome: Progressing  Goal: Verbalize understanding of risk factor reduction measures to prevent injury from fall in the home  Outcome: Progressing  Goal: Use assistive devices by end of the shift  Outcome: Progressing  Goal: Pace activities to prevent fatigue by end of the shift  Outcome: Progressing

## 2025-03-17 NOTE — PROGRESS NOTES
Vancomycin Dosing by Pharmacy- Cessation of Therapy    Consult to pharmacy for vancomycin dosing has been discontinued by the prescriber, pharmacy will sign off at this time.    Please call pharmacy if there are further questions or re-enter a consult if vancomycin is resumed.     Onel Shen, PharmD

## 2025-03-17 NOTE — CARE PLAN
The patient's goals for the shift include pain management    The clinical goals for the shift include patient will remain safe and free from injury throughout shift.      Problem: Pain  Goal: Takes deep breaths with improved pain control throughout the shift  Outcome: Progressing  Goal: Turns in bed with improved pain control throughout the shift  Outcome: Progressing  Goal: Walks with improved pain control throughout the shift  Outcome: Progressing  Goal: Performs ADL's with improved pain control throughout shift  Outcome: Progressing  Goal: Participates in PT with improved pain control throughout the shift  Outcome: Progressing     Problem: Fall/Injury  Goal: Not fall by end of shift  Outcome: Progressing  Goal: Be free from injury by end of the shift  Outcome: Progressing  Goal: Verbalize understanding of personal risk factors for fall in the hospital  Outcome: Progressing  Goal: Verbalize understanding of risk factor reduction measures to prevent injury from fall in the home  Outcome: Progressing  Goal: Use assistive devices by end of the shift  Outcome: Progressing  Goal: Pace activities to prevent fatigue by end of the shift  Outcome: Progressing

## 2025-03-17 NOTE — PROGRESS NOTES
Occupational Therapy    Evaluation    Patient Name: Edu Domingo  MRN: [id number]  Today's Date: 3/17/2025  Room: [address] [place] [address]  Time Calculation  Start Time: 1226  Stop Time: 1246  Time Calculation (min): 20 min    Assessment  IP OT Assessment  OT Assessment: Pt demonstrates ability to participate in ADL/IADL's independently. No acute goals needed and pt appears to be close to baseline. No further acute services indicated at this time.  Prognosis: Good  Barriers to Discharge Home: No anticipated barriers  Evaluation/Treatment Tolerance: Patient tolerated treatment well  Medical Staff Made Aware: Yes  End of Session Communication: Bedside nurse  End of Session Patient Position: Bed, 3 rail up, Alarm off, not on at start of session  Plan:  Inpatient Plan  No Skilled OT: No acute OT goals identified  OT Frequency: OT eval only  OT Discharge Recommendations: No further acute OT, No OT needed after discharge  OT Recommended Transfer Status: Assist of 1  OT - OK to Discharge: Yes  OT Assessment  Prognosis: Good  Barriers to Discharge: None  Evaluation/Treatment Tolerance: Patient tolerated treatment well  Medical Staff Made Aware: Yes  Strengths: Attitude of self, Support of Caregivers, Leisure activity, Housing layout  Barriers to Participation: Comorbidities    Subjective   Current Problem:  1. Perineal abscess          General:  Reason for Referral: vulvar abscess vs cellulitis  Past Medical History Relevant to Rehab: 74-year-old female with a past medical history of right wide excision of anterior vulvar/thigh lesion, right wide excision of the anal lesion and right thigh punch biopsy for DAMIEN 3, lung adenocarcinoma, HTN, GERD, HLD, squamous cell skin cancer, CAD with stents, CKD  Prior to Session Communication: Bedside nurse  Patient Position Received: Bed, 3 rail up, Alarm off, not on at start of session  Family/Caregiver Present: No  General Comment: Pt supine in bed upon arrival. Pt pleasant and agreeable to OT  session   Precautions:  Medical Precautions: Fall precautions  Pain:  Pain Assessment  Pain Assessment: 0-10  0-10 (Numeric) Pain Score: 0 - No pain  Objective   Cognition:  Overall Cognitive Status: Within Functional Limits  Orientation Level: Oriented X4  Following Commands: Follows all commands and directions without difficulty  Insight: Within function limits  Impulsive: Within functional limits  Processing Speed: Within funtional limits  Home Living:  Type of Home: House  Lives With: Spouse ()  Home Adaptive Equipment: None  Home Layout: Two level, Bed/bath upstairs, Able to live on main level with bedroom/bathroom, Full bath main level  Bathroom Shower/Tub: Walk-in shower  Bathroom Toilet: Standard  Bathroom Equipment: Grab bars in shower, Built-in shower seat, Grab bars around toilet   Prior Function:  Level of [place]: Independent with ADLs and functional transfers, Independent with homemaking with ambulation  ADL Assistance: Independent  Homemaking Assistance: Independent  Ambulatory Assistance: Independent  Vocational: Part time employment ()  Leisure: Watch grandsons play sports  Hand Dominance: Right  Prior Function Comments: Pt reports no falls within the past 6 months  IADL History:  Homemaking Responsibilities: Yes  Meal Prep Responsibility: Primary  Laundry Responsibility: Primary  Cleaning Responsibility: Primary  Bill Paying/Finance Responsibility: Primary  Shopping Responsibility: Primary  Current License: Yes  Mode of Transportation: Car  Occupation: Part time employment  Type of Occupation:   Leisure and Hobbies: Watch grandsons play sports  ADL:  Eating Assistance: Independent (Anticipated)  Grooming Assistance: Independent  Bathing Assistance: Independent (Anticipated)  UE Dressing Assistance: Independent (Anticipated)  LE Dressing Assistance: Independent  Toileting Assistance with Device: Independent (Simulated)  Activity Tolerance:  Endurance:  Endurance does not limit participation in activity  Balance:  Dynamic Standing Balance  Dynamic Standing-Balance Support: No upper extremity supported  Dynamic Standing-Level of Assistance: Close supervision  Static Sitting Balance  Static Sitting-Balance Support: Feet supported, No upper extremity supported  Static Sitting-Level of Assistance: Independent  Static Standing Balance  Static Standing-Balance Support: No upper extremity supported  Static Standing-Level of Assistance: Close supervision  Bed Mobility/Transfers: Bed Mobility  Bed Mobility: Yes  Bed Mobility 1  Bed Mobility 1: Supine to sitting, Sitting to supine  Level of Assistance 1: Close supervision  Bed Mobility Comments 1: HOB flat   and Transfers  Transfer: Yes  Transfer 1  Transfer From 1: Sit to, Stand to  Transfer to 1: Stand, Sit  Technique 1: Sit to stand, Stand to sit  Transfer Level of Assistance 1: Close supervision  Trials/Comments 1: x1 with full stand  Transfers 2  Transfer From 2: Bed to, Toilet to  Transfer to 2: Toilet, Bed  Technique 2:  (Ambulating)  Transfer Level of Assistance 2: Close supervision  IADL's:   Homemaking Responsibilities: Yes  Meal Prep Responsibility: Primary  Laundry Responsibility: Primary  Cleaning Responsibility: Primary  Bill Paying/Finance Responsibility: Primary  Shopping Responsibility: Primary  Current License: Yes  Mode of Transportation: Car  Occupation: Part time employment  Type of Occupation:   Leisure and Hobbies: Watch grandsons play sports  Vision: Vision - Basic Assessment  Current Vision: No visual deficits   and    Sensation:  Light Touch: No apparent deficits  Strength:  Strength Comments: LORE WFL  Perception:  Inattention/Neglect: Appears intact  Coordination:  Movements are Fluid and Coordinated: Yes   Hand Function:  Hand Function  Gross Grasp: Functional  Coordination: Functional  Extremities: RUE   RUE : Within Functional Limits, LUE   LUE: Within Functional Limits,  , and       Outcome Measures: [facility] Daily Activity  Putting on and taking off regular lower body clothing: None  Bathing (including washing, rinsing, drying): None  Putting on and taking off regular upper body clothing: None  Toileting, which includes using toilet, bedpan or urinal: None  Taking care of personal grooming such as brushing teeth: None  Eating Meals: None  Daily Activity - Total Score: 24         ,     OT Adult Other Outcome Measures  4AT: Negative    Education Documentation  Body Mechanics, taught by KEYONA Schwab at 3/17/2025  2:35 PM.  Learner: Patient  Readiness: Acceptance  Method: Explanation  Response: Verbalizes Understanding, Demonstrated Understanding    Precautions, taught by KEYONA Schwab at 3/17/2025  2:35 PM.  Learner: Patient  Readiness: Acceptance  Method: Explanation  Response: Verbalizes Understanding, Demonstrated Understanding    ADL Training, taught by KEYONA Schwab at 3/17/2025  2:35 PM.  Learner: Patient  Readiness: Acceptance  Method: Explanation  Response: Verbalizes Understanding, Demonstrated Understanding    Education Comments  No comments found.      03/17/25 at 2:35 PM   KEYONA SCHWAB   Rehab Office: [phone]

## 2025-03-17 NOTE — PROGRESS NOTES
Physical Therapy    Physical Therapy Evaluation    Patient Name: Edu Domingo  MRN: [id number]  Department: [facility]  Room: 6022/6022-A  Today's Date: 3/17/2025   Time Calculation  Start Time: 0908  Stop Time: 0926  Time Calculation (min): 18 min    Assessment/Plan   PT Assessment  PT Assessment Results: Decreased strength, Decreased range of motion, Decreased endurance, Impaired balance, Decreased mobility  Rehab Prognosis: Excellent  Barriers to Discharge Home: No anticipated barriers  Evaluation/Treatment Tolerance: Patient tolerated treatment well, Patient limited by fatigue  End of Session Communication: Bedside nurse  Assessment Comment: 74 y.o. s/p I&D near groin otherwise demonstrating grossly stand by/CGA with mobility and function. Pt will benefit from continued PT in house to esure safe return home. Otherwise no post acute PT needs.  End of Session Patient Position: Bed, 3 rail up, Alarm off, not on at start of session  IP OR SWING BED PT PLAN  Inpatient or Swing Bed: Inpatient  PT Plan  Treatment/Interventions: Bed mobility, Transfer training, Gait training, Balance training, Strengthening, Therapeutic exercise, Therapeutic activity  PT Plan: Ongoing PT  PT Frequency: 3 times per week  PT Discharge Recommendations: No PT needed after discharge  PT Recommended Transfer Status: Contact guard, Stand by assist  PT - OK to Discharge: Yes    Subjective   General Visit Information:  General  Reason for Referral: vulvar abscess vs cellulitis  Past Medical History Relevant to Rehab: 74 y.o. F with VIN3 s/p WLE in 2021 presenting as transfer for vulvar abscess vs cellulitis. s/p I&D on 3/15/25  Family/Caregiver Present: No  Prior to Session Communication: Bedside nurse  Patient Position Received: Bed, 3 rail up, Alarm off, not on at start of session  Preferred Learning Style: verbal, auditory  General Comment: Pt resting in bed upon entry. Pleasant and cooperative. Willing to participate with PT. Hopeful for discharge  "home soon. Pt remarked, \"I'd get up more if they let me!\" Advised patient to call out for assist when wantin to mobilize.  Home Living:  Home Living  Type of Home: House  Lives With: Spouse  Home Adaptive Equipment: None  Home Layout:  (No concerns)  Home Access: No concerns  Home Living Comments: Pt reports Mother stays with patient at home and is in hospice care.  Prior Level of Function:  Prior Function Per Pt/Caregiver Report  Level of [place]: Independent with ADLs and functional transfers  ADL Assistance: Independent  Homemaking Assistance: Independent  Ambulatory Assistance: Independent  Prior Function Comments: No concerns with mobility at baseline  Precautions:  Precautions  Medical Precautions: Fall precautions     Objective   Pain:  Pain Assessment  Pain Assessment: 0-10  0-10 (Numeric) Pain Score: 3  Pain Type: Acute pain  Pain Location: Groin  Pain Interventions:  (Pt reports taking Tylenol 1 hr ago.)  Cognition:  Cognition  Overall Cognitive Status: Within Functional Limits  Orientation Level: Oriented X4  Attention: Within Functional Limits  Safety/Judgement: Within Functional Limits  Insight: Within function limits  Impulsive: Within functional limits    General Assessments:     Activity Tolerance  Endurance: Decreased tolerance for upright activites    Sensation  Light Touch: No apparent deficits    Strength  Strength Comments: Grossly >4+/5 throughout BLE  Strength  Strength Comments: Grossly >4+/5 throughout BLE    Perception  Inattention/Neglect: Appears intact      Coordination  Movements are Fluid and Coordinated: Yes    Postural Control  Postural Control: Within Functional Limits    Static Sitting Balance  Static Sitting-Balance Support: Feet supported  Static Sitting-Level of Assistance: Close supervision    Static Standing Balance  Static Standing-Balance Support: No upper extremity supported  Static Standing-Level of Assistance: Close supervision  Functional Assessments:       Bed " Mobility  Bed Mobility: Yes  Bed Mobility 1  Bed Mobility 1: Supine to sitting, Sitting to supine  Level of Assistance 1: Close supervision    Transfers  Transfer: Yes  Transfer 1  Transfer From 1: Sit to, Stand to  Transfer to 1: Stand, Sit  Transfer Device 1:  (no device)  Transfer Level of Assistance 1: Contact guard    Ambulation/Gait Training  Ambulation/Gait Training Performed: Yes  Ambulation/Gait Training 1  Surface 1: Level tile  Device 1: No device  Assistance 1: Contact guard  Quality of Gait 1: Decreased step length, Forward flexed posture  Comments/Distance (ft) 1: 100ft    Outcome Measures:  [facility] Basic Mobility  Turning from your back to your side while in a flat bed without using bedrails: None  Moving from lying on your back to sitting on the side of a flat bed without using bedrails: None  Moving to and from bed to chair (including a wheelchair): None  Standing up from a chair using your arms (e.g. wheelchair or bedside chair): A little  To walk in hospital room: A little  Climbing 3-5 steps with railing: A little  Basic Mobility - Total Score: 21    Encounter Problems       Encounter Problems (Active)       Mobility       STG - Patient will ambulate >200ft, no device, independent       Start:  03/17/25    Expected End:  03/31/25            STG - Patient will ascend and descend four to six stairs independent       Start:  03/17/25    Expected End:  03/31/25               PT Transfers       STG - Patient to transfer to and from sit to supine independent       Start:  03/17/25    Expected End:  03/31/25            STG - Patient will transfer sit to and from stand idependent       Start:  03/17/25    Expected End:  03/31/25                   Education Documentation  Precautions, taught by Nikhil San, PT at 3/17/2025  9:46 AM.  Learner: Patient  Readiness: Acceptance  Method: Explanation  Response: Verbalizes Understanding    Mobility Training, taught by Nikhil San, PT at 3/17/2025  9:46  RADHA  Learner: Patient  Readiness: Acceptance  Method: Explanation  Response: Verbalizes Understanding    Education Comments  No comments found.

## 2025-03-17 NOTE — NURSING NOTE
Edu Domingo discharged at 4:56 PM  and 03/17/25   Patient discharged home.  Discharge education and teaching completed with Edu Domingo and S/O.

## 2025-03-17 NOTE — PROGRESS NOTES
"Edu Domingo is a 74 y.o. female on day 4 of admission presenting with Perineal abscess.    Subjective   Pt feels pain is improving, feels vulva is less tense. Denies fevers, chills, n/v. Tolerating PO. Had BM over the weekend, feeling less constipated.          Objective     General: resting comfortably, NAD  HEENT: normocephalic, atraumatic  Heart: warm and well perfused, RRR  Lungs: no increased WOB on RA, CTAB  Abd: soft, minimally tender over suprapubic region, otherwise nontender  : L labia with improving erythema and induration, induration extending from mons toward posterior fourchette, incision at base open with small amount of purulent fluid able to be expressed. No areas of fluctuance or crepitus noted. Minimally tender   Extremities: moving all extremities  Neuro: awake and conversant  Psych: appropriate mood    Last Recorded Vitals  Blood pressure 157/61, pulse 89, temperature 37 °C (98.6 °F), temperature source Temporal, resp. rate 20, height 1.651 m (5' 5\"), weight 58.5 kg (128 lb 15.5 oz), SpO2 94%.  Intake/Output last 3 Shifts:  I/O last 3 completed shifts:  In: 1620 (31.9 mL/kg) [P.O.:520; IV Piggyback:1100]  Out: - (0 mL/kg)   Weight: 50.8 kg     Relevant Results  Lab Results   Component Value Date    WBC 16.2 (H) 03/16/2025    HGB 10.9 (L) 03/16/2025    HCT 31.9 (L) 03/16/2025    MCV 86 03/16/2025     03/16/2025        Lab Results   Component Value Date    GLUCOSE 94 03/16/2025    CALCIUM 8.4 (L) 03/16/2025     (L) 03/16/2025    K 3.4 (L) 03/16/2025    CO2 24 03/16/2025    CL 95 (L) 03/16/2025    BUN 9 03/16/2025    CREATININE 0.81 03/16/2025           Assessment/Plan   Assessment & Plan  Perineal abscess     74 y.o. F with VIN3 s/p WLE in 2021 presenting as transfer for vulvar abscess vs cellulitis.     Vulvar Abscess  - CT with inflammatory changes in L perineum suggestive of early abscess formation, no evidence of gas  - s/p I&D on 3/15 w/ drainage of significant purulent " fluid, culture growing staph aureus. Incision healing but continuing to drain, may need to re-open if closes. Wound cs pending.   - Vanc/Zosyn/Clinda initiated at OSH. Discontinued clindamycin (3/12-3/14). Continue Vanc/Zosyn (3/12 - ). Will fu culture sensitivities for PO regimen.   - WBC downtrending after I&D, 22 > 16.   - Pain control - tylenol, ibuprofen, tramadol PRN.      Agitation/confusion, resolved.   - now improved. Oxybutynin, xanax, flexeril discontinued.   - delirium precautions - sitter at bedside, defer ovn vitals     Medical Co-morbidities  - Hypertension- cont home carvedilol. Hold home Lisinopril, Norvasc, Furosemide   - OAB- Oxybutynin held  - CAD s/p RCA PCI- Nitroglycerin PRN cont, ASA held   - H/o lung CA, COPD- daily inhaler cont, albuterol PRN. Reports intermittent O2 req at home, wean O2 as able, intermittently on 2LNC.   - H/o TIA- no meds   - H/o nonsustained VT- telemetry   - Depression/Anxiety- Effexor cont, Xanax held  - HLD- Lipitor cont     Seen and d/w Dr. Ca Cherry MD  PGY3, Gynecologic Oncology  Pager [phone]

## 2025-03-17 NOTE — DISCHARGE INSTRUCTIONS
Discharge Instructions     Call if you have:  Vaginal bleeding soaking >2 pads per hour for 2 hours  Temperature greater than 100.4  Persistent nausea and vomiting  Severe uncontrolled pain  Difficulty breathing, headache or visual disturbances  Hives  Persistent dizziness or light-headedness  Extreme fatigue  Any other questions or concerns you may have after discharge    You can reach our office or pager at [phone]. In an emergency, call 911 or go to an Emergency Department at a nearby hospital.

## 2025-03-17 NOTE — DISCHARGE SUMMARY
Discharge Diagnosis  Perineal abscess    Issues Requiring Follow-Up  Perineal wound care    Test Results Pending At Discharge  Pending Labs       No current pending labs.            Hospital Course  74 y.o. female who is s/p WLE for VIN3 in 2021 who presented as transfer for vulvar abscess vs cellulitis. Pt presented to OSH with enlarged L labia and drainage of fluid. CT scan demonstrated possible early abscess formation. She was initiated on IV vancomycin, zosyn, and clindamycin and transferred. On arrival to [facility], her wound had minimal drainage, with no areas of fluctuance appreciated, no signs of necrotizing fasciitis. She was continued on IV antibiotics with improvement in her leukocytosis, and was de-escalated to only vancomycin and zosyn on HD1. On HD2, a bedside I&D was performed with drainage of purulent fluid, the wound was noted to track ~1cm superolaterally and was packed. On HD4, another bedside I&D was performed with minimal drainage and significant clinical improvement. Wound cultures grew staph aureus sensitive to clindamycin, which she was transitioned to on HD4. She was discharged with a 7 day total course of antibiotics prescribed, and with close follow-up outpatient with Gyn Onc for re-evaluation and repacking of her wound. Her hospital stay was also notable for delirium, for which possible affecting medications including her home xanax, oxybutynin, flexeril, and IV narcotics were held with complete resolution of her symptoms. She was discharged with a 7 day total course of antibiotics prescribed, and with close follow-up outpatient with Gyn Onc for evaluation and repacking of her wound.    Pertinent Physical Exam At Time of Discharge  General: resting comfortably, NAD  HEENT: normocephalic, atraumatic  Heart: warm and well perfused, RRR  Lungs: no increased WOB on RA, CTAB  Abd: soft, minimally tender over suprapubic region, otherwise nontender  : L labia with improving erythema and induration,  induration extending from mons toward posterior fourchette, incision at base open with small amount of purulent fluid able to be expressed, packed with small packing tape. No areas of fluctuance or crepitus noted. Minimally tender   Extremities: moving all extremities  Neuro: awake and conversant  Psych: appropriate mood  Home Medications     Medication List      START taking these medications     clindamycin 150 mg capsule; Commonly known as: Cleocin; Take 3 capsules   (450 mg) by mouth every 8 hours for 7 doses.     CHANGE how you take these medications     acetaminophen 325 mg tablet; Commonly known as: TylenoL; Take 2 tablets   (650 mg) by mouth every 6 hours if needed for moderate pain (4 - 6) for up   to 14 days.; What changed: medication strength, how much to take, when to   take this, reasons to take this     CONTINUE taking these medications     albuterol 90 mcg/actuation inhaler; INHALE 2 PUFFS BY MOUTH AS   INSTRUCTED EVERY 4 HOURS AS NEEDED FOR WHEEZING / SHORTNESS OF BREATH   amLODIPine 5 mg tablet; Commonly known as: Norvasc; Take 1 tablet (5 mg)   by mouth once daily.   ammonium lactate 12 % lotion; Commonly known as: Lac-Hydrin; Apply   topically if needed for dry skin.   aspirin 81 mg EC tablet; Take 1 tablet (81 mg) by mouth once daily.   atorvastatin 80 mg tablet; Commonly known as: Lipitor; Take 1 tablet (80   mg) by mouth once daily.   carvedilol 25 mg tablet; Commonly known as: Coreg; Take 1 tablet (25 mg)   by mouth 2 times daily (morning and late afternoon).   celecoxib 200 mg capsule; Commonly known as: CeleBREX; Take 1 capsule   (200 mg) by mouth once daily as needed for moderate pain (4 - 6).   cholecalciferol 50 MCG (2000 UT) tablet; Commonly known as: Vitamin D-3   furosemide 20 mg tablet; Commonly known as: Lasix; Take 1 tablet (20 mg)   by mouth once daily.   lisinopril 20 mg tablet; Take 1 tablet (20 mg) by mouth once daily.   nitroglycerin 0.4 mg SL tablet; Commonly known as:  Nitrostat; Place 1   tablet (0.4 mg) under the tongue every 5 minutes if needed (angina). Times   three PRN   oxybutynin XL 10 mg 24 hr tablet; Commonly known as: Ditropan-XL; Take 1   tablet (10 mg) by mouth once daily. Do not crush, chew, or split.   oxygen gas therapy; Commonly known as: O2   traMADol 50 mg tablet; Commonly known as: Ultram; Take 1 tablet (50 mg)   by mouth every 8 hours if needed for severe pain (7 - 10).   venlafaxine XR 37.5 mg 24 hr capsule; Commonly known as: Effexor-XR;   Take 1 capsule (37.5 mg) by mouth once daily. Do not crush or chew.   ZYRTEC ORAL     STOP taking these medications     ALPRAZolam 0.5 mg tablet; Commonly known as: Xanax   doxycycline 100 mg capsule; Commonly known as: Monodox     ASK your doctor about these medications     Anoro Ellipta 62.5-25 mcg/actuation blister with device; Generic drug:   umeclidinium-vilanteroL; INHALE 1 PUFF BY MOUTH AS INSTRUCTED ONCE DAILY.   hydrocortisone 2.5 % cream; Apply topically 2 times a day as needed for   irritation or rash.   magnesium oxide 400 mg tablet; Commonly known as: Mag-Ox; Take 1 tablet   (400 mg) by mouth once daily.       Outpatient Follow-Up  Future Appointments   Date Time Provider Department Center   3/19/2025  2:20 PM MAYI Moss [id number] [place]   4/25/2025 11:30 AM Abram Salmeron DO [id number] [place]   6/3/2025 11:30 AM Zahra Harman MD [id number] [place]   6/18/2025  9:20 AM MAYI Moss [id number] [place]   6/19/2025 11:40 AM MAYI Moss [id number] [facility]   7/24/2025  3:30 PM Christophe Wood MD [id number] [place]     Seen and d/w Dr. Ca Cherry MD

## 2025-03-17 NOTE — PROGRESS NOTES
Vancomycin Dosing by Pharmacy- FOLLOW UP    Edu Domingo is a 74 y.o. year old female who Pharmacy has been consulted for vancomycin dosing for cellulitis, skin and soft tissue. Based on the patient's indication and renal status this patient is being dosed based on a goal AUC of 400-600.     Renal function is currently stable.    Current vancomycin dose: 1500 mg given every 24 hours    Estimated vancomycin AUC on current dose: 510 mg/L.hr     Visit Vitals  /53 (BP Location: Right arm, Patient Position: Lying)   Pulse 79   Temp 37.4 °C (99.3 °F) (Temporal)   Resp 20        Lab Results   Component Value Date    CREATININE 0.81 2025    CREATININE 0.87 03/15/2025    CREATININE 0.96 2025    CREATININE 0.87 2025        Patient weight is as follows:   Vitals:    25 2328   Weight: 50.8 kg (112 lb)       Cultures:  Susceptibility data for the encounter in last 14 days.  Collected Specimen Info Organism   03/15/25 Tissue/Biopsy from Other (specify in comments) Staphylococcus aureus        I/O last 3 completed shifts:  In: 1620 (31.9 mL/kg) [P.O.:520; IV Piggyback:1100]  Out: - (0 mL/kg)   Weight: 50.8 kg   I/O during current shift:  No intake/output data recorded.    Temp (24hrs), Av.8 °C (98.3 °F), Min:36.3 °C (97.3 °F), Max:37.4 °C (99.3 °F)      Assessment/Plan    Within goal AUC range. Continue current vancomycin regimen.    This dosing regimen is predicted by [employer] to result in the following pharmacokinetic parameters:    Loading dose: N/A  Regimen: 1500 mg IV every 24 hours.  Start time: 13:08 on 2025  Exposure target: AUC24 (range)400-600 mg/L.hr   ZNS12-65: 505 mg/L.hr  AUC24,ss: 510 mg/L.hr  Probability of AUC24 > 400: 99 %  Ctrough,ss: 12.3 mg/L  Probability of Ctrough,ss > 20: 1 %    The next level will be obtained on 3/18 at 1800. May be obtained sooner if clinically indicated.   Will continue to monitor renal function daily while on vancomycin and order serum  creatinine at least every 48 hours if not already ordered.  Follow for continued vancomycin needs, clinical response, and signs/symptoms of toxicity.       KARLI THORNTON

## 2025-03-17 NOTE — SIGNIFICANT EVENT
I&D Procedure Note    Indications: Vulvar abscess    Procedure Details   Pt was placed in lithotomy position, prior scar noted to be healing, minimal drainage. The area was prepped with betadine. 10cc of lidocaine was injected at the superior and inferior aspects of the prior incision. A 1cm incision was made extending the prior incision. Minimal drainage. A sterile swab probed the wound with minimal tracking toward 2 o'clock, no tracking noted elsewhere. The wound was irrigated and packed. The wound was hemostatic, the patient tolerated the procedure well.     Condition:  stable    Complications:  None; patient tolerated the procedure well.    Plan:    Continue daily dressing changes while inpatient, will arrange close outpatient follow-up for dressing change after discharge  Wound care consult pending  Will transition to PO clindamycin    Seen and d/w Dr. Thomas Cherry MD  PGY3, Gynecologic Oncology  Pager [phone]

## 2025-03-19 ENCOUNTER — OFFICE VISIT (OUTPATIENT)
Dept: GYNECOLOGIC ONCOLOGY | Facility: CLINIC | Age: 75
End: 2025-03-19
Payer: MEDICARE

## 2025-03-19 VITALS
HEART RATE: 84 BPM | TEMPERATURE: 98.1 F | DIASTOLIC BLOOD PRESSURE: 64 MMHG | OXYGEN SATURATION: 90 % | SYSTOLIC BLOOD PRESSURE: 149 MMHG | RESPIRATION RATE: 16 BRPM | WEIGHT: 117.06 LBS | BODY MASS INDEX: 19.48 KG/M2

## 2025-03-19 DIAGNOSIS — N90.3 VULVAR DYSPLASIA: ICD-10-CM

## 2025-03-19 DIAGNOSIS — N76.4 VULVAR ABSCESS: Primary | ICD-10-CM

## 2025-03-19 PROCEDURE — 1125F AMNT PAIN NOTED PAIN PRSNT: CPT | Performed by: NURSE PRACTITIONER

## 2025-03-19 PROCEDURE — 99211 OFF/OP EST MAY X REQ PHY/QHP: CPT | Performed by: NURSE PRACTITIONER

## 2025-03-19 PROCEDURE — 1111F DSCHRG MED/CURRENT MED MERGE: CPT | Performed by: NURSE PRACTITIONER

## 2025-03-19 PROCEDURE — 1159F MED LIST DOCD IN RCRD: CPT | Performed by: NURSE PRACTITIONER

## 2025-03-19 PROCEDURE — 3078F DIAST BP <80 MM HG: CPT | Performed by: NURSE PRACTITIONER

## 2025-03-19 PROCEDURE — 3077F SYST BP >= 140 MM HG: CPT | Performed by: NURSE PRACTITIONER

## 2025-03-19 PROCEDURE — 1123F ACP DISCUSS/DSCN MKR DOCD: CPT | Performed by: NURSE PRACTITIONER

## 2025-03-19 ASSESSMENT — PAIN SCALES - GENERAL: PAINLEVEL_OUTOF10: 4

## 2025-03-19 NOTE — PROGRESS NOTES
Patient ID: Edu Domingo is a 74 y.o. female.  Primary Care Provider: DO Oneil Aviles    Referred by Dr. 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    21 R vulvar biopsy:  DAMIEN-3 with HPV cytopathic effect.    1/ PPD smoker x 30 years     PMH:  Hypertension, overactive bladder, hypercholesterolemia, anxiety. History of lung cancer in  (underwent LL lobectomy followed by chemo)- per patient they did not think lung was the primary. History of skin cancer on legs.    PSH: Left lower lobectomy , right arm ganglion cyst removal, lesions excised on legs for skin cancer     Family history: Mother with rectal cancer, father with pancreatic cancer, maternal aunt breast, maternal aunt cervix cancer    ,  x2. No abnormal paps last in . Menopause in .       Objective    Visit Vitals  /64 (BP Location: Right arm, Patient Position: Sitting, BP Cuff Size: Adult)   Pulse 84   Temp 36.7 °C (98.1 °F) (Temporal)   Resp 16            Interval history: Patient is a 74 year old female s/p R wide excision of anterior vulvar/thigh lesion, R wide excision of perianal lesion, and R thigh punch biopsy on 2021 for VIN3.      Patient admitted last week for recurrent vulvar abscess, now s/p IV antibiotics and wound I&D with packing on 3/15/25.  Here for wound check.    Patient states she has been taking the antibiotics.  Her pain is being well managed with Tramadol.   She has decreased appetite.  Her energy is still decreased, needing to take naps.   Taking Miralax daily for constipation, not had a bowel movement since home.   Patient denies any bladder issues.  Denies any vaginal bleeding.   Patients  is helping her at home.  Denies any fevers at home.        Physical Exam:    Constitutional: Alert and oriented.    Eyes: PERRL  ENMT: Moist mucus membranes  Head/Neck: Supple. Symmetrical  Genitourinary:    Well healed incision on right.    Left vulva with erythema and induration spreading to left mons and left perineum, decreased from last week.  Left perineum with 1.5 cm wide incision, depth 1.5 cm, no tunneling noted, no packing upon arrival, no further drainage with probing noted.    Musculoskeletal: ROM intact, no joint swelling, normal strength  Extremities: No edema  Neurological: Alert and oriented x 3. Pleasant and cooperative.  Psychological: Appropriate mood and behavior  Skin: Warm and dry, no lesions, no rashes    A complete review of systems was performed and all systems were normal except what is noted in the interval history.        Assessment/Plan  Patient is a 74 year old female s/p R wide excision of anterior vulvar/thigh lesion, R wide excision of perianal lesion, and R thigh punch biopsy on 6/2/2021 for VIN3. Vulvar abscess healing    Plan:   F/U in 1 week or as needed for wound check  High protein diet  Increase Miralax to twice a day  Continue with course of antibiotics

## 2025-03-24 NOTE — PROGRESS NOTES
Patient ID: Edu Domingo is a 74 y.o. female.  Primary Care Provider: DO Oneil Aviles    Referred by Dr. 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    21 R vulvar biopsy:  DAMIEN-3 with HPV cytopathic effect.    1/ PPD smoker x 30 years     PMH:  Hypertension, overactive bladder, hypercholesterolemia, anxiety. History of lung cancer in  (underwent LL lobectomy followed by chemo)- per patient they did not think lung was the primary. History of skin cancer on legs.    PSH: Left lower lobectomy , right arm ganglion cyst removal, lesions excised on legs for skin cancer     Family history: Mother with rectal cancer, father with pancreatic cancer, maternal aunt breast, maternal aunt cervix cancer    ,  x2. No abnormal paps last in . Menopause in .       Objective    There were no vitals taken for this visit.           Interval history: Patient is a 74 year old female s/p R wide excision of anterior vulvar/thigh lesion, R wide excision of perianal lesion, and R thigh punch biopsy on 2021 for VIN3.      Patient admitted last week for recurrent vulvar abscess, now s/p IV antibiotics and wound I&D with packing on 3/15/25.  Here for wound check.    Patient states she has been taking the antibiotics.  Her pain is being well managed with Tramadol.   She has decreased appetite.  Her energy is still decreased, needing to take naps.   Taking Miralax daily for constipation, not had a bowel movement since home.   Patient denies any bladder issues.  Denies any vaginal bleeding.   Patients  is helping her at home.  Denies any fevers at home.        Physical Exam:    Constitutional: Alert and oriented.    Eyes: PERRL  ENMT: Moist mucus membranes  Head/Neck: Supple. Symmetrical  Genitourinary:    Well healed incision on right.   Left vulva with erythema and induration spreading to left mons and left perineum, decreased from last week.   Left perineum with 1.5 cm wide incision, depth 1.5 cm, no tunneling noted, no packing upon arrival, no further drainage with probing noted.    Musculoskeletal: ROM intact, no joint swelling, normal strength  Extremities: No edema  Neurological: Alert and oriented x 3. Pleasant and cooperative.  Psychological: Appropriate mood and behavior  Skin: Warm and dry, no lesions, no rashes    A complete review of systems was performed and all systems were normal except what is noted in the interval history.        Assessment/Plan  Patient is a 74 year old female s/p R wide excision of anterior vulvar/thigh lesion, R wide excision of perianal lesion, and R thigh punch biopsy on 6/2/2021 for VIN3. Vulvar abscess healing    Plan:   F/U in 1 week or as needed for wound check  High protein diet  Increase Miralax to twice a day  Continue with course of antibiotics

## 2025-03-25 ENCOUNTER — APPOINTMENT (OUTPATIENT)
Dept: GYNECOLOGIC ONCOLOGY | Facility: CLINIC | Age: 75
End: 2025-03-25
Payer: MEDICARE

## 2025-03-26 ENCOUNTER — LAB (OUTPATIENT)
Dept: LAB | Facility: CLINIC | Age: 75
End: 2025-03-26
Payer: MEDICARE

## 2025-03-26 ENCOUNTER — OFFICE VISIT (OUTPATIENT)
Dept: GYNECOLOGIC ONCOLOGY | Facility: CLINIC | Age: 75
End: 2025-03-26
Payer: MEDICARE

## 2025-03-26 ENCOUNTER — APPOINTMENT (OUTPATIENT)
Dept: GYNECOLOGIC ONCOLOGY | Facility: CLINIC | Age: 75
End: 2025-03-26
Payer: MEDICARE

## 2025-03-26 VITALS
HEART RATE: 69 BPM | RESPIRATION RATE: 17 BRPM | DIASTOLIC BLOOD PRESSURE: 63 MMHG | TEMPERATURE: 97.5 F | BODY MASS INDEX: 18.38 KG/M2 | OXYGEN SATURATION: 93 % | SYSTOLIC BLOOD PRESSURE: 158 MMHG | WEIGHT: 110.45 LBS

## 2025-03-26 DIAGNOSIS — R53.83 OTHER FATIGUE: ICD-10-CM

## 2025-03-26 DIAGNOSIS — N76.4 VULVAR ABSCESS: ICD-10-CM

## 2025-03-26 DIAGNOSIS — Z51.89 VISIT FOR WOUND CHECK: Primary | ICD-10-CM

## 2025-03-26 LAB
ANION GAP SERPL CALC-SCNC: 12 MMOL/L (ref 10–20)
BASOPHILS # BLD AUTO: 0.11 X10*3/UL (ref 0–0.1)
BASOPHILS NFR BLD AUTO: 2 %
BUN SERPL-MCNC: 26 MG/DL (ref 6–23)
CALCIUM SERPL-MCNC: 9.3 MG/DL (ref 8.6–10.3)
CHLORIDE SERPL-SCNC: 96 MMOL/L (ref 98–107)
CO2 SERPL-SCNC: 31 MMOL/L (ref 21–32)
CREAT SERPL-MCNC: 0.88 MG/DL (ref 0.5–1.05)
EGFRCR SERPLBLD CKD-EPI 2021: 70 ML/MIN/1.73M*2
EOSINOPHIL # BLD AUTO: 0.09 X10*3/UL (ref 0–0.4)
EOSINOPHIL NFR BLD AUTO: 1.7 %
ERYTHROCYTE [DISTWIDTH] IN BLOOD BY AUTOMATED COUNT: 16.3 % (ref 11.5–14.5)
GLUCOSE SERPL-MCNC: 81 MG/DL (ref 74–99)
HCT VFR BLD AUTO: 34.7 % (ref 36–46)
HGB BLD-MCNC: 10.9 G/DL (ref 12–16)
IMM GRANULOCYTES # BLD AUTO: 0.01 X10*3/UL (ref 0–0.5)
IMM GRANULOCYTES NFR BLD AUTO: 0.2 % (ref 0–0.9)
LYMPHOCYTES # BLD AUTO: 2.18 X10*3/UL (ref 0.8–3)
LYMPHOCYTES NFR BLD AUTO: 40.3 %
MCH RBC QN AUTO: 29.3 PG (ref 26–34)
MCHC RBC AUTO-ENTMCNC: 31.4 G/DL (ref 32–36)
MCV RBC AUTO: 93 FL (ref 80–100)
MONOCYTES # BLD AUTO: 0.46 X10*3/UL (ref 0.05–0.8)
MONOCYTES NFR BLD AUTO: 8.5 %
NEUTROPHILS # BLD AUTO: 2.56 X10*3/UL (ref 1.6–5.5)
NEUTROPHILS NFR BLD AUTO: 47.3 %
PLATELET # BLD AUTO: 563 X10*3/UL (ref 150–450)
POTASSIUM SERPL-SCNC: 4.5 MMOL/L (ref 3.5–5.3)
RBC # BLD AUTO: 3.72 X10*6/UL (ref 4–5.2)
SODIUM SERPL-SCNC: 134 MMOL/L (ref 136–145)
TSH SERPL-ACNC: 1.58 MIU/L (ref 0.44–3.98)
WBC # BLD AUTO: 5.4 X10*3/UL (ref 4.4–11.3)

## 2025-03-26 PROCEDURE — 3077F SYST BP >= 140 MM HG: CPT | Performed by: STUDENT IN AN ORGANIZED HEALTH CARE EDUCATION/TRAINING PROGRAM

## 2025-03-26 PROCEDURE — 80048 BASIC METABOLIC PNL TOTAL CA: CPT

## 2025-03-26 PROCEDURE — 1126F AMNT PAIN NOTED NONE PRSNT: CPT | Performed by: STUDENT IN AN ORGANIZED HEALTH CARE EDUCATION/TRAINING PROGRAM

## 2025-03-26 PROCEDURE — 1123F ACP DISCUSS/DSCN MKR DOCD: CPT | Performed by: STUDENT IN AN ORGANIZED HEALTH CARE EDUCATION/TRAINING PROGRAM

## 2025-03-26 PROCEDURE — 99214 OFFICE O/P EST MOD 30 MIN: CPT | Performed by: STUDENT IN AN ORGANIZED HEALTH CARE EDUCATION/TRAINING PROGRAM

## 2025-03-26 PROCEDURE — 3078F DIAST BP <80 MM HG: CPT | Performed by: STUDENT IN AN ORGANIZED HEALTH CARE EDUCATION/TRAINING PROGRAM

## 2025-03-26 PROCEDURE — 82607 VITAMIN B-12: CPT | Performed by: FAMILY MEDICINE

## 2025-03-26 PROCEDURE — 1111F DSCHRG MED/CURRENT MED MERGE: CPT | Performed by: STUDENT IN AN ORGANIZED HEALTH CARE EDUCATION/TRAINING PROGRAM

## 2025-03-26 PROCEDURE — 1159F MED LIST DOCD IN RCRD: CPT | Performed by: STUDENT IN AN ORGANIZED HEALTH CARE EDUCATION/TRAINING PROGRAM

## 2025-03-26 PROCEDURE — 85025 COMPLETE CBC W/AUTO DIFF WBC: CPT

## 2025-03-26 PROCEDURE — 36415 COLL VENOUS BLD VENIPUNCTURE: CPT

## 2025-03-26 PROCEDURE — 84443 ASSAY THYROID STIM HORMONE: CPT

## 2025-03-26 ASSESSMENT — PAIN SCALES - GENERAL: PAINLEVEL_OUTOF10: 0-NO PAIN

## 2025-03-26 NOTE — PROGRESS NOTES
Patient ID: Edu Domingo is a 74 y.o. female.  Primary Care Provider: DO Oneil Aviles    Referred by Dr. 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    21 R vulvar biopsy:  DAMIEN-3 with HPV cytopathic effect.    1/ PPD smoker x 30 years     PMH:  Hypertension, overactive bladder, hypercholesterolemia, anxiety. History of lung cancer in  (underwent LL lobectomy followed by chemo)- per patient they did not think lung was the primary. History of skin cancer on legs.    PSH: Left lower lobectomy , right arm ganglion cyst removal, lesions excised on legs for skin cancer     Family history: Mother with rectal cancer, father with pancreatic cancer, maternal aunt breast, maternal aunt cervix cancer    ,  x2. No abnormal paps last in . Menopause in .       Objective    There were no vitals taken for this visit.      Interval history: Patient is a 74 year old female s/p R wide excision of anterior vulvar/thigh lesion, R wide excision of perianal lesion, and R thigh punch biopsy on 2021 for VIN3.      Patient here on follow up for wound check. She was admitted last week for recurrent vulvar abscess, now s/p IV antibiotics and wound I&D with packing on 3/15/25.     Patient is off antibiotics. Her pain is being well managed with Tramadol.   She has decreased appetite.  Her energy remains poor.  Vulvar erythema much improved but remains indurated. Patient denies any bladder issues.  Denies any vaginal bleeding. No fevers.      Physical Exam:    Constitutional: Alert and oriented.    Eyes: PERRL  ENMT: Moist mucus membranes  Head/Neck: Supple. Symmetrical  Genitourinary:    Well healed incision on right.   Left vulva erythema markedly improved though significant induration remains. Incision well healed and closed. Unable to probe.   Musculoskeletal: ROM intact, no joint swelling, normal strength  Extremities: No edema  Neurological: Alert  and oriented x 3. Pleasant and cooperative.  Psychological: Appropriate mood and behavior  Skin: Warm and dry, no lesions, no rashes    A complete review of systems was performed and all systems were normal except what is noted in the interval history.        Assessment/Plan  Patient is a 74 year old female s/p R wide excision of anterior vulvar/thigh lesion, R wide excision of perianal lesion, and R thigh punch biopsy on 6/2/2021 for VIN3. Vulvar abscess healing. Fatigue ongoing, suspect multifactorial due to prolonged hospitalization and vulvar abscess.     Plan:    -  F/U in 1 mo for wound check  - Consider repeat imaging if induration has not significantly improved on follow up   - repeat labs ordered     Lupis Penaloza MD MPH

## 2025-03-28 ENCOUNTER — OFFICE VISIT (OUTPATIENT)
Dept: PRIMARY CARE | Facility: CLINIC | Age: 75
End: 2025-03-28
Payer: MEDICARE

## 2025-03-28 VITALS
SYSTOLIC BLOOD PRESSURE: 130 MMHG | WEIGHT: 114 LBS | TEMPERATURE: 97.6 F | DIASTOLIC BLOOD PRESSURE: 70 MMHG | BODY MASS INDEX: 18.97 KG/M2

## 2025-03-28 DIAGNOSIS — D07.1 VULVAR INTRAEPITHELIAL NEOPLASIA (VIN) GRADE 3: ICD-10-CM

## 2025-03-28 DIAGNOSIS — N76.4 VULVAR ABSCESS: Primary | ICD-10-CM

## 2025-03-28 DIAGNOSIS — R53.83 FATIGUE, UNSPECIFIED TYPE: ICD-10-CM

## 2025-03-28 DIAGNOSIS — D64.9 ANEMIA, UNSPECIFIED TYPE: ICD-10-CM

## 2025-03-28 LAB — VIT B12 SERPL-MCNC: 1519 PG/ML (ref 211–911)

## 2025-03-28 PROCEDURE — 99215 OFFICE O/P EST HI 40 MIN: CPT | Performed by: FAMILY MEDICINE

## 2025-03-28 PROCEDURE — 3075F SYST BP GE 130 - 139MM HG: CPT | Performed by: FAMILY MEDICINE

## 2025-03-28 PROCEDURE — 1159F MED LIST DOCD IN RCRD: CPT | Performed by: FAMILY MEDICINE

## 2025-03-28 PROCEDURE — 1111F DSCHRG MED/CURRENT MED MERGE: CPT | Performed by: FAMILY MEDICINE

## 2025-03-28 PROCEDURE — 1123F ACP DISCUSS/DSCN MKR DOCD: CPT | Performed by: FAMILY MEDICINE

## 2025-03-28 PROCEDURE — 1160F RVW MEDS BY RX/DR IN RCRD: CPT | Performed by: FAMILY MEDICINE

## 2025-03-28 PROCEDURE — 3078F DIAST BP <80 MM HG: CPT | Performed by: FAMILY MEDICINE

## 2025-03-28 NOTE — PROGRESS NOTES
"Subjective   Patient ID: [id number]     Edu Domingo is a 74 y.o. female who presents for Hospital Follow-up.  HPI  She is here for a recheck after being in the hospital.      She was hospitalized from 3/12/25 to 3/17/25'  the DC summary from 3/17/25 was reviewed today.    She has a history of DAMIEN 3 who presented with swelling and drainage of the left labia at external genitalia.  CT scan showed abscess formation.  She was treated with IV vancomycin, Zosyn and clingamycin.  She was transferred to [facility].  Upon arrival at [facility], her wound had minimal drainage with no areas of fluctuance or signs of necrotizing fasciitis.  She was continued on the IV antibiotics and improvement was noted in her leukocytosis.  Clindamycin was discontinued after the first day of being at [facility].  She was continued on vancomycin and Zosyn.  On her second day at [facility] a bedside I and D was performed and there was drainage of purulent fluid.  On the fourth day a second I and D was performed.  Cultures grew staph aureus.  She was discharged with a seven day course of antibiotics.  She was given follow up with Gyn Onc for reevaluation and repacking of her wound. It was reported that she suffered delirium while in the hospital.  Xanax, oxybutynin, flexeril and IV narcotics were held due to the delirium.  She was discharged on oral clindamycin.      She saw gyn onc a couple of days ago.  She states it looked good. She left it without packing.    Pt reports improvement in her pain and swelling in the genital area.      Her infection feels well but her main complaint is that \"I am just physically exhausted\".      Her legs wobble with just walking across the room.  Her gynecologist ordered labs evaluating her complains of fatigue.    TSH, BMP unremarkable.  CBC showed significant improvement in her high WBC count.  From 20s to 5.  CBC showed anemia.  Hgb 10.9.      Denies nausea or diarrhea or abd pain.  She is done with the " clindamycin.      Objective     /70 (BP Location: Right arm, Patient Position: Sitting)   Temp 36.4 °C (97.6 °F) (Skin)   Wt 51.7 kg (114 lb)   LMP  (LMP Unknown)   BMI 18.97 kg/m²      Physical Exam  Constitutional:       Appearance: Normal appearance.   Cardiovascular:      Rate and Rhythm: Normal rate and regular rhythm.      Heart sounds: Normal heart sounds. No murmur heard.  Pulmonary:      Effort: Pulmonary effort is normal. No respiratory distress.      Breath sounds: Normal breath sounds.   Abdominal:      General: Abdomen is flat.      Palpations: Abdomen is soft.      Tenderness: There is no abdominal tenderness. There is no guarding or rebound.      Comments: Firm swelling in the left inguinal region.   Genitourinary:     Comments: Left vulva not indurated or swollen at the site of injection but induration and swelling in noted extending to the left inguinal region.  Gyn onc plans on an ultrasound of this area at her next recheck in April.      Musculoskeletal:      Cervical back: Neck supple.   Lymphadenopathy:      Cervical: No cervical adenopathy.   Neurological:      General: No focal deficit present.      Mental Status: She is alert and oriented to person, place, and time.         Assessment/Plan   Problem List Items Addressed This Visit       Vulvar intraepithelial neoplasia (DAMIEN) grade 3     Other Visit Diagnoses       Vulvar abscess    -  Primary    Fatigue, unspecified type        Relevant Orders    Iron and TIBC    Vitamin B12    Anemia, unspecified type        Relevant Orders    Iron and TIBC    Vitamin B12          I offered PT which you decline for now.  I encourage caloric intake.  Continue the protein supplements.  Some this the fatigue may resolve with being out of the hospital and off the antibiotics and moving around more.  Safe exercise will likely help this.  Your labs showed improvement of your white count.  You have anemia. Iron counts and a B12 level were added on the  blood same you gave two days ago. If low, these will be replaced.      Your infection appears to be getting better.  There is some swelling in the left groin that should be followed up on. You will see gyn onc next month and an ultrasound is planned if that swelling is still there to be sure it is not anything cancerous, which at this time I doubt.  Return after you see gyn onc next in a month or so.  Abram Salmeron, DO

## 2025-03-28 NOTE — PATIENT INSTRUCTIONS
I offered PT which you decline for now.  I encourage caloric intake.  Continue the protein supplements.  Some this the fatigue may resolve with being out of the hospital and off the antibiotics and moving around more.  Safe exercise will likely help this.  Your labs showed improvement of your white count.  You have anemia. Iron counts and a B12 level were added on the blood same you gave two days ago. If low, these will be replaced.      Your infection appears to be getting better.  There is some swelling in the left groin that should be followed up on. You will see gyn onc next month and an ultrasound is planned if that swelling is still there to be sure it is not anything cancerous, which at this time I doubt.  Return after you see gyn onc next in a month or so.

## 2025-03-31 DIAGNOSIS — D50.8 OTHER IRON DEFICIENCY ANEMIA: Primary | ICD-10-CM

## 2025-04-03 ENCOUNTER — TELEPHONE (OUTPATIENT)
Dept: GYNECOLOGIC ONCOLOGY | Facility: HOSPITAL | Age: 75
End: 2025-04-03
Payer: MEDICARE

## 2025-04-03 NOTE — TELEPHONE ENCOUNTER
"Patient called to report increased left vulvar redness, and \"pimple\" fluid filled lump Patient states she noticed increased redness yesterday.    + vulvar/perineal pressure, denies increased pain.   Denies drainage.    Has completed ATB  prescribed at time of hospital stay, discharged on 3/17/25.   Denies fever but reports feeling hot and cold and intermittent chills that she states she has had since diagnosis of vulvar abscess in March.    Message routed to Dr. Penaloza and Nicky Rodriguez CNP    [phone]  Phoned patient to notify her that Dr. Penaloza recommends office visit this week.   Appointment with Dr. Penaloza for this afternoon offered to patient.    Patient declined appointment stating [facility] location is too far to drive and she did not think she could drive that far due to symptoms.    Patients  currently admitted to hospital per patient.    Patient requesting appointment at [facility].   Notified patient first available appointment is for 4/7 with Beth Anguiano CNP.  Appointment scheduled for 10am.     Patient states she will also reach out to her PCP today to see if he will see her in the office today.  Advised ER with increased pain, redness, vulvar drainage, or fever.       "

## 2025-04-04 ENCOUNTER — OFFICE VISIT (OUTPATIENT)
Dept: PRIMARY CARE | Facility: CLINIC | Age: 75
End: 2025-04-04
Payer: MEDICARE

## 2025-04-04 VITALS
BODY MASS INDEX: 17.97 KG/M2 | DIASTOLIC BLOOD PRESSURE: 80 MMHG | TEMPERATURE: 97.6 F | WEIGHT: 108 LBS | SYSTOLIC BLOOD PRESSURE: 130 MMHG

## 2025-04-04 DIAGNOSIS — D64.9 ANEMIA, UNSPECIFIED TYPE: ICD-10-CM

## 2025-04-04 DIAGNOSIS — N76.4 VULVAR ABSCESS: Primary | ICD-10-CM

## 2025-04-04 DIAGNOSIS — D07.1 VULVAR INTRAEPITHELIAL NEOPLASIA (VIN) GRADE 3: ICD-10-CM

## 2025-04-04 PROCEDURE — 99214 OFFICE O/P EST MOD 30 MIN: CPT | Performed by: FAMILY MEDICINE

## 2025-04-04 PROCEDURE — 1160F RVW MEDS BY RX/DR IN RCRD: CPT | Performed by: FAMILY MEDICINE

## 2025-04-04 PROCEDURE — 1123F ACP DISCUSS/DSCN MKR DOCD: CPT | Performed by: FAMILY MEDICINE

## 2025-04-04 PROCEDURE — 3075F SYST BP GE 130 - 139MM HG: CPT | Performed by: FAMILY MEDICINE

## 2025-04-04 PROCEDURE — 3079F DIAST BP 80-89 MM HG: CPT | Performed by: FAMILY MEDICINE

## 2025-04-04 PROCEDURE — 1159F MED LIST DOCD IN RCRD: CPT | Performed by: FAMILY MEDICINE

## 2025-04-04 PROCEDURE — 1111F DSCHRG MED/CURRENT MED MERGE: CPT | Performed by: FAMILY MEDICINE

## 2025-04-04 RX ORDER — DOXYCYCLINE 100 MG/1
100 CAPSULE ORAL 2 TIMES DAILY
Qty: 20 CAPSULE | Refills: 0 | Status: SHIPPED | OUTPATIENT
Start: 2025-04-04 | End: 2025-04-14

## 2025-04-04 RX ORDER — AMOXICILLIN AND CLAVULANATE POTASSIUM 875; 125 MG/1; MG/1
875 TABLET, FILM COATED ORAL 2 TIMES DAILY
Qty: 20 TABLET | Refills: 0 | Status: SHIPPED | OUTPATIENT
Start: 2025-04-04 | End: 2025-04-14

## 2025-04-04 NOTE — PATIENT INSTRUCTIONS
I ordered two different antibiotics for your infection.  I recommend warm compressed to the area.  Follow up with gynecology/oncology as scheduled on Monday 4/7/25.

## 2025-04-04 NOTE — PROGRESS NOTES
"Subjective   Patient ID: [id number]     Edu Domingo is a 74 y.o. female who presents for skin inflammation (Vaginal Area.  Cyst opened and is re-filling.).  HPI  She complains of swelling in the area of the vulvar abscess that required hospitalization last month.  She was hospitalized in mid March and treated with Zosyn and Vancomycin for the abscess.  I saw her on 3/28/25 and the swelling was improving but still definitely there.    She had a \"white pimple\" in the area last night.  There is some redness that has come back.    She has a gyn onc appointment on Monday.  They suggested that she come here today for a recheck.     No fevers.     Objective     /80 (BP Location: Right arm, Patient Position: Sitting)   Temp 36.4 °C (97.6 °F) (Skin)   Wt 49 kg (108 lb)   LMP  (LMP Unknown)   BMI 17.97 kg/m²      Physical Exam  Constitutional:       General: She is not in acute distress.     Appearance: She is ill-appearing. She is not toxic-appearing.   Genitourinary:     Comments: Left vulva mild redness and swelling--not pressurized. Incision not presently indicated.  There is a firm swelling in the left inguinal region.  Possible adenopathy.  Not red or tender.  ?extension of infection vs lymph node metastasis of vulvar neoplasm.      Neurological:      Mental Status: She is alert.         Assessment/Plan   Problem List Items Addressed This Visit    None  Visit Diagnoses       Vulvar abscess    -  Primary    Relevant Medications    doxycycline (Vibramycin) 100 mg capsule    amoxicillin-pot clavulanate (Augmentin) 875-125 mg tablet          I ordered two different antibiotics for your infection.  I recommend warm compressed to the area.  Follow up with gynecology/oncology as scheduled on Monday 4/7/25.    Abram Salmeron, DO   "

## 2025-04-05 LAB
IRON SATN MFR SERPL: 10 % (CALC) (ref 16–45)
IRON SERPL-MCNC: 40 MCG/DL (ref 45–160)
TIBC SERPL-MCNC: 384 MCG/DL (CALC) (ref 250–450)

## 2025-04-07 ENCOUNTER — OFFICE VISIT (OUTPATIENT)
Dept: GYNECOLOGIC ONCOLOGY | Facility: CLINIC | Age: 75
End: 2025-04-07
Payer: MEDICARE

## 2025-04-07 ENCOUNTER — LAB (OUTPATIENT)
Dept: LAB | Facility: CLINIC | Age: 75
End: 2025-04-07
Payer: MEDICARE

## 2025-04-07 VITALS
WEIGHT: 108.25 LBS | OXYGEN SATURATION: 96 % | DIASTOLIC BLOOD PRESSURE: 66 MMHG | BODY MASS INDEX: 18.01 KG/M2 | HEART RATE: 73 BPM | TEMPERATURE: 96.8 F | SYSTOLIC BLOOD PRESSURE: 149 MMHG | RESPIRATION RATE: 16 BRPM

## 2025-04-07 DIAGNOSIS — N76.4 VULVAR ABSCESS: ICD-10-CM

## 2025-04-07 DIAGNOSIS — N76.4 VULVAR ABSCESS: Primary | ICD-10-CM

## 2025-04-07 DIAGNOSIS — N90.3 VULVAR DYSPLASIA: ICD-10-CM

## 2025-04-07 LAB
ERYTHROCYTE [DISTWIDTH] IN BLOOD BY AUTOMATED COUNT: 15.7 % (ref 11.5–14.5)
HCT VFR BLD AUTO: 42.6 % (ref 36–46)
HGB BLD-MCNC: 13.6 G/DL (ref 12–16)
MCH RBC QN AUTO: 29.8 PG (ref 26–34)
MCHC RBC AUTO-ENTMCNC: 31.9 G/DL (ref 32–36)
MCV RBC AUTO: 93 FL (ref 80–100)
PLATELET # BLD AUTO: 255 X10*3/UL (ref 150–450)
RBC # BLD AUTO: 4.57 X10*6/UL (ref 4–5.2)
WBC # BLD AUTO: 7.3 X10*3/UL (ref 4.4–11.3)

## 2025-04-07 PROCEDURE — G2211 COMPLEX E/M VISIT ADD ON: HCPCS | Performed by: NURSE PRACTITIONER

## 2025-04-07 PROCEDURE — 1111F DSCHRG MED/CURRENT MED MERGE: CPT | Performed by: NURSE PRACTITIONER

## 2025-04-07 PROCEDURE — 1123F ACP DISCUSS/DSCN MKR DOCD: CPT | Performed by: NURSE PRACTITIONER

## 2025-04-07 PROCEDURE — 99214 OFFICE O/P EST MOD 30 MIN: CPT | Performed by: NURSE PRACTITIONER

## 2025-04-07 PROCEDURE — 85027 COMPLETE CBC AUTOMATED: CPT

## 2025-04-07 PROCEDURE — 3077F SYST BP >= 140 MM HG: CPT | Performed by: NURSE PRACTITIONER

## 2025-04-07 PROCEDURE — 3078F DIAST BP <80 MM HG: CPT | Performed by: NURSE PRACTITIONER

## 2025-04-07 PROCEDURE — 1126F AMNT PAIN NOTED NONE PRSNT: CPT | Performed by: NURSE PRACTITIONER

## 2025-04-07 PROCEDURE — 1159F MED LIST DOCD IN RCRD: CPT | Performed by: NURSE PRACTITIONER

## 2025-04-07 PROCEDURE — 36415 COLL VENOUS BLD VENIPUNCTURE: CPT

## 2025-04-07 RX ORDER — FERROUS SULFATE 325(65) MG
325 TABLET, DELAYED RELEASE (ENTERIC COATED) ORAL
Qty: 90 TABLET | Refills: 2 | Status: SHIPPED | OUTPATIENT
Start: 2025-04-07 | End: 2025-07-06

## 2025-04-07 ASSESSMENT — PAIN SCALES - GENERAL: PAINLEVEL_OUTOF10: 0-NO PAIN

## 2025-04-07 NOTE — PROGRESS NOTES
Patient ID: Edu Domingo is a 74 y.o. female.  Primary Care Provider: DO Oneil Aviles    Referred by Dr. 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    21 R vulvar biopsy:  DAMIEN-3 with HPV cytopathic effect.    1/ PPD smoker x 30 years     PMH:  Hypertension, overactive bladder, hypercholesterolemia, anxiety. History of lung cancer in  (underwent LL lobectomy followed by chemo)- per patient they did not think lung was the primary. History of skin cancer on legs.    PSH: Left lower lobectomy , right arm ganglion cyst removal, lesions excised on legs for skin cancer     Family history: Mother with rectal cancer, father with pancreatic cancer, maternal aunt breast, maternal aunt cervix cancer    ,  x2. No abnormal paps last in . Menopause in .       Objective    Visit Vitals  /66   Pulse 73   Temp 36 °C (96.8 °F)   Resp 16         Interval history: Patient is a 74 year old female s/p R wide excision of anterior vulvar/thigh lesion, R wide excision of perianal lesion, and R thigh punch biopsy on 2021 for VIN3.      Patient here on follow up for wound check. She was admitted in March for recurrent vulvar abscess, now s/p IV antibiotics and wound I&D with packing on 3/15/25.     Patient called office last week with concern for recurrent vulvar abscess. She was able to get in with her PCP on  and was started on doxycycline and Augmentin. She reports some improvement in symptoms. Reports intermittent chills recently, no fevers. She is concerned about generalized unintentional weight loss.  Patient denies any bladder issues.  Denies any vaginal bleeding.     Physical Exam:    Constitutional: Alert and oriented.    Eyes: PERRL  ENMT: Moist mucus membranes  Head/Neck: Supple. Symmetrical  Genitourinary:   Well healed incision on right. Left vulva with generalized erythema and mild induration. 1mm scab noted right anterior  vulva. No drainage noted. Unable to probe. Firmness appreciated left inguinal region concerning for enlarged lymph node.   Musculoskeletal: ROM intact, no joint swelling, normal strength  Extremities: No edema  Neurological: Alert and oriented x 3. Pleasant and cooperative.  Psychological: Appropriate mood and behavior  Skin: Warm and dry, no lesions, no rashes    A complete review of systems was performed and all systems were normal except what is noted in the interval history.    Assessment/Plan  Patient is a 74 year old female s/p R wide excision of anterior vulvar/thigh lesion, R wide excision of perianal lesion, and R thigh punch biopsy on 6/2/2021 for VIN3. Vulvar abscess healing. Fatigue ongoing, suspect multifactorial due to prolonged hospitalization and vulvar abscess.     Plan:    -  CT A/P due to recurrent abscesses and induration, possible enlarged inguinal node  - repeat CBC ordered, WBC WNL  - Phone visit after CT to discuss  - Continue oral antibiotics as prescribed by PCP  - ED precautions reviewed    Beth Anguiano, SPIKE-CNP

## 2025-04-11 ENCOUNTER — TELEPHONE (OUTPATIENT)
Dept: PRIMARY CARE | Facility: CLINIC | Age: 75
End: 2025-04-11
Payer: MEDICARE

## 2025-04-11 ENCOUNTER — HOSPITAL ENCOUNTER (OUTPATIENT)
Dept: RADIOLOGY | Facility: HOSPITAL | Age: 75
Discharge: HOME | End: 2025-04-11
Payer: MEDICARE

## 2025-04-11 DIAGNOSIS — F41.9 ANXIETY: Primary | ICD-10-CM

## 2025-04-11 DIAGNOSIS — N76.4 VULVAR ABSCESS: ICD-10-CM

## 2025-04-11 PROCEDURE — 74177 CT ABD & PELVIS W/CONTRAST: CPT

## 2025-04-11 PROCEDURE — 2550000001 HC RX 255 CONTRASTS: Performed by: NURSE PRACTITIONER

## 2025-04-11 RX ORDER — ALPRAZOLAM 0.5 MG/1
0.5 TABLET ORAL 3 TIMES DAILY PRN
Qty: 30 TABLET | Refills: 0 | Status: SHIPPED | OUTPATIENT
Start: 2025-04-11 | End: 2025-12-07

## 2025-04-11 RX ADMIN — IOHEXOL 75 ML: 350 INJECTION, SOLUTION INTRAVENOUS at 17:04

## 2025-04-11 NOTE — TELEPHONE ENCOUNTER
Pt called wanting a refill on a med that you discontinued.  She insists that you have been prescribing it.      Aprazolam 0.5 mg   1 tab po 3 times a day.  LF:3/17/25    Pharmacy: [facility]  Ph: [phone]    LV: 4/4/25  NV: 5/9/25

## 2025-04-14 ENCOUNTER — TELEMEDICINE (OUTPATIENT)
Dept: GYNECOLOGIC ONCOLOGY | Facility: CLINIC | Age: 75
End: 2025-04-14
Payer: MEDICARE

## 2025-04-14 DIAGNOSIS — N76.4 VULVAR ABSCESS: Primary | ICD-10-CM

## 2025-04-14 PROCEDURE — G2211 COMPLEX E/M VISIT ADD ON: HCPCS | Performed by: NURSE PRACTITIONER

## 2025-04-14 PROCEDURE — 1123F ACP DISCUSS/DSCN MKR DOCD: CPT | Performed by: NURSE PRACTITIONER

## 2025-04-14 PROCEDURE — 1111F DSCHRG MED/CURRENT MED MERGE: CPT | Performed by: NURSE PRACTITIONER

## 2025-04-14 PROCEDURE — 99213 OFFICE O/P EST LOW 20 MIN: CPT | Performed by: NURSE PRACTITIONER

## 2025-04-14 NOTE — PROGRESS NOTES
Consent:  Verbal consent was requested and obtained from patient on this date for a telehealth visit.    Patient ID: Edu Domingo is a 74 y.o. female.  Referring Physician: No referring provider defined for this encounter.  Primary Care Provider: Abram Salmeron DO    Subjective    HPI    Referred by Dr. 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     21 R vulvar biopsy:  DAMIEN-3 with HPV cytopathic effect.      PPD smoker x 30 years     Interval History: Patient is a 74 year old female s/p R wide excision of anterior vulvar/thigh lesion, R wide excision of perianal lesion, and R thigh punch biopsy on 2021 for VIN3.       Patient presenting for phone visit to discuss recent imaging. She was admitted in March for recurrent vulvar abscess, now s/p IV antibiotics and wound I&D with packing on 3/15/25. Patient was seen last week with concern for recurrent vulvar abscess. She was able to get in with her PCP on  and was started on doxycycline and Augmentin. She now reports overall improvement in symptoms. Denies any ongoing vulvar pain. Has one day of antibiotics left. Patient denies any bladder issues. Denies any vaginal bleeding.      PMH:  Hypertension, overactive bladder, hypercholesterolemia, anxiety. History of lung cancer in  (underwent LL lobectomy followed by chemo)- per patient they did not think lung was the primary. History of skin cancer on legs.     PSH: Left lower lobectomy , right arm ganglion cyst removal, lesions excised on legs for skin cancer      Family history: Mother with rectal cancer, father with pancreatic cancer, maternal aunt breast, maternal aunt cervix cancer     ,  x2. No abnormal paps last in 2018. Menopause in .         Objective    BSA: There is no height or weight on file to calculate BSA.  LMP  (LMP Unknown)      Physical Exam    CT abdomen pelvis w IV contrast  Narrative: Interpreted By:  Andriy Salmeron,    STUDY:  CT ABDOMEN PELVIS W IV CONTRAST;  4/11/2025 5:25 pm      INDICATION:  Signs/Symptoms:vulvar abscess, enlarged inguinal lymph node on exam.      ,N76.4 Abscess of vulva      COMPARISON:  CT abdomen and pelvis with contrast 12 March 2025; all other CTs back  to the oldest available, 12 April 2013      ACCESSION NUMBER(S):  [id number]      ORDERING CLINICIAN:  MIKE COTTON      TECHNIQUE:  CT of the abdomen and pelvis from the lung bases through the  symphysis pubis after the uneventful administration of intravenous  contrast (75 mL Omnipaque 350). No oral contrast.      FINDINGS:  LOWER CHEST: Trace pericardial effusion and tiny left pleural  effusion are both unchanged. No acute airspace disease      BONES: No acute skeletal findings.      LIVER: Normal. No enlargement or evidence of cirrhosis or fatty  change. No mass or other suspect lesion.      SPLEEN: Normal. No enlargement, mass or evidence of splenic vein  thrombosis.      PANCREAS: Normal. No CT evidence of acute or chronic pancreatitis. No  duct dilation. No mass.      GALLBLADDER: CT cannot exclude stones, but being collapsed, there is  no acute cholecystitis      BILE DUCTS: Minimal intrahepatic prominence is probably this  patient's normal physiology, unchanged going back across multiple  comparison exams and no dilation of the extrahepatic duct      ADRENAL GLANDS: Normal. No nodule or mass.      KIDNEYS AND URETERS: Normal.  No hydronephrosis on either side.  No  mass.  Symmetric enhancement.  No infarct or CT evidence of acute  pyelonephritis.  No substantial radiodense stone.  Tiny stones and  radiolucent stones could be occult on CT.      LYMPH NODES: No adenopathy, intraperitoneal, retroperitoneal, pelvic  or otherwise      APPENDIX: Normal.  Not dilated, thick walled or in any other way  inflamed in appearance.  No inflammatory change about the appendix.      COLON: Insert loaded with solid stool, none dilated or inflamed       SMALL BOWEL: Normal. No small bowel dilation or any other sign of  small bowel obstruction. No sign of active inflammatory bowel disease.      STOMACH / DUODENUM: Grossly normal by CT which has limited  sensitivity and specificity for the stomach and duodenum.      RETROPERITONEUM: Normal.  No acute hemorrhage or inflammatory change.  Lymph nodes in a separate dedicated section.      OMENTUM, MESENTERY AND PERITONEAL SPACES:  Free intraperitoneal air: Negative  Free intraperitoneal fluid: Negative  Abscess: Negative  Other: n/a      URINARY BLADDER: Normal. No wall thickening, large diverticula,  radiodense stone or surrounding inflammatory change.      PELVIS: No obvious acute adnexal abnormality by CT      VASCULATURE: Aortic and iliac atherosclerotic calcifications without  aneurysm or other acute finding. No high grade stenosis of the major  abdominal aortic branch vessels. Portal venous system patent.      ABDOMINAL WALL:  Hernia: Negative  Other: Impression of this report      Impression: I suspect any palpable inguinal abnormality is residual phlegmon near  the left side of the symphysis pubis, which I have annotated with a  [place] on today's exam      There is a morphologically normal, normal for short axis diameter  lymph node just cephalad from the phlegmon. This lymph node has been  unchanged going back years      There are residual inflammatory changes in the left side of the vulva  but no discrete fluid collection      No acute unexpected findings in the abdomen or pelvis proper, only an  impressive colonic stool burden without stercoral or other colitis      MACRO:  None      Signed by: Andriy Salmeron 4/12/2025 8:16 AM  Dictation workstation:   [id number]    Performance Status:  Asymptomatic    Assessment/Plan      Patient is a 74 year old female s/p R wide excision of anterior vulvar/thigh lesion, R wide excision of perianal lesion, and R thigh punch biopsy on 6/2/2021 for VIN3. Vulvar abscess  healing.      Plan:    -  CT A/P reviewed, no evidence of residual vulvar abscess or lymphadenopathy   - CBC ordered, WBC WNL  - Follow up for routine surveillance visit in June or sooner if needed  - Instructed to call with any concerns or recurrent vulvar abscess    I performed this visit using real-time telehealth tools, including an audio/video connection between the patient and myself. I spent 7 minutes virtually with this patient and/or family. More than 50% of the time was spent in counseling and/or coordination of care.     Beth Anguiano, SPIKE-CNP

## 2025-04-25 ENCOUNTER — APPOINTMENT (OUTPATIENT)
Dept: PRIMARY CARE | Facility: CLINIC | Age: 75
End: 2025-04-25
Payer: MEDICARE

## 2025-05-06 NOTE — PROGRESS NOTES
Patient ID: Edu Domingo is a 74 y.o. female.  Primary Care Provider: DO Oneil Aviles    Referred by Dr. 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    21 R vulvar biopsy:  DAMIEN-3 with HPV cytopathic effect.    1/2 PPD smoker x 30 years     PMH:  Hypertension, overactive bladder, hypercholesterolemia, anxiety. History of lung cancer in  (underwent LL lobectomy followed by chemo)- per patient they did not think lung was the primary. History of skin cancer on legs.    PSH: Left lower lobectomy , right arm ganglion cyst removal, lesions excised on legs for skin cancer     Family history: Mother with rectal cancer, father with pancreatic cancer, maternal aunt breast, maternal aunt cervix cancer    ,  x2. No abnormal paps last in . Menopause in .       Objective    Visit Vitals  /68 (BP Location: Right arm, Patient Position: Sitting, BP Cuff Size: Adult)   Pulse 74   Temp 36.5 °C (97.7 °F) (Temporal)   Resp 16         Interval history: Patient is a 74 year old female s/p R wide excision of anterior vulvar/thigh lesion, R wide excision of perianal lesion, and R thigh punch biopsy on 2021 for VIN3.      Patient here for follow up for wound check. She was admitted in March for recurrent vulvar abscess, now s/p IV antibiotics and wound I&D with packing on 3/15/25.     Here for wound check.  Patient states her pain has resolved.  She has started on Vitamin B-12 supplements also.  Her energy is improving daily.  She has OAB, takes Oxybutynin.   Patient no longer having any discharge or bleeding.       Physical Exam:    Constitutional: Alert and oriented.    Eyes: PERRL  ENMT: Moist mucus membranes  Head/Neck: Supple. Symmetrical  Genitourinary:   Well healed incision on right. Left vulva healed abscess, induration resolved, no erythema noted.   Musculoskeletal: ROM intact, no joint swelling, normal strength  Extremities: No  edema  Neurological: Alert and oriented x 3. Pleasant and cooperative.  Psychological: Appropriate mood and behavior  Skin: Warm and dry, no lesions, no rashes    A complete review of systems was performed and all systems were normal except what is noted in the interval history.    Assessment/Plan  Patient is a 74 year old female s/p R wide excision of anterior vulvar/thigh lesion, R wide excision of perianal lesion, and R thigh punch biopsy on 6/2/2021 for VIN3. Healed vulvar abscess.      Plan:  F/U in 6 months or as needed      Nicky Rodriguez, APRN-CNP

## 2025-05-07 ENCOUNTER — OFFICE VISIT (OUTPATIENT)
Dept: GYNECOLOGIC ONCOLOGY | Facility: CLINIC | Age: 75
End: 2025-05-07
Payer: MEDICARE

## 2025-05-07 VITALS
BODY MASS INDEX: 18.05 KG/M2 | RESPIRATION RATE: 16 BRPM | HEART RATE: 74 BPM | OXYGEN SATURATION: 98 % | WEIGHT: 108.47 LBS | DIASTOLIC BLOOD PRESSURE: 68 MMHG | SYSTOLIC BLOOD PRESSURE: 157 MMHG | TEMPERATURE: 97.7 F

## 2025-05-07 DIAGNOSIS — F32.0 DEPRESSION, MAJOR, SINGLE EPISODE, MILD (CMS-HCC): ICD-10-CM

## 2025-05-07 DIAGNOSIS — N32.81 OAB (OVERACTIVE BLADDER): ICD-10-CM

## 2025-05-07 PROCEDURE — 3078F DIAST BP <80 MM HG: CPT | Performed by: NURSE PRACTITIONER

## 2025-05-07 PROCEDURE — 1159F MED LIST DOCD IN RCRD: CPT | Performed by: NURSE PRACTITIONER

## 2025-05-07 PROCEDURE — 3077F SYST BP >= 140 MM HG: CPT | Performed by: NURSE PRACTITIONER

## 2025-05-07 PROCEDURE — 99211 OFF/OP EST MAY X REQ PHY/QHP: CPT | Performed by: NURSE PRACTITIONER

## 2025-05-07 PROCEDURE — 1126F AMNT PAIN NOTED NONE PRSNT: CPT | Performed by: NURSE PRACTITIONER

## 2025-05-07 RX ORDER — VENLAFAXINE HYDROCHLORIDE 37.5 MG/1
37.5 CAPSULE, EXTENDED RELEASE ORAL DAILY
Qty: 30 CAPSULE | Refills: 11 | Status: SHIPPED | OUTPATIENT
Start: 2025-05-07

## 2025-05-07 RX ORDER — OXYBUTYNIN CHLORIDE 10 MG/1
10 TABLET, EXTENDED RELEASE ORAL DAILY
Qty: 30 TABLET | Refills: 11 | Status: SHIPPED | OUTPATIENT
Start: 2025-05-07 | End: 2026-05-07

## 2025-05-07 ASSESSMENT — PAIN SCALES - GENERAL: PAINLEVEL_OUTOF10: 0-NO PAIN

## 2025-05-09 ENCOUNTER — TELEPHONE (OUTPATIENT)
Dept: PRIMARY CARE | Facility: CLINIC | Age: 75
End: 2025-05-09

## 2025-05-09 ENCOUNTER — APPOINTMENT (OUTPATIENT)
Dept: PRIMARY CARE | Facility: CLINIC | Age: 75
End: 2025-05-09
Payer: MEDICARE

## 2025-05-09 DIAGNOSIS — F41.9 ANXIETY: ICD-10-CM

## 2025-05-09 DIAGNOSIS — J42 CHRONIC BRONCHITIS, UNSPECIFIED CHRONIC BRONCHITIS TYPE (MULTI): ICD-10-CM

## 2025-05-09 RX ORDER — ALPRAZOLAM 0.5 MG/1
0.5 TABLET ORAL 3 TIMES DAILY PRN
Qty: 30 TABLET | Refills: 0 | Status: SHIPPED | OUTPATIENT
Start: 2025-05-09 | End: 2026-01-04

## 2025-05-09 RX ORDER — ALBUTEROL SULFATE 90 UG/1
INHALANT RESPIRATORY (INHALATION)
Qty: 18 G | Refills: 0 | Status: SHIPPED | OUTPATIENT
Start: 2025-05-09

## 2025-05-09 NOTE — TELEPHONE ENCOUNTER
Rx Refill Request Telephone Encounter    Name:  Edu Domingo  :  [id number]  Medication Name:  ALPRAZolam (Xanax)   Dose: 0.5 mg   Route: oral   Frequency: 3 times daily PRN for anxiety  Dispense Quantity: 30 tablet (10 day supply) Refills: 0   Duration: 240 days   Dispense As Written: No        Sig: Take 1 tablet (0.5 mg) by mouth 3 times a day as needed for anxiety.    Last refill: 25    Rx Refill Request Telephone Encounter    Name:  Edu Domingo  :  [id number]  Medication Name:  albuterol 90 mcg/actuation inhaler       Dose, Route, Frequency: As Directed    Dispense Quantity: 18 g   Refills: 0   Duration: --   Dispense As Written: Yes        Sig: INHALE 2 PUFFS BY MOUTH AS INSTRUCTED EVERY 4 HOURS AS NEEDED FOR WHEEZING / SHORTNESS OF BREATH         Last refill: 24    Specific Pharmacy location:  [id number] [address]   Date of last appointment:  3-8-25  Date of next appointment:  25  Best number to reach patient:  [phone]

## 2025-05-09 NOTE — TELEPHONE ENCOUNTER
Patient was saying ferrous sulfate 325 (65 Fe) makes her sick she takes nausea meds over the counter

## 2025-05-12 ENCOUNTER — OFFICE VISIT (OUTPATIENT)
Dept: PRIMARY CARE | Facility: CLINIC | Age: 75
End: 2025-05-12
Payer: MEDICARE

## 2025-05-12 VITALS
WEIGHT: 110 LBS | DIASTOLIC BLOOD PRESSURE: 60 MMHG | TEMPERATURE: 97.6 F | BODY MASS INDEX: 18.3 KG/M2 | SYSTOLIC BLOOD PRESSURE: 114 MMHG

## 2025-05-12 DIAGNOSIS — D07.1 VULVAR INTRAEPITHELIAL NEOPLASIA (VIN) GRADE 3: ICD-10-CM

## 2025-05-12 DIAGNOSIS — D51.0 PERNICIOUS ANEMIA: ICD-10-CM

## 2025-05-12 DIAGNOSIS — D50.0 IRON DEFICIENCY ANEMIA DUE TO CHRONIC BLOOD LOSS: Primary | ICD-10-CM

## 2025-05-12 DIAGNOSIS — R01.1 SYSTOLIC MURMUR: ICD-10-CM

## 2025-05-12 PROCEDURE — 1160F RVW MEDS BY RX/DR IN RCRD: CPT | Performed by: FAMILY MEDICINE

## 2025-05-12 PROCEDURE — 3074F SYST BP LT 130 MM HG: CPT | Performed by: FAMILY MEDICINE

## 2025-05-12 PROCEDURE — 1159F MED LIST DOCD IN RCRD: CPT | Performed by: FAMILY MEDICINE

## 2025-05-12 PROCEDURE — 99214 OFFICE O/P EST MOD 30 MIN: CPT | Performed by: FAMILY MEDICINE

## 2025-05-12 PROCEDURE — 3078F DIAST BP <80 MM HG: CPT | Performed by: FAMILY MEDICINE

## 2025-05-12 NOTE — PATIENT INSTRUCTIONS
You are pleased that your energy has improved lately and will continue on your B12 and iron pills.  Your blood count is improved.  You had a murmur today but have not been told of that in the past and your echocardiogram showed no significant valve disease three years ago.  We will keep an eye on this at this point.  Return in three months for a recheck as scheduled in August.  Call if refills are needed.

## 2025-05-12 NOTE — PROGRESS NOTES
Subjective   Patient ID: [id number]     Edu Domingo is a 74 y.o. female who presents for Follow-up.  HPI    She has DAMIEN 3 and has had the removal of a perianal lesion.  She was hospitalized for a vulvar abscess at the left vulva/hip.  This required IV antibiotics and incision and drainage.  She saw Nicky Rodriguez CNP, oncology, 5/7/25.  Infection seemed healed, her note reviewed.      She no longer has much pain there.  She states the infection is a lot better.     She has had anemia.  She is feeling a bit stronger.  She is on iron pills and vitamin B12 supplements. She has a lot more energy.    Her Hb has improved from 10-11 up to 13.6.      She does get some constipation and nausea and reflux with the iron pills.  She can take care of the constipation with prunes.     She has never been told of a murmur.  Echocardiogram was done in 2022 showing a normal aortic valve.    Objective     /60   Temp 36.4 °C (97.6 °F) (Skin)   Wt 49.9 kg (110 lb)   LMP  (LMP Unknown)   BMI 18.30 kg/m²      Physical Exam  Constitutional:       Appearance: Normal appearance.   Cardiovascular:      Rate and Rhythm: Normal rate and regular rhythm.      Heart sounds: Murmur (KATINA murmur heeard.) heard.   Pulmonary:      Effort: Pulmonary effort is normal. No respiratory distress.      Breath sounds: Normal breath sounds.   Abdominal:      General: Abdomen is flat.      Palpations: Abdomen is soft.      Tenderness: There is no abdominal tenderness. There is no guarding or rebound.   Neurological:      General: No focal deficit present.      Mental Status: She is alert and oriented to person, place, and time.         Assessment/Plan   Problem List Items Addressed This Visit       Vulvar intraepithelial neoplasia (DAMIEN) grade 3     Other Visit Diagnoses         Iron deficiency anemia due to chronic blood loss    -  Primary      Pernicious anemia          Systolic murmur            You are pleased that your energy has improved lately and  will continue on your B12 and iron pills.  Your blood count is improved.  You had a murmur today but have not been told of that in the past and your echocardiogram showed no significant valve disease three years ago.  We will keep an eye on this at this point.  Return in three months for a recheck as scheduled in August.  Call if refills are needed.       Abram Salmeron, DO

## 2025-05-23 ENCOUNTER — TELEPHONE (OUTPATIENT)
Dept: PRIMARY CARE | Facility: CLINIC | Age: 75
End: 2025-05-23
Payer: MEDICARE

## 2025-05-23 DIAGNOSIS — F41.9 ANXIETY: ICD-10-CM

## 2025-05-23 RX ORDER — ALPRAZOLAM 0.5 MG/1
0.5 TABLET ORAL 3 TIMES DAILY PRN
Qty: 30 TABLET | Refills: 0 | Status: SHIPPED | OUTPATIENT
Start: 2025-05-23 | End: 2026-01-18

## 2025-05-23 NOTE — TELEPHONE ENCOUNTER
REFILL  MEDICATION:     Alprazolam 0.5 MG; Take 1 tablet 3 times a day as needed for anxiety.     PHARM: [facility]   PHARM NUMBER: [phone]    LR: 5/9/25      30 tablets with 0 refills   LV: 5/12/25  NV: 8/8/25

## 2025-06-03 ENCOUNTER — APPOINTMENT (OUTPATIENT)
Dept: NEUROLOGY | Facility: CLINIC | Age: 75
End: 2025-06-03
Payer: MEDICARE

## 2025-06-17 ENCOUNTER — TELEPHONE (OUTPATIENT)
Dept: PRIMARY CARE | Facility: CLINIC | Age: 75
End: 2025-06-17
Payer: MEDICARE

## 2025-06-17 DIAGNOSIS — M19.91 PRIMARY OSTEOARTHRITIS, UNSPECIFIED SITE: ICD-10-CM

## 2025-06-17 NOTE — TELEPHONE ENCOUNTER
REFILL  MEDICATION:     Celecoxib 200 MG; Take 1 capsule daily as needed for moderate pain.     PHARM: [facility]   PHARM NUMBER: [phone]    LR: 12/16/24       90 capsules with 1 refill   LV: 5/12/25  NV: 8/8/25

## 2025-06-18 ENCOUNTER — APPOINTMENT (OUTPATIENT)
Dept: GYNECOLOGIC ONCOLOGY | Facility: CLINIC | Age: 75
End: 2025-06-18
Payer: MEDICARE

## 2025-06-19 ENCOUNTER — APPOINTMENT (OUTPATIENT)
Dept: GYNECOLOGIC ONCOLOGY | Facility: CLINIC | Age: 75
End: 2025-06-19
Payer: MEDICARE

## 2025-06-19 RX ORDER — CELECOXIB 200 MG/1
200 CAPSULE ORAL DAILY PRN
Qty: 90 CAPSULE | Refills: 1 | Status: SHIPPED | OUTPATIENT
Start: 2025-06-19

## 2025-06-23 ENCOUNTER — TELEPHONE (OUTPATIENT)
Dept: PRIMARY CARE | Facility: CLINIC | Age: 75
End: 2025-06-23
Payer: MEDICARE

## 2025-06-23 NOTE — TELEPHONE ENCOUNTER
Patient LM on VM stating that her O2 supplier requires an office note from you stating that she is using Oxygen and at what level and that she is benefiting from the Oxygen use.  Patient may be reached at [phone] with any questions.

## 2025-06-26 ENCOUNTER — TELEPHONE (OUTPATIENT)
Dept: PRIMARY CARE | Facility: CLINIC | Age: 75
End: 2025-06-26
Payer: MEDICARE

## 2025-06-26 DIAGNOSIS — F41.9 ANXIETY: ICD-10-CM

## 2025-06-26 RX ORDER — ALPRAZOLAM 0.5 MG/1
0.5 TABLET ORAL 3 TIMES DAILY PRN
Qty: 30 TABLET | Refills: 0 | Status: SHIPPED | OUTPATIENT
Start: 2025-06-26 | End: 2026-02-21

## 2025-06-26 NOTE — TELEPHONE ENCOUNTER
REFILL  MEDICATION:     Alprazolam 0.5 MG; Take 1 tablet 3 times a day as needed for anxiety.     PHARM: [facility]   PHARM NUMBER: [phone]    LR: 5/23/25      30 tablets with 0 refills   LV: 5/12/25  NV: 8/8/25

## 2025-07-10 ENCOUNTER — TELEPHONE (OUTPATIENT)
Dept: PRIMARY CARE | Facility: CLINIC | Age: 75
End: 2025-07-10
Payer: MEDICARE

## 2025-07-10 NOTE — TELEPHONE ENCOUNTER
Patient calling stating [facility] needs a note faxed in RE: to [employer] showing that you have gone over usage of Oxygen and it is still needed.  Fax [phone]  Attn:  Linda

## 2025-07-24 ENCOUNTER — APPOINTMENT (OUTPATIENT)
Dept: CARDIOLOGY | Facility: CLINIC | Age: 75
End: 2025-07-24
Payer: MEDICARE

## 2025-07-24 VITALS
HEART RATE: 70 BPM | WEIGHT: 111 LBS | HEIGHT: 65 IN | SYSTOLIC BLOOD PRESSURE: 138 MMHG | DIASTOLIC BLOOD PRESSURE: 62 MMHG | BODY MASS INDEX: 18.49 KG/M2 | OXYGEN SATURATION: 96 %

## 2025-07-24 DIAGNOSIS — E78.2 MIXED HYPERLIPIDEMIA: ICD-10-CM

## 2025-07-24 DIAGNOSIS — I25.5 CARDIOMYOPATHY, ISCHEMIC: ICD-10-CM

## 2025-07-24 DIAGNOSIS — F32.0 DEPRESSION, MAJOR, SINGLE EPISODE, MILD: ICD-10-CM

## 2025-07-24 DIAGNOSIS — I47.29 NON-SUSTAINED VENTRICULAR TACHYCARDIA (MULTI): ICD-10-CM

## 2025-07-24 DIAGNOSIS — N18.31 STAGE 3A CHRONIC KIDNEY DISEASE (MULTI): ICD-10-CM

## 2025-07-24 DIAGNOSIS — F17.200 NICOTINE USE DISORDER: ICD-10-CM

## 2025-07-24 DIAGNOSIS — Z98.61 CAD S/P PERCUTANEOUS CORONARY ANGIOPLASTY: ICD-10-CM

## 2025-07-24 DIAGNOSIS — R94.31 ABNORMAL EKG: ICD-10-CM

## 2025-07-24 DIAGNOSIS — R60.0 PERIPHERAL EDEMA: ICD-10-CM

## 2025-07-24 DIAGNOSIS — C34.90 ADENOCARCINOMA OF LUNG, UNSPECIFIED LATERALITY (MULTI): ICD-10-CM

## 2025-07-24 DIAGNOSIS — I10 PRIMARY HYPERTENSION: ICD-10-CM

## 2025-07-24 DIAGNOSIS — R09.89 BILATERAL CAROTID BRUITS: ICD-10-CM

## 2025-07-24 DIAGNOSIS — I73.00 RAYNAUD'S DISEASE WITHOUT GANGRENE: Primary | ICD-10-CM

## 2025-07-24 DIAGNOSIS — I10 ESSENTIAL HYPERTENSION: ICD-10-CM

## 2025-07-24 DIAGNOSIS — K21.9 GASTROESOPHAGEAL REFLUX DISEASE WITHOUT ESOPHAGITIS: ICD-10-CM

## 2025-07-24 DIAGNOSIS — I25.10 CAD S/P PERCUTANEOUS CORONARY ANGIOPLASTY: ICD-10-CM

## 2025-07-24 PROCEDURE — 1159F MED LIST DOCD IN RCRD: CPT | Performed by: INTERNAL MEDICINE

## 2025-07-24 PROCEDURE — 3078F DIAST BP <80 MM HG: CPT | Performed by: INTERNAL MEDICINE

## 2025-07-24 PROCEDURE — 3075F SYST BP GE 130 - 139MM HG: CPT | Performed by: INTERNAL MEDICINE

## 2025-07-24 PROCEDURE — 99214 OFFICE O/P EST MOD 30 MIN: CPT | Performed by: INTERNAL MEDICINE

## 2025-07-24 PROCEDURE — 93000 ELECTROCARDIOGRAM COMPLETE: CPT | Performed by: INTERNAL MEDICINE

## 2025-07-24 PROCEDURE — 3008F BODY MASS INDEX DOCD: CPT | Performed by: INTERNAL MEDICINE

## 2025-07-24 RX ORDER — NITROGLYCERIN 0.4 MG/1
0.4 TABLET SUBLINGUAL EVERY 5 MIN PRN
Qty: 90 TABLET | Refills: 0 | Status: SHIPPED | OUTPATIENT
Start: 2025-07-24

## 2025-07-24 RX ORDER — ASPIRIN 81 MG/1
81 TABLET ORAL DAILY
Qty: 90 TABLET | Refills: 3 | Status: SHIPPED | OUTPATIENT
Start: 2025-07-24 | End: 2026-07-24

## 2025-07-24 RX ORDER — CALCIUM CARBONATE 300MG(750)
1 TABLET,CHEWABLE ORAL DAILY
Qty: 90 TABLET | Refills: 1 | Status: SHIPPED | OUTPATIENT
Start: 2025-07-24

## 2025-07-24 RX ORDER — ATORVASTATIN CALCIUM 80 MG/1
80 TABLET, FILM COATED ORAL DAILY
Qty: 90 TABLET | Refills: 1 | Status: SHIPPED | OUTPATIENT
Start: 2025-07-24

## 2025-07-24 RX ORDER — CARVEDILOL 25 MG/1
25 TABLET ORAL
Qty: 180 TABLET | Refills: 1 | Status: SHIPPED | OUTPATIENT
Start: 2025-07-24

## 2025-07-24 RX ORDER — LISINOPRIL 20 MG/1
20 TABLET ORAL DAILY
Qty: 30 TABLET | Refills: 1 | Status: SHIPPED | OUTPATIENT
Start: 2025-07-24

## 2025-07-24 RX ORDER — FUROSEMIDE 20 MG/1
20 TABLET ORAL DAILY
Qty: 30 TABLET | Refills: 0 | Status: SHIPPED | OUTPATIENT
Start: 2025-07-24 | End: 2026-07-24

## 2025-07-24 RX ORDER — FERROUS SULFATE 325(65) MG
325 TABLET, DELAYED RELEASE (ENTERIC COATED) ORAL
COMMUNITY

## 2025-07-24 RX ORDER — AMLODIPINE BESYLATE 5 MG/1
5 TABLET ORAL DAILY
Qty: 30 TABLET | Refills: 0 | Status: SHIPPED | OUTPATIENT
Start: 2025-07-24

## 2025-07-24 NOTE — PATIENT INSTRUCTIONS
Exercise diet weight loss program.    Hydrate    Use My Chart portal for reviewing records, testing and contacting office.     Discontinue tobacco use.

## 2025-07-24 NOTE — PROGRESS NOTES
CARDIOLOGY OFFICE NOTE     Date:   7/24/2025    Patient:    Edu Domingo    YOB: 1950    Primary Physician: Abram Salmeron DO         REASON FOR VISIT / CHIEF COMPLAINT:     6-month cardiology follow-up.    HPI:     Edu Domingo was seen in cardiac evaluation at the [facility] Cardiology office July 24, 2025.      The patients problems are listed as in the impression below.    Electronic medical records reviewed.    The patient returns.  She feels well overall.  She does have finger numbness with changes in colors particular with cold weather sounding like Raynaud's.  We did discuss possible Buerger's.  She still smoking.    She has no other cardiovascular complaints.    Patient denies Chest Pain, SOB, Lightheadedness, Dizziness, TIA or CVA symptoms.  No CHF or Edema.  No Palpitations.  No GI,  or Bleeding Issues. No Recent Fever or Chills.     Cardiovascular and general review of systems is otherwise negative.    A 14-system review is otherwise negative, other than noted.     PHYSICAL EXAMINATION:      Vitals:    07/24/25 1607   BP: 138/62   Pulse: 70   SpO2: 96%     General: No acute distress.  Alert and oriented.  Head And Neck Examination: No jugular venous distention, no carotid bruits, no mass. Carotid upstrokes preserved. Oral mucosa moist.  No xanthelasma. Head and neck examination otherwise unremarkable.  Lungs: Clear to auscultation and percussion. No wheezes, no rales,  and no rhonchi.  Chest: Excursion appeared to be normal. No chest wall tenderness on palpation.  Heart: Normal S1 and S2. No S3. No S4. No rub. Grade 1/6 systolic murmur, best heard at the left sternal border. Point of maximal impulse was within normal limits.  Abdomen: Soft. Nontender. No organomegaly. No bruits. No masses.   Extremities: No bipedal edema. No clubbing. No cyanosis.  Pulses are strong throughout. No bruits.  Musculoskeletal Exam: No ulcers, otherwise unremarkable.  Neuro: Neurologically appeared  grossly intact.     IMPRESSION:       Cardiovascular status stable  Asymptomatic, CV wise  Bilateral hand discolorations and discomfort, question Raynaud's.  Coronary artery disease history of RCA PCI, moderate known left circumflex disease, medical treatment  Ischemic cardiomyopathy, resolved  LVEF 55% by echocardiogram 11/2022 with inferior lateral hypokinesia  Nonsustained ventricular tachycardia  COPD  History of prior TIA  Prior lobectomy 2014  Depression  Degenerative joint disease  Anxiety  Hypertension  Hyperlipidemia  Nephrolithiasis, post lithotripsy and resection surgery, 2024  Ongoing tobacco abuse  Family history of coronary artery disease  Otherwise as per assessment below.    RECOMMENDATIONS:      Patient is doing well overall.  She did have episode of sepsis back in March 2025.  She feels better since that.  She does have bilateral arm discomfort and color changes mostly in her hands which is concerning for possible Raynaud's.  But she may have Buerger's.  Would suggest discontinue smoking.  She is agreeable.    She does have carotid bruits bilateral on exam.  Would suggest carotid ultrasound prior to her next visit.    She will continue her current medications.  Refills were provided.    Exercise dietary program.    Hydration.    [employer] portal use was encouraged.    We will plan to see back in 3 months with Laboratory Studies and ECG as ordered.     Patient will follow up with their primary physician for general care.    The patient knows to contact medical care earlier if need be.      ALLERGIES:     Morphine, Myrbetriq [mirabegron], Oxycodone-acetaminophen, and Sulfa (sulfonamide antibiotics)     MEDICATIONS:     Current Outpatient Medications   Medication Instructions    albuterol 90 mcg/actuation inhaler INHALE 2 PUFFS BY MOUTH AS INSTRUCTED EVERY 4 HOURS AS NEEDED FOR WHEEZING / SHORTNESS OF BREATH    ALPRAZolam (XANAX) 0.5 mg, oral, 3 times daily PRN    amLODIPine (NORVASC) 5 mg, oral, Daily     aspirin 81 mg, oral, Daily    atorvastatin (LIPITOR) 80 mg, oral, Daily    carvedilol (COREG) 25 mg, oral, 2 times daily (morning and late afternoon)    celecoxib (CELEBREX) 200 mg, oral, Daily PRN    cetirizine HCl (ZYRTEC ORAL) 1 capsule, Daily PRN    cholecalciferol (VITAMIN D-3) 2,000 Units, Daily    ferrous sulfate 325 mg, 3 times daily (morning, midday, late afternoon)    furosemide (LASIX) 20 mg, oral, Daily    lisinopril 20 mg, oral, Daily    magnesium oxide (MAG-OX) 400 mg, oral, Daily    nitroglycerin (NITROSTAT) 0.4 mg, sublingual, Every 5 min PRN, Times three PRN    oxyBUTYnin XL (DITROPAN-XL) 10 mg, oral, Daily, Do not crush, chew, or split.    oxygen (O2) gas therapy at bedtime    venlafaxine XR (EFFEXOR-XR) 37.5 mg, oral, Daily, Do not crush or chew.       ELECTROCARDIOGRAM:      Sinus rhythm, poorly anterior progression rate 64.    CARDIAC TESTING:      None this visit    LABORATORY DATA:      CBC:   Lab Results   Component Value Date    WBC 7.3 04/07/2025    RBC 4.57 04/07/2025    HGB 13.6 04/07/2025    HCT 42.6 04/07/2025     04/07/2025        CMP:    Lab Results   Component Value Date     (L) 03/26/2025    K 4.5 03/26/2025    CL 96 (L) 03/26/2025    CO2 31 03/26/2025    BUN 26 (H) 03/26/2025    CREATININE 0.88 03/26/2025    GLUCOSE 81 03/26/2025    CALCIUM 9.3 03/26/2025       Magnesium:    Lab Results   Component Value Date    MG 1.94 03/17/2025       Lipid Profile:    Lab Results   Component Value Date    CHOL 115 01/14/2025    TRIG 76 01/14/2025    HDL 57.7 01/14/2025    LDLCALC 42 01/14/2025       Hepatic Function Panel:    Lab Results   Component Value Date    ALKPHOS 117 03/13/2025    ALT 14 03/13/2025    AST 19 03/13/2025    PROT 5.7 (L) 03/13/2025    BILITOT 0.8 03/13/2025    BILIDIR 0.1 01/14/2025       TSH:    Lab Results   Component Value Date    TSH 1.58 03/26/2025       HgBA1c:    Lab Results   Component Value Date    HGBA1C 5.9 (H) 08/30/2024       DIAGNOSTIC.:     CT  abdomen pelvis w IV contrast  Result Date: 4/12/2025    LOWER CHEST: Trace pericardial effusion and tiny left pleural effusion are both unchanged. No acute airspace disease     BONES: No acute skeletal findings.     LIVER: Normal. No enlargement or evidence of cirrhosis or fatty change. No mass or other suspect lesion.     SPLEEN: Normal. No enlargement, mass or evidence of splenic vein thrombosis.     PANCREAS: Normal. No CT evidence of acute or chronic pancreatitis. No duct dilation. No mass.   GALLBLADDER: CT cannot exclude stones, but being collapsed, there is no acute cholecystitis     BILE DUCTS: Minimal intrahepatic prominence is probably this patient's normal physiology, unchanged going back across multiple comparison exams and no dilation of the extrahepatic duct     ADRENAL GLANDS: Normal. No nodule or mass.     KIDNEYS AND URETERS: Normal.  No hydronephrosis on either side.  No mass.  Symmetric enhancement.  No infarct or CT evidence of acute pyelonephritis.  No substantial radiodense stone.  Tiny stones and radiolucent stones could be occult on CT.     LYMPH NODES: No adenopathy, intraperitoneal, retroperitoneal, pelvic or otherwise     APPENDIX: Normal.  Not dilated, thick walled or in any other way inflamed in appearance.  No inflammatory change about the appendix.     COLON: Insert loaded with solid stool, none dilated or inflamed     SMALL BOWEL: Normal. No small bowel dilation or any other sign of small bowel obstruction. No sign of active inflammatory bowel disease.     STOMACH / DUODENUM: Grossly normal by CT which has limited sensitivity and specificity for the stomach and duodenum.     RETROPERITONEUM: Normal.  No acute hemorrhage or inflammatory change. Lymph nodes in a separate dedicated section.     OMENTUM, MESENTERY AND PERITONEAL SPACES: Free intraperitoneal air: Negative Free intraperitoneal fluid: Negative Abscess: Negative Other: n/a     URINARY BLADDER: Normal. No wall thickening,  large diverticula, radiodense stone or surrounding inflammatory change.     PELVIS: No obvious acute adnexal abnormality by CT   VASCULATURE: Aortic and iliac atherosclerotic calcifications without aneurysm or other acute finding. No high grade stenosis of the major abdominal aortic branch vessels. Portal venous system patent.     ABDOMINAL WALL: Hernia:                 PROBLEM LIST:     Problem List[1]          Christophe Wood MD, [facility] /  Cardiology      Of Note:  [employer] voice recognition dictation software was utilized partially in the preparation of this note, therefore, inaccuracies in spelling, word choice and punctuation may have occurred which were not recognized at the time of signing.    Patient was seen and examined with total time of visit including chart preparation, rooming, and chart completion exceeding 40 minutes.             [1]   Patient Active Problem List  Diagnosis    Abdominal pain    Abnormal EKG    Adenocarcinoma of lung (Multi)    Antibiotic-associated diarrhea    Anxiety    Atypical chest pain    Back skin lesion    Benign skin cyst    Bilateral carotid bruits    Blepharitis    Bright red rectal bleeding    Bruit of left carotid artery    CAD S/P percutaneous coronary angioplasty    Cellulitis of left axilla    Cellulitis of right axilla    Cellulitis of right ear    Cellulitis, lip    Chest pain    Colitis    Depression, major, single episode, mild    Dizziness    Essential hypertension    Excessive ear wax, bilateral    External hemorrhoids    GERD (gastroesophageal reflux disease)    Hand injury, right, initial encounter    Cephalgia    Headache    Chronic hip pain    Hip pain, left    Hip pain, right    Hives    Hyperlipidemia    Hypertension    Left lumbar radiculitis    Menopausal state    Multiple dysplastic nevi    Near syncope    Nocturia    Nicotine use disorder    Arthritis, degenerative    Osteoarthritis    Overactive bladder    Pain due to onychomycosis of nail     Palpitations    Pneumonia    Post-traumatic osteoarthritis of both ankles    Primary nocturnal enuresis    Radial nerve compression    Rectal bleed    Skin lesion of lower extremity    Squamous cell skin cancer    STEMI (ST elevation myocardial infarction) (Multi)    Carotid artery stenosis    Stenosis of right carotid artery    Stye    Subcutaneous mass    Syncope    Urge incontinence of urine    Urinary incontinence    Urinary urgency    UTI (urinary tract infection)    Vulvar intraepithelial neoplasia (DAMIEN) grade 3    Nephrolithiasis    Sprain of right hand    Skin lesion of face    Acute URI    Right otitis externa    Pharyngitis, acute    Acute sinusitis    Acute otitis media    Acute otitis externa    Acute bronchitis    Cellulitis of face    Non-sustained ventricular tachycardia (Multi)    Actinic keratosis    Other seborrheic keratosis    Chronic cough    Coronary artery disease with unstable angina pectoris (Multi)    Edema of both lower extremities    General weakness    Hematuria    Hypertensive renovascular disease    Injury of radial nerve    Neoplasm of uncertain behavior of skin    Osteoarthritis of carpometacarpal (CMC) joint of thumb    Other melanin hyperpigmentation    Personal history of other malignant neoplasm of skin    Pyelonephritis    Sleep attack    Subclavian steal syndrome    Greater trochanteric pain syndrome    Benign neoplasm of soft tissues of upper limb    Hemangioma of skin and subcutaneous tissue    Abscess of face    Squamous cell carcinoma of skin of lower limb, including hip    BMI 22.0-22.9, adult    Nuclear sclerotic cataract of both eyes    Vulvar dysplasia    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

## 2025-08-04 ENCOUNTER — TELEPHONE (OUTPATIENT)
Dept: PRIMARY CARE | Facility: CLINIC | Age: 75
End: 2025-08-04
Payer: MEDICARE

## 2025-08-04 DIAGNOSIS — F41.9 ANXIETY: ICD-10-CM

## 2025-08-04 NOTE — TELEPHONE ENCOUNTER
REFILL  MEDICATION:     Alprazolam 0.5 MG; Take 1 tablet 3 times a day as needed for anxiety.     PHARM: [facility]   PHARM NUMBER: [phone]    LR: 8/26/25       30 tablets with 0 refills   LV: 6/26/25  NV: 8/8/25

## 2025-08-05 RX ORDER — ALPRAZOLAM 0.5 MG/1
0.5 TABLET ORAL 3 TIMES DAILY PRN
Qty: 30 TABLET | Refills: 0 | Status: SHIPPED | OUTPATIENT
Start: 2025-08-05 | End: 2026-04-02

## 2025-08-08 ENCOUNTER — APPOINTMENT (OUTPATIENT)
Dept: PRIMARY CARE | Facility: CLINIC | Age: 75
End: 2025-08-08
Payer: MEDICARE

## 2025-08-15 ENCOUNTER — TELEPHONE (OUTPATIENT)
Dept: RADIOLOGY | Facility: HOSPITAL | Age: 75
End: 2025-08-15
Payer: MEDICARE

## 2025-08-31 ENCOUNTER — APPOINTMENT (OUTPATIENT)
Dept: RADIOLOGY | Facility: HOSPITAL | Age: 75
End: 2025-08-31
Payer: MEDICARE

## 2025-09-04 ENCOUNTER — APPOINTMENT (OUTPATIENT)
Dept: PRIMARY CARE | Facility: CLINIC | Age: 75
End: 2025-09-04
Payer: MEDICARE

## 2025-09-04 ASSESSMENT — ENCOUNTER SYMPTOMS
COUGH: 0
BACK PAIN: 1
ADENOPATHY: 0
VOMITING: 0
SORE THROAT: 0
RHINORRHEA: 0
OCCASIONAL FEELINGS OF UNSTEADINESS: 0
NUMBNESS: 1
SINUS PRESSURE: 0
LOSS OF SENSATION IN FEET: 0
HEADACHES: 0
DYSPHORIC MOOD: 1
FEVER: 0
CONSTIPATION: 0
FREQUENCY: 0
NAUSEA: 0
DIARRHEA: 0
TROUBLE SWALLOWING: 0
PALPITATIONS: 0
HEMATURIA: 0
WOUND: 0
FATIGUE: 0
DYSURIA: 0
ARTHRALGIAS: 1
ABDOMINAL PAIN: 0
MYALGIAS: 0
BLOOD IN STOOL: 0
DIZZINESS: 0
VOICE CHANGE: 0
WHEEZING: 0
NERVOUS/ANXIOUS: 1
SHORTNESS OF BREATH: 0
DEPRESSION: 0
WEAKNESS: 0

## 2025-09-04 ASSESSMENT — ACTIVITIES OF DAILY LIVING (ADL)
DRESSING: INDEPENDENT
MANAGING_FINANCES: INDEPENDENT
TAKING_MEDICATION: INDEPENDENT
GROCERY_SHOPPING: INDEPENDENT
BATHING: INDEPENDENT
DOING_HOUSEWORK: INDEPENDENT

## 2025-10-30 ENCOUNTER — APPOINTMENT (OUTPATIENT)
Dept: CARDIOLOGY | Facility: CLINIC | Age: 75
End: 2025-10-30
Payer: MEDICARE

## 2025-11-10 ENCOUNTER — APPOINTMENT (OUTPATIENT)
Dept: DERMATOLOGY | Facility: CLINIC | Age: 75
End: 2025-11-10
Payer: MEDICARE

## 2025-12-09 ENCOUNTER — APPOINTMENT (OUTPATIENT)
Dept: PRIMARY CARE | Facility: CLINIC | Age: 75
End: 2025-12-09
Payer: MEDICARE

## 2026-09-17 ENCOUNTER — APPOINTMENT (OUTPATIENT)
Dept: PRIMARY CARE | Facility: CLINIC | Age: 76
End: 2026-09-17
Payer: MEDICARE

## (undated) DEVICE — TRAY, SKIN SCRUB, WET PREP, WITH 4 COMPARMENT

## (undated) DEVICE — CATHETER, URETERAL, POLLACK, OPEN END, 5.5 FR, 70 CM

## (undated) DEVICE — SYRINGE, 20 CC, LUER LOCK

## (undated) DEVICE — GUIDEWIRE, DUAL SENSOR, .035 X 150 STRAIGHT,  3CM

## (undated) DEVICE — DRAINBAG, 15.5 X 31, 2600/2800 UROVIEW, STERILE

## (undated) DEVICE — GOWN, SURGICAL, ROYAL SILK, XXL, STERILE

## (undated) DEVICE — TRAY, MINOR, SINGLE BASIN, STERILE

## (undated) DEVICE — CATHETER, DUAL LUMEN, UDC/10/50 M

## (undated) DEVICE — Device

## (undated) DEVICE — Y-ADAPTER, GATEWAY ADVANTAGE

## (undated) DEVICE — HOLSTER, ELECTROSURGERY ACCESSORY, STERILE

## (undated) DEVICE — TUBING, SUCTION, 6MM X 10, CLEAN N-COND

## (undated) DEVICE — GLOVE, SURGICAL, PROTEXIS PI BLUE W/NEUTHERA, 8.0, PF, LF

## (undated) DEVICE — PROTECTOR, NERVE, ULNAR, PINK

## (undated) DEVICE — SOLUTION, SODIUM CHLORIDE 0.9%, 3000ML, BAG

## (undated) DEVICE — SCOPE, LITHOVUE, STANDARD WITH EMPOWER

## (undated) DEVICE — IRRIGATION KIT, PUMPING, SINGLE ACTION, SYRINGE, 10 CC